# Patient Record
Sex: FEMALE | Race: WHITE | NOT HISPANIC OR LATINO | Employment: UNEMPLOYED | URBAN - METROPOLITAN AREA
[De-identification: names, ages, dates, MRNs, and addresses within clinical notes are randomized per-mention and may not be internally consistent; named-entity substitution may affect disease eponyms.]

---

## 2017-05-01 PROBLEM — Z95.5 S/P CORONARY ARTERY STENT PLACEMENT: Status: ACTIVE | Noted: 2017-05-01

## 2017-05-22 PROBLEM — B00.1 FEVER BLISTER: Status: ACTIVE | Noted: 2017-05-22

## 2018-05-01 PROBLEM — R00.1 BRADYCARDIA: Status: ACTIVE | Noted: 2018-05-01

## 2018-05-01 PROBLEM — Z71.85 VACCINE COUNSELING: Status: ACTIVE | Noted: 2018-05-01

## 2018-05-01 PROBLEM — Z28.21 PNEUMOCOCCAL VACCINE REFUSED: Status: ACTIVE | Noted: 2018-05-01

## 2018-10-05 PROBLEM — Z28.21 INFLUENZA VACCINE REFUSED: Status: ACTIVE | Noted: 2018-10-05

## 2018-10-22 ENCOUNTER — TELEPHONE (OUTPATIENT)
Dept: ADMINISTRATIVE | Facility: HOSPITAL | Age: 53
End: 2018-10-22

## 2018-11-16 ENCOUNTER — LAB VISIT (OUTPATIENT)
Dept: LAB | Facility: HOSPITAL | Age: 53
End: 2018-11-16

## 2018-11-16 DIAGNOSIS — Z12.11 COLON CANCER SCREENING: ICD-10-CM

## 2018-11-16 LAB — HEMOCCULT STL QL IA: NEGATIVE

## 2018-11-16 PROCEDURE — 82274 ASSAY TEST FOR BLOOD FECAL: CPT

## 2018-11-30 ENCOUNTER — TELEPHONE (OUTPATIENT)
Dept: ADMINISTRATIVE | Facility: HOSPITAL | Age: 53
End: 2018-11-30

## 2018-12-28 ENCOUNTER — TELEPHONE (OUTPATIENT)
Dept: ADMINISTRATIVE | Facility: HOSPITAL | Age: 53
End: 2018-12-28

## 2019-06-07 PROBLEM — I10 HYPERTENSION: Status: ACTIVE | Noted: 2019-06-07

## 2019-08-05 PROBLEM — J45.30 MILD PERSISTENT ASTHMA WITHOUT COMPLICATION: Status: ACTIVE | Noted: 2019-08-05

## 2019-08-05 PROBLEM — R53.83 FATIGUE: Status: ACTIVE | Noted: 2019-08-05

## 2019-12-03 ENCOUNTER — TELEPHONE (OUTPATIENT)
Dept: ADMINISTRATIVE | Facility: HOSPITAL | Age: 54
End: 2019-12-03

## 2019-12-03 DIAGNOSIS — Z12.11 SCREENING FOR COLON CANCER: Primary | ICD-10-CM

## 2019-12-17 ENCOUNTER — TELEPHONE (OUTPATIENT)
Dept: ADMINISTRATIVE | Facility: HOSPITAL | Age: 54
End: 2019-12-17

## 2019-12-30 ENCOUNTER — LAB VISIT (OUTPATIENT)
Dept: LAB | Facility: HOSPITAL | Age: 54
End: 2019-12-30
Attending: NURSE PRACTITIONER
Payer: MEDICAID

## 2019-12-30 DIAGNOSIS — Z12.11 SCREENING FOR COLON CANCER: ICD-10-CM

## 2019-12-30 PROCEDURE — 82274 ASSAY TEST FOR BLOOD FECAL: CPT

## 2020-01-07 LAB — HEMOCCULT STL QL IA: NEGATIVE

## 2020-03-30 PROBLEM — D64.9 ANEMIA: Status: ACTIVE | Noted: 2020-03-30

## 2020-03-30 PROBLEM — I25.118 CORONARY ARTERY DISEASE OF NATIVE ARTERY OF NATIVE HEART WITH STABLE ANGINA PECTORIS: Status: ACTIVE | Noted: 2020-03-30

## 2020-08-12 ENCOUNTER — OFFICE VISIT (OUTPATIENT)
Dept: FAMILY MEDICINE | Facility: CLINIC | Age: 55
End: 2020-08-12
Payer: MEDICAID

## 2020-08-12 VITALS
OXYGEN SATURATION: 97 % | HEART RATE: 74 BPM | BODY MASS INDEX: 39.62 KG/M2 | HEIGHT: 63 IN | SYSTOLIC BLOOD PRESSURE: 138 MMHG | DIASTOLIC BLOOD PRESSURE: 80 MMHG | TEMPERATURE: 99 F | WEIGHT: 223.63 LBS

## 2020-08-12 DIAGNOSIS — B00.1 FEVER BLISTER: ICD-10-CM

## 2020-08-12 DIAGNOSIS — J45.20 INTERMITTENT ASTHMA WITHOUT COMPLICATION, UNSPECIFIED ASTHMA SEVERITY: ICD-10-CM

## 2020-08-12 DIAGNOSIS — S93.504S: ICD-10-CM

## 2020-08-12 DIAGNOSIS — M65.311 TRIGGER FINGER OF RIGHT THUMB: Primary | ICD-10-CM

## 2020-08-12 PROCEDURE — G0463 HOSPITAL OUTPT CLINIC VISIT: HCPCS

## 2020-08-12 PROCEDURE — 99203 PR OFFICE/OUTPT VISIT, NEW, LEVL III, 30-44 MIN: ICD-10-PCS | Mod: S$PBB,,, | Performed by: NURSE PRACTITIONER

## 2020-08-12 PROCEDURE — 99205 OFFICE O/P NEW HI 60 MIN: CPT | Performed by: NURSE PRACTITIONER

## 2020-08-12 PROCEDURE — 99203 OFFICE O/P NEW LOW 30 MIN: CPT | Mod: S$PBB,,, | Performed by: NURSE PRACTITIONER

## 2020-08-12 RX ORDER — HYDROCODONE BITARTRATE AND ACETAMINOPHEN 7.5; 325 MG/1; MG/1
1 TABLET ORAL
COMMUNITY
Start: 2020-08-10 | End: 2020-08-17

## 2020-08-12 RX ORDER — VALACYCLOVIR HYDROCHLORIDE 500 MG/1
500 TABLET, FILM COATED ORAL 2 TIMES DAILY
Qty: 14 TABLET | Refills: 1 | Status: SHIPPED | OUTPATIENT
Start: 2020-08-12 | End: 2021-10-04 | Stop reason: SDUPTHER

## 2020-08-12 RX ORDER — IBUPROFEN 600 MG/1
600 TABLET ORAL
COMMUNITY
Start: 2020-08-10 | End: 2020-08-20

## 2020-08-12 RX ORDER — LEVALBUTEROL TARTRATE 45 UG/1
1-2 AEROSOL, METERED ORAL EVERY 4 HOURS PRN
Qty: 1 INHALER | Refills: 5 | Status: SHIPPED | OUTPATIENT
Start: 2020-08-12 | End: 2021-09-14 | Stop reason: SDUPTHER

## 2020-08-12 NOTE — PATIENT INSTRUCTIONS
Toe Sprain  A sprain is a stretching or tearing of the ligaments that hold a joint together. There are no broken bones. Sprains generally take from 3-6 weeks to heal. A toe sprain may be treated by taping the injured toe to the next toe. This is called buddy taping. This protects the injured toe and holds it in position. Mild sprains may not need any additional support. If the toenail has been hurt badly, it may fall off in 1-2 weeks. A new one will usually start to grow back within a month.     Home care  · Keep your leg elevated when sitting or lying down. This is very important during the first 48 hours to reduce swelling. Stay off the injured foot as much as possible until you can walk on it without pain. If needed, you may use crutches during the first week for this purpose. Crutches can be rented at many pharmacies or surgical/orthopedic supply stores.  · You may be given a cast shoe to wear to prevent movement in your toe. If not, you can use a sandal or any shoe that does not put pressure on the injured toe until the swelling and pain go away. If using a sandal, be careful not to hit your foot against anything, since another injury could make the sprain worse.  · Apply an ice pack over the injured area for 15 to 20 minutes every 3 to 6 hours. You should do this for the first 24 to 48 hours. You can make an ice pack by filling a plastic bag that seals at the top with ice cubes and then wrapping it with a thin towel. Continue to use ice packs for relief of pain and swelling as needed. As the ice melts, be careful to avoid getting your wrap, splint, or cast wet. As the ice melts, be careful to avoid getting any wrap, splint, tape, or cast wet. After 48 hours, apply heat from a warm shower or bath for 20 minutes several times daily. Alternating ice and heat may also be helpful.  · If buddy tape was applied and it becomes wet or dirty, change it. You may replace it with paper, plastic or cloth tape. Cloth tape  and paper tapes must be kept dry. Apply gauze or cotton padding between the toes, especially near webbed area. This will help prevent the skin from getting moist and breaking down. Keep the whitney tape in place for at least 4 weeks, or as advised by your healthcare provider.  · You may use over-the-counter pain medicine to control pain, unless another pain medicine was prescribed. If you have chronic liver or kidney disease or ever had a stomach ulcer or GI bleeding, talk with your healthcare provider before using these medicines.  · You may return to sports after healing, when you can run without pain.  Follow-up care  Follow up with your with your healthcare provider as advised. Sometimes fractures dont show up on the first X-ray. Bruises and sprains can sometimes hurt as much as a fracture. These injuries can take time to heal completely. If your symptoms dont improve or they get worse, talk with your healthcare provider. You may need a repeat X-ray. If X-rays were taken, you will be told of any new findings that may affect your care.  When to seek medical advice  Call your healthcare provider right away if any of these occur:  · Redness, warmth, or fluid drainage from your toe  · Pain or swelling increases  · Toes become cold, blue, numb, or tingly  Date Last Reviewed: 11/20/2015  © 9814-7009 [employer]. [address] [place] [address]. All rights reserved. This information is not intended as a substitute for professional medical care. Always follow your healthcare professional's instructions.        Treating Trigger Finger     The tendon sheath is opened to release the tendon. Once the tendon can move freely again, the finger can bend and straighten more normally.     Trigger finger occurs when the tissue inside your finger or thumb becomes inflamed. Mild cases can be treated without surgery. If the problem is severe, surgery may be needed. Your doctor will discuss your options with  you.  Nonsurgical treatment  For mild symptoms, your doctor may have you rest the finger or thumb. You may also be told to take anti-inflammatory medicines. These include ibuprofen or aspirin. You may be given an injection of medicine in the base of the finger or thumb. This typically is a steroid, such as cortisone.  Surgery  If nonsurgical treatments dont ease your symptoms, surgery may be recommended. A tendon is a cordlike fiber that attaches muscle to bone and allows joints to bend. The tendon is surrounded by a protective cover called a sheath. During surgery, the sheath in your finger or thumb is opened to enlarge the space and release the swollen tendon. This allows the finger or thumb to bend and straighten normally. Surgery takes about 20 minutes. It can often be done using a local anesthetic. You may be able to go home the same day. Your hand will be wrapped in a soft bandage. You may need to wear a plaster splint for a short time to keep the finger or thumb still as it heals. The stitches will be removed in about 2 weeks. Your doctor can discuss the risks and benefits of surgery with you.  Date Last Reviewed: 9/21/2015  © 0116-4881 [employer]. [address] [place] [address], [place] [address]. All rights reserved. This information is not intended as a substitute for professional medical care. Always follow your healthcare professional's instructions.

## 2020-08-12 NOTE — PROGRESS NOTES
SUBJECTIVE:      Patient ID: Zoila Moreland is a 54 y.o. female.    Chief Complaint: Toe Pain (2nd  toe on Rt foot. Injured toe 7/3/20 she was moving a table and the leg of the table slid across her toe. She experienced severe bruising and pain but it was healed. On Monday she was walking down the stairs at her Daughter's house and experienced severe pain in the same toe walking down the stairs. Pt was seen at [facility] in [place]. ) and Hand Pain (Trigger finger Rt hand.)    Zoila is here today with c/o toe and hand pain.  She is a new patient to our clinic. She is currently staying in [place] with her daughter and has acute complaints.   Her normal PCP is in [place]. She also needs refills for her Valtrex for cold sores and her rescue inhaler for asthma.    Toe Pain   The incident occurred more than 1 week ago. The incident occurred at home. The injury mechanism was a direct blow. The pain is present in the right toes. The pain is severe. The pain has been constant since onset. Pertinent negatives include no inability to bear weight, loss of motion, loss of sensation, muscle weakness, numbness or tingling. She reports no foreign bodies present. The symptoms are aggravated by movement, palpation and weight bearing. She has tried nothing for the symptoms.   Hand Pain   There was no injury mechanism. The pain is present in the right hand. The quality of the pain is described as aching and shooting. The pain does not radiate. Pertinent negatives include no chest pain, muscle weakness, numbness or tingling.       Past Surgical History:   Procedure Laterality Date    BILATERAL SALPINGOOPHORECTOMY Bilateral 5/18/2007    Torsion of ovaries and tubes    BREAST BIOPSY Right     (-)    CARDIAC CATHETERIZATION      cardiac stents  01/2017    HYSTERECTOMY  1/13/2006    uterus and cervix due to AUB    OOPHORECTOMY       Family History   Problem Relation Age of Onset    Heart disease Mother     Diabetes  Mother     Heart disease Father       Social History     Socioeconomic History    Marital status:      Spouse name: Danny    Number of children: 2    Years of education: 12th    Highest education level: Not on file   Occupational History    Occupation: homemaker   Social Needs    Financial resource strain: Not on file    Food insecurity     Worry: Not on file     Inability: Not on file    Transportation needs     Medical: Not on file     Non-medical: Not on file   Tobacco Use    Smoking status: Never Smoker    Smokeless tobacco: Never Used   Substance and Sexual Activity    Alcohol use: No    Drug use: No    Sexual activity: Yes     Partners: Male     Birth control/protection: Surgical   Lifestyle    Physical activity     Days per week: Not on file     Minutes per session: Not on file    Stress: Not on file   Relationships    Social connections     Talks on phone: Not on file     Gets together: Not on file     Attends Hindu service: Not on file     Active member of club or organization: Not on file     Attends meetings of clubs or organizations: Not on file     Relationship status: Not on file   Other Topics Concern    Are you pregnant or think you may be? No    Breast-feeding No   Social History Narrative    Not on file     Current Outpatient Medications   Medication Sig Dispense Refill    ascorbic acid (VITAMIN C) 1000 MG tablet Take 1,000 mg by mouth once daily.      aspirin 81 MG Chew Take 1 tablet (81 mg total) by mouth once daily. 1 tablet 0    atenoloL (TENORMIN) 50 MG tablet Take 1 tablet (50 mg total) by mouth once daily. 90 tablet 3    atorvastatin (LIPITOR) 80 MG tablet Take 1 tablet (80 mg total) by mouth every evening. 90 tablet 3    calcium-vitamin D (OSCAL) 250 (625)-125 mg-unit per tablet Take 1 tablet by mouth once daily.      ibuprofen (ADVIL,MOTRIN) 600 MG tablet Take 600 mg by mouth.      levalbuterol (XOPENEX HFA) 45 mcg/actuation inhaler Inhale 1-2 puffs  into the lungs every 4 (four) hours as needed for Wheezing. Rescue 1 Inhaler 5    lisinopriL (PRINIVIL,ZESTRIL) 40 MG tablet Take 1 tablet (40 mg total) by mouth every evening. 90 tablet 3    pantoprazole (PROTONIX) 40 MG tablet Take 1 tablet (40 mg total) by mouth once daily. 90 tablet 3    solifenacin (VESICARE) 10 MG tablet Take 1 tablet by mouth once daily 90 tablet 0    valACYclovir (VALTREX) 500 MG tablet Take 1 tablet (500 mg total) by mouth 2 (two) times daily. As needed for flare-up 14 tablet 1    HYDROcodone-acetaminophen (NORCO) 7.5-325 mg per tablet Take 1 tablet by mouth.      LORazepam (ATIVAN) 0.5 MG tablet Take one tablet approximately 30 min prior to MRI. Do not drive when taking lorazepam. (Patient not taking: Reported on 8/12/2020) 1 tablet 0     No current facility-administered medications for this visit.      Review of patient's allergies indicates:  No Known Allergies   Past Medical History:   Diagnosis Date    Allergy     Anemia 3/30/2020    Asthma     Fever blister     Hypertension      Past Surgical History:   Procedure Laterality Date    BILATERAL SALPINGOOPHORECTOMY Bilateral 5/18/2007    Torsion of ovaries and tubes    BREAST BIOPSY Right     (-)    CARDIAC CATHETERIZATION      cardiac stents  01/2017    HYSTERECTOMY  1/13/2006    uterus and cervix due to AUB    OOPHORECTOMY         Review of Systems   Constitutional: Negative for activity change, appetite change, chills, diaphoresis, fatigue, fever and unexpected weight change.   HENT: Negative for congestion, nosebleeds, rhinorrhea, sore throat and voice change.    Eyes: Negative for pain, discharge and visual disturbance.   Respiratory: Negative for apnea, cough, shortness of breath and wheezing.    Cardiovascular: Negative for chest pain and palpitations.   Gastrointestinal: Negative for abdominal pain, constipation, diarrhea, nausea and vomiting.   Endocrine: Negative for polydipsia, polyphagia and polyuria.  "  Genitourinary: Negative for difficulty urinating, dysuria, frequency, menstrual problem, urgency and vaginal discharge.   Musculoskeletal: Negative for arthralgias, gait problem and myalgias.   Skin: Negative for color change, pallor and rash.   Allergic/Immunologic: Negative for immunocompromised state.   Neurological: Negative for dizziness, tingling, syncope, weakness, numbness and headaches.   Hematological: Negative for adenopathy. Does not bruise/bleed easily.   Psychiatric/Behavioral: Negative for confusion, dysphoric mood, self-injury, sleep disturbance and suicidal ideas.      OBJECTIVE:      Vitals:    08/12/20 1454 08/12/20 1539   BP: (!) 170/100 138/80   BP Location: Right arm Left arm   Patient Position: Sitting Sitting   BP Method: Large (Manual) Large (Manual)   Pulse: 74    Temp: 98.8 °F (37.1 °C)    SpO2: 97%    Weight: 101.4 kg (223 lb 9.6 oz)    Height: 5' 3" (1.6 m)      Physical Exam  Vitals signs reviewed.   Constitutional:       General: She is not in acute distress.     Appearance: Normal appearance. She is well-developed. She is obese. She is not ill-appearing, toxic-appearing or diaphoretic.   HENT:      Head: Normocephalic and atraumatic.      Right Ear: External ear normal.      Left Ear: External ear normal.      Nose: Nose normal.   Eyes:      General: Lids are normal. No scleral icterus.        Right eye: No discharge.         Left eye: No discharge.      Conjunctiva/sclera: Conjunctivae normal.   Neck:      Musculoskeletal: Normal range of motion and neck supple.      Thyroid: No thyromegaly.      Vascular: No carotid bruit.   Cardiovascular:      Rate and Rhythm: Normal rate and regular rhythm.      Pulses: Normal pulses.      Heart sounds: Normal heart sounds. No murmur. No friction rub. No gallop.    Pulmonary:      Effort: Pulmonary effort is normal. No respiratory distress.      Breath sounds: Normal breath sounds. No stridor. No wheezing, rhonchi or rales.   Abdominal:      " General: Bowel sounds are normal.      Palpations: Abdomen is soft.      Tenderness: There is no abdominal tenderness.   Musculoskeletal:      Right hand: She exhibits decreased range of motion (thumb) and tenderness. She exhibits normal capillary refill.        Hands:       Right foot: Bony tenderness (2nd toe) and swelling present.   Lymphadenopathy:      Cervical: No cervical adenopathy.   Skin:     General: Skin is warm and dry.      Coloration: Skin is not jaundiced.   Neurological:      General: No focal deficit present.      Mental Status: She is alert and oriented to person, place, and time.   Psychiatric:         Mood and Affect: Mood normal.         Behavior: Behavior normal.         Thought Content: Thought content normal. Thought content does not include suicidal plan.         Judgment: Judgment normal.        Assessment:       1. Trigger finger of right thumb    2. Sprain of toe, second, right, sequela    3. Fever blister    4. Intermittent asthma without complication, unspecified asthma severity        Plan:       Trigger finger of right thumb  -     Ambulatory referral/consult to Orthopedics; Future; Expected date: 08/19/2020    Sprain of toe, second, right, sequela  -     Ambulatory referral/consult to Podiatry; Future; Expected date: 08/19/2020    Fever blister  -     valACYclovir (VALTREX) 500 MG tablet; Take 1 tablet (500 mg total) by mouth 2 (two) times daily. As needed for flare-up  Dispense: 14 tablet; Refill: 1    Intermittent asthma without complication, unspecified asthma severity  -     levalbuterol (XOPENEX HFA) 45 mcg/actuation inhaler; Inhale 1-2 puffs into the lungs every 4 (four) hours as needed for Wheezing. Rescue  Dispense: 1 Inhaler; Refill: 5        Follow up if symptoms worsen or fail to improve.      8/12/2020 HUBERT Marcus, FNP

## 2020-08-17 ENCOUNTER — TELEPHONE (OUTPATIENT)
Dept: FAMILY MEDICINE | Facility: CLINIC | Age: 55
End: 2020-08-17

## 2020-08-17 NOTE — TELEPHONE ENCOUNTER
----- Message from Zuly Hidalgo sent at 8/13/2020  1:31 PM CDT -----  DAMON PARDO [facility], 8/12/20

## 2020-08-18 ENCOUNTER — HOSPITAL ENCOUNTER (OUTPATIENT)
Dept: RADIOLOGY | Facility: CLINIC | Age: 55
Discharge: HOME | End: 2020-08-18
Attending: PODIATRIST
Payer: MEDICAID

## 2020-08-18 ENCOUNTER — OFFICE VISIT (OUTPATIENT)
Dept: PODIATRY | Facility: CLINIC | Age: 55
End: 2020-08-18
Payer: MEDICAID

## 2020-08-18 VITALS
HEIGHT: 63 IN | BODY MASS INDEX: 39.51 KG/M2 | HEART RATE: 80 BPM | RESPIRATION RATE: 16 BRPM | DIASTOLIC BLOOD PRESSURE: 76 MMHG | SYSTOLIC BLOOD PRESSURE: 130 MMHG | WEIGHT: 223 LBS | TEMPERATURE: 98 F

## 2020-08-18 DIAGNOSIS — M77.50 TENDONITIS OF FOOT: ICD-10-CM

## 2020-08-18 DIAGNOSIS — M79.672 BILATERAL FOOT PAIN: ICD-10-CM

## 2020-08-18 DIAGNOSIS — M79.671 BILATERAL FOOT PAIN: ICD-10-CM

## 2020-08-18 DIAGNOSIS — M72.2 PLANTAR FASCIITIS: ICD-10-CM

## 2020-08-18 DIAGNOSIS — S93.504S: ICD-10-CM

## 2020-08-18 DIAGNOSIS — S92.524D: Primary | ICD-10-CM

## 2020-08-18 PROCEDURE — 73630 XR FOOT COMPLETE 3 VIEW BILATERAL: ICD-10-PCS | Mod: 26,50,S$PBB, | Performed by: PODIATRIST

## 2020-08-18 PROCEDURE — 73630 X-RAY EXAM OF FOOT: CPT | Mod: 50,PBBFAC | Performed by: PODIATRIST

## 2020-08-18 PROCEDURE — 99215 OFFICE O/P EST HI 40 MIN: CPT | Mod: 25 | Performed by: PODIATRIST

## 2020-08-18 PROCEDURE — 99203 OFFICE O/P NEW LOW 30 MIN: CPT | Mod: S$PBB,,, | Performed by: PODIATRIST

## 2020-08-18 PROCEDURE — 99203 PR OFFICE/OUTPT VISIT, NEW, LEVL III, 30-44 MIN: ICD-10-PCS | Mod: S$PBB,,, | Performed by: PODIATRIST

## 2020-08-18 NOTE — LETTER
August 19, 2020      Madiha Corral, FNP  [address]  [place] [address]           [facility] Pod[facility]  [address]  [place] [address]  Phone: [phone]  Fax: [phone]          Patient: Zoila Moreland   MR Number: [id number]   YOB: 1965   Date of Visit: 8/18/2020       Dear Madiha Corral:    Thank you for referring Zoila Moreland to me for evaluation. Attached you will find relevant portions of my assessment and plan of care.    If you have questions, please do not hesitate to call me. I look forward to following Zoila Moreland along with you.    Sincerely,    Kwasi Ball, BEREKET    Enclosure  CC:  No Recipients    If you would like to receive this communication electronically, please contact [email or web address] or [phone] to request more information on [employer] access.    For providers and/or their staff who would like to refer a patient to [facility], please contact us through our one-stop-shop provider referral line, [facility], at [phone].    If you feel you have received this communication in error or would no longer like to receive these types of communications, please e-mail [email or web address]

## 2020-08-18 NOTE — PROGRESS NOTES
[address] [facility] [address] [place] [address]  Phone: [phone]   [phone]    Patient's PCP:YESENIA Marcus  Referring Provider: Madiha Corral    Subjective:      Chief Complaint:: Foot Pain (right seconf toe and left plantar surface)    AMEE Moreland is a 54 y.o. female who presents with a complaint of  Second toe right foot sprain lasting for over a month. Onset of the symptoms was moving coffee table and the leg knocked across top of toe resulting in bruising. Last week patient had sharp pain when walking down daughters steps.  Current symptoms include sharp pain swelling,  And blue bruising.  Aggravating factors are walking, flip-flops. Symptoms have remained.Treatment to date have included er visit, x-rays, boot wore for one day Patients rates pain 4/10 on pain scale. Patient also has complain of left foot pain plantar surface with sharp to throbbing pain for several months.     Systemic Doctor: YESENIA Marcus  Date Last Seen: 08/12/2020    Vitals:    08/18/20 1538   BP: 130/76   Pulse: 80   Resp: 16   Temp: 98.4 °F (36.9 °C)     Shoe Size: 6-6.5    Past Surgical History:   Procedure Laterality Date    BILATERAL SALPINGOOPHORECTOMY Bilateral 5/18/2007    Torsion of ovaries and tubes    BREAST BIOPSY Right     (-)    CARDIAC CATHETERIZATION      cardiac stents  01/2017    HYSTERECTOMY  1/13/2006    uterus and cervix due to AUB    OOPHORECTOMY       Past Medical History:   Diagnosis Date    Allergy     Anemia 3/30/2020    Asthma     Fever blister     Hypertension      Family History   Problem Relation Age of Onset    Heart disease Mother     Diabetes Mother     Heart disease Father         Social History:   Marital Status:   Alcohol History:  reports no history of alcohol use.  Tobacco History:  reports that she has never smoked. She has never used smokeless tobacco.  Drug History:  reports no history of drug use.    Review of patient's allergies  indicates:  No Known Allergies    Current Outpatient Medications   Medication Sig Dispense Refill    ascorbic acid (VITAMIN C) 1000 MG tablet Take 1,000 mg by mouth once daily.      aspirin 81 MG Chew Take 1 tablet (81 mg total) by mouth once daily. 1 tablet 0    atenoloL (TENORMIN) 50 MG tablet Take 1 tablet (50 mg total) by mouth once daily. 90 tablet 3    atorvastatin (LIPITOR) 80 MG tablet Take 1 tablet (80 mg total) by mouth every evening. 90 tablet 3    calcium-vitamin D (OSCAL) 250 (625)-125 mg-unit per tablet Take 1 tablet by mouth once daily.      ibuprofen (ADVIL,MOTRIN) 600 MG tablet Take 600 mg by mouth.      levalbuterol (XOPENEX HFA) 45 mcg/actuation inhaler Inhale 1-2 puffs into the lungs every 4 (four) hours as needed for Wheezing. Rescue 1 Inhaler 5    lisinopriL (PRINIVIL,ZESTRIL) 40 MG tablet Take 1 tablet (40 mg total) by mouth every evening. 90 tablet 3    LORazepam (ATIVAN) 0.5 MG tablet Take one tablet approximately 30 min prior to MRI. Do not drive when taking lorazepam. (Patient not taking: Reported on 8/12/2020) 1 tablet 0    pantoprazole (PROTONIX) 40 MG tablet Take 1 tablet (40 mg total) by mouth once daily. 90 tablet 3    solifenacin (VESICARE) 10 MG tablet Take 1 tablet by mouth once daily 90 tablet 0    valACYclovir (VALTREX) 500 MG tablet Take 1 tablet (500 mg total) by mouth 2 (two) times daily. As needed for flare-up 14 tablet 1     No current facility-administered medications for this visit.        Review of Systems      Objective:        Physical Exam:   Foot Exam    General  General Appearance: appears stated age and healthy   Orientation: alert and oriented to person, place, and time   Affect: appropriate   Gait: antalgic       Right Foot/Ankle     Inspection and Palpation  Ecchymosis: none  Tenderness: (Sec toe)  Swelling: (Sec toe)  Arch: normal  Skin Exam: skin intact;     Neurovascular  Dorsalis pedis: 2+  Posterior tibial: 2+  Saphenous nerve sensation:  normal  Tibial nerve sensation: normal  Superficial peroneal nerve sensation: normal  Deep peroneal nerve sensation: normal  Sural nerve sensation: normal    Muscle Strength  Ankle dorsiflexion: 5  Ankle plantar flexion: 5  Ankle inversion: 5  Ankle eversion: 5  Great toe extension: 5  Great toe flexion: 5    Comments  Tenderness with dorsiflexion of the 2nd toe against resistance.  Muscle strength is normal.  Extensor digitorum tendon is well palpable.    Left Foot/Ankle      Inspection and Palpation  Ecchymosis: none  Tenderness: plantar fascia   Swelling: none   Arch: normal  Skin Exam: skin intact;     Neurovascular  Dorsalis pedis: 2+  Posterior tibial: 2+  Saphenous nerve sensation: normal  Tibial nerve sensation: normal  Superficial peroneal nerve sensation: normal  Deep peroneal nerve sensation: normal  Sural nerve sensation: normal    Muscle Strength  Ankle dorsiflexion: 5  Ankle plantar flexion: 5  Ankle inversion: 5  Ankle eversion: 5  Great toe extension: 5  Great toe flexion: 5    Range of Motion    Passive  Ankle dorsiflexion: 5      Tests  Anterior drawer: negative   Talar tilt: negative     Comments  Pain on palpation of the infeior medial heel. No pain present  with side to side compression of the calcaneus. Negative tinnel's sign  at the tarsal tunnel. Negative Powers's nerve pain. Negative Calcaneal nerve pain. No soft tissue masses. Pain absent  with dorsiflexion of the ankle. No edema, erythema, or ecchymosis noted.       Physical Exam   Cardiovascular:   Pulses:       Dorsalis pedis pulses are 2+ on the right side and 2+ on the left side.        Posterior tibial pulses are 2+ on the right side and 2+ on the left side.       Imaging: X-ray 3 views of the left foot were taken AP, lateral, and oblique, weight bearing showing:  No fractures or dislocations.  No bone tumors or soft tissue masses.  Degenerative changes are noted at the 2nd metatarsophalangeal joint.  Plantar and posterior calcaneal  osteophytes are present.      Electronically Signed by: Kwasi Ball DPM    X-ray 3 views of the right foot were taken AP, lateral, and oblique, weight bearing showing:  There is a small nondisplaced chip fracture at the head of the middle phalanx of the 2nd toe.  This appears to be healing.  No other fractures or dislocations noted.  Plantar and posterior calcaneal osteophytes are present.  No bone tumors or soft tissue masses.      Electronically Signed by: Kwasi Ball DPM               Assessment:       1. Closed nondisplaced fracture of middle phalanx of lesser toe of right foot with routine healing    2. Tendonitis of foot - Right Foot    3. Sprain of toe, second, right, sequela    4. Plantar fasciitis - Left Foot    5. Bilateral foot pain      Plan:   Closed nondisplaced fracture of middle phalanx of lesser toe of right foot with routine healing    Tendonitis of foot - Right Foot    Sprain of toe, second, right, sequela  -     Ambulatory referral/consult to Podiatry    Plantar fasciitis - Left Foot    Bilateral foot pain  -     X-Ray Foot Complete Bilateral      Follow up if symptoms worsen or fail to improve.    Procedures - None    Plantar Fasciitis Level I Treatment:   Anti-inflammatory  No bare feet  Over the counter insert  Restrict activity level   Use running shoes at full time  No impact exercise and stretch gastrocnemius muscle      Counseling:     I provided patient education verbally regarding:   Patient diagnosis, treatment options, as well as alternatives, risks, and benefits.     Treatment of tendonitis with rest, ice, oral NSAID, topical antiinflammatory creams, cam walker boot if needed, and MRI if needed.    Discussed different treatment options for heel pain. I gave written and verbal instructions on heel cord stretching and this was demonstrated for the patient. Patient expressed understanding. Discussed wearing appropriate shoe gear and avoiding flats, slippers, sandals, barefoot, and  sockfeet. Recommended arch supports. My recommendation for OTC supports is Spenco polysorb replacement insoles or patient may elect more aggressive treatment with prescription arch supports. We also discussed cortisone injections and NSAID therapy.     I discussed with the pt. the type of fracture and the treatment and time required for healing of the the specific type fracture.      This note was created using Dragon voice recognition software that occasionally misinterpreted phrases or words.

## 2020-08-18 NOTE — PATIENT INSTRUCTIONS
Plantar Fasciitis  Plantar fasciitis is a painful swelling of the plantar fascia. The plantar fascia is a thick, fibrous layer of tissue. It covers the bones on the bottom of your foot. And it supports the foot bones in an arched position.  This can happen gradually or suddenly. It usually affects one foot at a time. Heel pain can be sharp, like a knife sticking into the bottom of your foot. You may feel pain after exercising, long-distance jogging, stair climbing, long periods of standing, or after standing up.  Risk factors include: non-active lifestyle, arthritis, diabetes, obesity or recent weight gain, flat foot, high arch. Wearing high heels, loose shoes, or shoes with poor arch support for long periods of time adds to the risk. This problem is commonly found in runners and dancers. It also found in people who stand on hard surfaces for long periods of time.  Foot pain from this condition is usually worse in the morning. But it improves with walking. By the end of the day there may be a dull aching. Treatment requires short-term rest and controlling swelling. It may take up to 9 months before all symptoms go away. Rarely, a steroid injection into the foot, or surgery, may be needed.  Home care  · If you are overweight, lose weight to help healing.  · Choose supportive shoes with good arch support and shock absorbency. Replace athletic shoes when they become worn out. Dont walk or run barefoot.  · Premade or custom-fitted shoe inserts may be helpful. Inserts made of silicone seem to be the most effective. Custom-made inserts can be provided by a podiatrist or foot specialist, physical therapist, or orthopedist.  · Premade or custom-made night splints keep the heel stretched out while you sleep. They may prevent morning pain.  · Avoid activities that stress the feet: jogging, prolonged standing or walking, contact sports, etc.  · First thing in the morning and before sports, stretch the bottom of your feet.  Gently flex your ankle so the toes move toward your knee.  · Icing may help control heel pain. Apply an ice pack to the heel for 10-20 minutes as a preventive. Or ice your heel after a severe flare-up of symptoms. You may repeat this every 1-2 hours as needed.  · You may use over-the-counter pain medicine to control pain, unless another medicine was prescribed. Anti-inflammatory pain medicines, such as ibuprofen or naproxen, may work better than acetaminophen. If you have chronic liver or kidney disease or ever had a stomach ulcer or GI bleeding, talk with your healthcare provider before using these medicines.  Follow-up care  Follow up with your healthcare provider, physical therapist, or podiatrist or foot specialist as advised.  Call for an appointment if pain worsens or there is no relief after a few weeks of home treatment. Shoe inserts, a night splint, or a special boot may be required.  If X-rays were taken, you will be told of any new findings that may affect your care.  When to seek medical advice  Call your healthcare provider right away if any of these occur:  · Foot swelling  · Redness with increasing pain  Date Last Reviewed: 11/21/2015  © 3660-2407 [employer]. [address] [place] [address]. All rights reserved. This information is not intended as a substitute for professional medical care. Always follow your healthcare professional's instructions.          Tendonitis  A tendon is the thick fibrous cord that joins muscle to bone and allows joints to move. When a tendon becomes inflamed, it is called tendonitis. This can occur from overuse, injury, or infection. This usually involves the shoulders, forearm, wrist, hands and foot. Symptoms include pain, swelling and tenderness to the touch. Moving the joint increases the pain.  It takes 4 to 6 weeks for tendonitis to heal. It is treated by preventing motion of the tendon with a splint or brace and the use of anti-inflammatory  medicine.  Home care  · Some people find relief with ice packs. These can be crushed or cubed ice in a plastic bag or a bag of frozen vegetables wrapped in a thin towel. Other people get better relief with heat. This can include a hot shower, hot bath, or a moist towel warmed in a microwave. Try each and use the method that feels best, for 15 to 20 minutes several times a day.  · Rest the inflamed joint and protect it from movement.  · You may use over-the-counter ibuprofen or naproxen to treat pain and inflammation, unless another medicine was prescribed. If you can't take these medicines, acetaminophen may help with the pain, but does not treat inflammation. If you have chronic liver or kidney disease or ever had a stomach ulcer or gastrointestinal bleeding, talk with your doctor before using these medicines.  · As your symptoms improve, begin gradual motion at the involved joint.  Follow-up care  Follow up with your healthcare provider if you are not improving after 5 days of treatment.  When to seek medical advice  Call your healthcare provider right away if any of these occur:  · Redness over the painful area  · Increasing pain or swelling at the joint  · Fever (1 degree above your normal temperature) lasting 24 to 48 hours Or, whatever your healthcare provider told you to report based on your condition  Date Last Reviewed: 11/21/2015  © 1920-9505 [employer]. [address] [place] [address], [place] [address]. All rights reserved. This information is not intended as a substitute for professional medical care. Always follow your healthcare professional's instructions.

## 2020-08-19 ENCOUNTER — TELEPHONE (OUTPATIENT)
Dept: ORTHOPEDICS | Facility: CLINIC | Age: 55
End: 2020-08-19

## 2020-08-19 ENCOUNTER — OFFICE VISIT (OUTPATIENT)
Dept: ORTHOPEDICS | Facility: CLINIC | Age: 55
End: 2020-08-19
Payer: MEDICAID

## 2020-08-19 VITALS
HEART RATE: 55 BPM | DIASTOLIC BLOOD PRESSURE: 86 MMHG | SYSTOLIC BLOOD PRESSURE: 138 MMHG | WEIGHT: 225 LBS | BODY MASS INDEX: 39.87 KG/M2 | HEIGHT: 63 IN

## 2020-08-19 DIAGNOSIS — M79.644 THUMB PAIN, RIGHT: ICD-10-CM

## 2020-08-19 DIAGNOSIS — M65.311 TRIGGER FINGER OF RIGHT THUMB: Primary | ICD-10-CM

## 2020-08-19 PROCEDURE — 99213 PR OFFICE/OUTPT VISIT, EST, LEVL III, 20-29 MIN: ICD-10-PCS | Mod: S$GLB,,, | Performed by: ORTHOPAEDIC SURGERY

## 2020-08-19 PROCEDURE — 99213 OFFICE O/P EST LOW 20 MIN: CPT | Mod: S$GLB,,, | Performed by: ORTHOPAEDIC SURGERY

## 2020-08-19 RX ORDER — MUPIROCIN 20 MG/G
OINTMENT TOPICAL
Status: CANCELLED | OUTPATIENT
Start: 2020-08-19

## 2020-08-19 NOTE — PROGRESS NOTES
[facility]    Subjective:     Chief Complaint:   Chief Complaint   Patient presents with    Right Hand - Pain     Right hand thumb pain x a while. States that her thumb locks up on her and hard to bend the thumb. Did have a TFR on that finger in the past       Past Medical History:   Diagnosis Date    Allergy     Anemia 3/30/2020    Asthma     Fever blister     Hypertension        Past Surgical History:   Procedure Laterality Date    BILATERAL SALPINGOOPHORECTOMY Bilateral 5/18/2007    Torsion of ovaries and tubes    BREAST BIOPSY Right     (-)    CARDIAC CATHETERIZATION      cardiac stents  01/2017    HYSTERECTOMY  1/13/2006    uterus and cervix due to AUB    OOPHORECTOMY         Current Outpatient Medications   Medication Sig    ascorbic acid (VITAMIN C) 1000 MG tablet Take 1,000 mg by mouth once daily.    atenoloL (TENORMIN) 50 MG tablet Take 1 tablet (50 mg total) by mouth once daily.    atorvastatin (LIPITOR) 80 MG tablet Take 1 tablet (80 mg total) by mouth every evening.    calcium-vitamin D (OSCAL) 250 (625)-125 mg-unit per tablet Take 1 tablet by mouth once daily.    ibuprofen (ADVIL,MOTRIN) 600 MG tablet Take 600 mg by mouth.    levalbuterol (XOPENEX HFA) 45 mcg/actuation inhaler Inhale 1-2 puffs into the lungs every 4 (four) hours as needed for Wheezing. Rescue    lisinopriL (PRINIVIL,ZESTRIL) 40 MG tablet Take 1 tablet (40 mg total) by mouth every evening.    pantoprazole (PROTONIX) 40 MG tablet Take 1 tablet (40 mg total) by mouth once daily.    solifenacin (VESICARE) 10 MG tablet Take 1 tablet by mouth once daily    valACYclovir (VALTREX) 500 MG tablet Take 1 tablet (500 mg total) by mouth 2 (two) times daily. As needed for flare-up    aspirin 81 MG Chew Take 1 tablet (81 mg total) by mouth once daily.    LORazepam (ATIVAN) 0.5 MG tablet Take one tablet approximately 30 min prior to MRI. Do not drive when taking lorazepam. (Patient not taking: Reported on 8/12/2020)      No current facility-administered medications for this visit.        Review of patient's allergies indicates:  No Known Allergies    Family History   Problem Relation Age of Onset    Heart disease Mother     Diabetes Mother     Heart disease Father        Social History     Socioeconomic History    Marital status:      Spouse name: Danny    Number of children: 2    Years of education: 12th    Highest education level: Not on file   Occupational History    Occupation: homemaker   Social Needs    Financial resource strain: Not on file    Food insecurity     Worry: Not on file     Inability: Not on file    Transportation needs     Medical: Not on file     Non-medical: Not on file   Tobacco Use    Smoking status: Never Smoker    Smokeless tobacco: Never Used   Substance and Sexual Activity    Alcohol use: No    Drug use: No    Sexual activity: Yes     Partners: Male     Birth control/protection: Surgical   Lifestyle    Physical activity     Days per week: Not on file     Minutes per session: Not on file    Stress: Not on file   Relationships    Social connections     Talks on phone: Not on file     Gets together: Not on file     Attends Hinduism service: Not on file     Active member of club or organization: Not on file     Attends meetings of clubs or organizations: Not on file     Relationship status: Not on file   Other Topics Concern    Are you pregnant or think you may be? No    Breast-feeding No   Social History Narrative    Not on file       History of present illness:  Right thumb pain for of several months.  Sounds like a trigger she describes it.  No prior treatment.  Looks like she did have a trigger finger release left thumb in the past which worked fine      Review of Systems:    Constitution: Negative for chills, fever, and sweats.  Negative for unexplained weight loss.    HENT:  Negative for headaches and blurry vision.    Cardiovascular:Negative for chest pain or irregular  heart beat. Negative for hypertension.    Respiratory:  Negative for cough and shortness of breath.    Gastrointestinal: Negative for abdominal pain, heartburn, melena, nausea, and vomitting.    Genitourinary:  Negative bladder incontinence and dysuria.    Musculoskeletal:  See HPI for details.     Neurological: Negative for numbness.    Psychiatric/Behavioral: Negative for depression.  The patient is not nervous/anxious.      Endocrine: Negative for polyuria    Hematologic/Lymphatic: Negative for bleeding problem.  Does not bruise/bleed easily.    Skin: Negative for poor would healing and rash    Objective:      Physical Examination:    Vital Signs:    Vitals:    08/19/20 0917   BP: 138/86   Pulse: (!) 55       Body mass index is 39.86 kg/m².    This a well-developed, well nourished patient in no acute distress.  They are alert and oriented and cooperative to examination.        Physical exam shows tenderness in the right thumb at the flexor tendon.  Seems to trigger.  Opposite hand does not  Pertinent New Results:    XRAY Report / Interpretation:   AP lateral oblique x-rays of the right fingers shows very minimal degenerative changes at the base of the thumb.  Otherwise unremarkable.      Assessment/Plan:      Impression is trigger finger right thumb.  Quite symptomatic. On the left hand she has tried conservative measures and it did not work. She wants to proceed directly to surgery on the right hand particularly since it is pretty severe and has been there for months. We did history and physical consent for trigger finger release right thumb.      This note was created using Dragon voice recognition software that occasionally misinterpreted phrases or words.

## 2020-08-19 NOTE — LETTER
August 19, 2020      Madiha Corral, FNP  [address] [facility] [address]           [facility]  [address]  [facility] [address]  Phone: [phone]  Fax: [phone]          Patient: Zoila Moreland   MR Number: [id number]   YOB: 1965   Date of Visit: 8/19/2020       Dear Madiha Corral:    Thank you for referring Zoila Moreland to me for evaluation. Attached you will find relevant portions of my assessment and plan of care.    If you have questions, please do not hesitate to call me. I look forward to following Zoila Moreland along with you.    Sincerely,    Sam Bangura Jr., MD    Enclosure  CC:  No Recipients    If you would like to receive this communication electronically, please contact [email or web address] or [phone] to request more information on [employer] Link access.    For providers and/or their staff who would like to refer a patient to [facility], please contact us through our one-stop-shop provider referral line, [facility], at [phone].    If you feel you have received this communication in error or would no longer like to receive these types of communications, please e-mail [email or web address]

## 2020-08-20 ENCOUNTER — TELEPHONE (OUTPATIENT)
Dept: ORTHOPEDICS | Facility: CLINIC | Age: 55
End: 2020-08-20

## 2020-08-20 NOTE — TELEPHONE ENCOUNTER
----- Message from Ara Alexander sent at 8/20/2020 11:57 AM CDT -----  Contact: Pt  Pt stated she would like a call back regarding when she should d/c blood thinning mediation before her upcoming Sx.     Pt call back # [phone]

## 2020-08-21 NOTE — TELEPHONE ENCOUNTER
Spoke with pt, answered all her questions and informed her to stop blood thinners 5 days before surgery

## 2020-08-28 ENCOUNTER — HOSPITAL ENCOUNTER (OUTPATIENT)
Dept: PREADMISSION TESTING | Facility: HOSPITAL | Age: 55
Discharge: HOME | End: 2020-08-28
Attending: ORTHOPAEDIC SURGERY
Payer: MEDICAID

## 2020-08-28 VITALS — WEIGHT: 225 LBS | BODY MASS INDEX: 39.87 KG/M2 | HEIGHT: 63 IN

## 2020-08-28 DIAGNOSIS — Z01.818 PREOP EXAMINATION: Primary | ICD-10-CM

## 2020-08-28 DIAGNOSIS — M65.311 TRIGGER FINGER OF RIGHT THUMB: ICD-10-CM

## 2020-08-28 DIAGNOSIS — Z01.818 PRE-OP EXAM: ICD-10-CM

## 2020-08-28 DIAGNOSIS — Z03.818 ENCOUNTER FOR OBSERVATION FOR SUSPECTED EXPOSURE TO OTHER BIOLOGICAL AGENTS RULED OUT: ICD-10-CM

## 2020-08-28 LAB
ALBUMIN SERPL BCP-MCNC: 4 G/DL (ref 3.5–5.2)
ALP SERPL-CCNC: 101 U/L (ref 55–135)
ALT SERPL W/O P-5'-P-CCNC: 26 U/L (ref 10–44)
ANION GAP SERPL CALC-SCNC: 10 MMOL/L (ref 8–16)
AST SERPL-CCNC: 16 U/L (ref 10–40)
BASOPHILS # BLD AUTO: 0.02 K/UL (ref 0–0.2)
BASOPHILS NFR BLD: 0.3 % (ref 0–1.9)
BILIRUB SERPL-MCNC: 0.4 MG/DL (ref 0.1–1)
BUN SERPL-MCNC: 17 MG/DL (ref 6–20)
CALCIUM SERPL-MCNC: 9 MG/DL (ref 8.7–10.5)
CHLORIDE SERPL-SCNC: 107 MMOL/L (ref 95–110)
CO2 SERPL-SCNC: 26 MMOL/L (ref 23–29)
CREAT SERPL-MCNC: 0.8 MG/DL (ref 0.5–1.4)
DIFFERENTIAL METHOD: ABNORMAL
EOSINOPHIL # BLD AUTO: 0.1 K/UL (ref 0–0.5)
EOSINOPHIL NFR BLD: 2 % (ref 0–8)
ERYTHROCYTE [DISTWIDTH] IN BLOOD BY AUTOMATED COUNT: 17.2 % (ref 11.5–14.5)
EST. GFR  (AFRICAN AMERICAN): >60 ML/MIN/1.73 M^2
EST. GFR  (NON AFRICAN AMERICAN): >60 ML/MIN/1.73 M^2
GLUCOSE SERPL-MCNC: 92 MG/DL (ref 70–110)
HCT VFR BLD AUTO: 39.7 % (ref 37–48.5)
HGB BLD-MCNC: 11.6 G/DL (ref 12–16)
IMM GRANULOCYTES # BLD AUTO: 0.02 K/UL (ref 0–0.04)
IMM GRANULOCYTES NFR BLD AUTO: 0.3 % (ref 0–0.5)
LYMPHOCYTES # BLD AUTO: 2 K/UL (ref 1–4.8)
LYMPHOCYTES NFR BLD: 28.8 % (ref 18–48)
MCH RBC QN AUTO: 20.1 PG (ref 27–31)
MCHC RBC AUTO-ENTMCNC: 29.2 G/DL (ref 32–36)
MCV RBC AUTO: 69 FL (ref 82–98)
MONOCYTES # BLD AUTO: 0.4 K/UL (ref 0.3–1)
MONOCYTES NFR BLD: 5.9 % (ref 4–15)
NEUTROPHILS # BLD AUTO: 4.4 K/UL (ref 1.8–7.7)
NEUTROPHILS NFR BLD: 62.7 % (ref 38–73)
NRBC BLD-RTO: 0 /100 WBC
PLATELET # BLD AUTO: 302 K/UL (ref 150–350)
PMV BLD AUTO: 10.5 FL (ref 9.2–12.9)
POTASSIUM SERPL-SCNC: 4.3 MMOL/L (ref 3.5–5.1)
PROT SERPL-MCNC: 7 G/DL (ref 6–8.4)
RBC # BLD AUTO: 5.77 M/UL (ref 4–5.4)
SODIUM SERPL-SCNC: 143 MMOL/L (ref 136–145)
WBC # BLD AUTO: 6.99 K/UL (ref 3.9–12.7)

## 2020-08-28 PROCEDURE — 36415 COLL VENOUS BLD VENIPUNCTURE: CPT

## 2020-08-28 PROCEDURE — 99900104 DSU ONLY-NO CHARGE-EA ADD'L HR (STAT)

## 2020-08-28 PROCEDURE — 93005 ELECTROCARDIOGRAM TRACING: CPT

## 2020-08-28 PROCEDURE — 85025 COMPLETE CBC W/AUTO DIFF WBC: CPT

## 2020-08-28 PROCEDURE — 80053 COMPREHEN METABOLIC PANEL: CPT

## 2020-08-28 PROCEDURE — 99900103 DSU ONLY-NO CHARGE-INITIAL HR (STAT)

## 2020-08-28 RX ORDER — IBUPROFEN 200 MG
200 TABLET ORAL EVERY 6 HOURS PRN
COMMUNITY
End: 2022-12-13

## 2020-08-28 NOTE — DISCHARGE INSTRUCTIONS
To confirm, Your doctor has instructed you that surgery is scheduled for: 9/8/2020    Please report to [facility] the morning of surgery. You must check-in and receive a wristband before going to your procedure.    Pre-Op will call the afternoon prior to surgery between 1:00 and 6:00 PM with the final arrival time.  Phone number: [phone]    PLEASE NOTE:  The surgery schedule has many variables which may affect the time of your surgery case.  Family members should be available if your surgery time changes.  Plan to be here the day of your procedure between 4-6 hours.    MEDICATIONS:  TAKE ONLY THESE MEDICATIONS WITH A SMALL SIP OF WATER THE MORNING OF YOUR PROCEDURE:  INHALER    STAY ON ALL NIGHT MEDICATIONS      DO NOT TAKE THESE MEDICATIONS 5-7 DAYS PRIOR to your procedure or per your surgeon's request:    ALEVE, ADVIL, IBUPROFEN, FISH OIL VITAMIN E, HERBALS  (May take Tylenol)    ASPIRIN PER DR. TAN OR CARDIOLOGIST  ONLY if you are prescribed any types of blood thinners such as:  Aspirin, Coumadin, Plavix, Pradaxa, Xarelto, Aggrenox, Effient, Eliquis, Savasya, Brilinta, or any other, ask your surgeon whether you should stop taking them and how long before surgery you should stop.  You may also need to verify with the prescribing physician if it is ok to stop your medication.      INSTRUCTIONS IMPORTANT!!  · Do not eat or drink anything between midnight and the time of your procedure- this includes gum, mints, and candy.  · Do not smoke or drink alcoholic beverages 24 hours prior to your procedure.  · Shower the night before AND the morning of your procedure with a Chlorhexidine wash such as Hibiclens or Dial antibacterial soap from the neck down.  Do not get it on your face or in your eyes.  You may use your own shampoo and face wash. This helps your skin to be as bacteria free as possible.    · If you wear contact lenses, dentures, hearing aids or glasses, bring a  container to put them in during surgery and give to a family member for safe keeping.  Please leave all jewelry, piercing's and valuables at home.   · DO NOT remove hair from the surgery site.  Do not shave the incision site unless you are given specific instructions to do so.    · ONLY if you have been diagnosed with sleep apnea please bring your C-PAP machine.  · ONLY if you wear home oxygen please bring your portable oxygen tank the day of your procedure.  · ONLY if you have a history of OPEN HEART SURGERY you will need a clearance from your Cardiologist per Anesthesia.      · ONLY for patients requiring bowel prep, written instructions will be given by your doctor's office.  · ONLY if you have a neuro stimulator, please bring the controller with you the morning of surgery  · ONLY if a type and screen test is needed before surgery, please return: n/a  · If your doctor has scheduled you for an overnight stay, bring a small overnight bag with any personal items you need.  · Make arrangements in advance for transportation home by a responsible adult.  It is not safe to drive a vehicle during the 24 hours after anesthesia.     · ONLY ONE VISITOR PER PATIENT IS ALLOWED TO COME IN THE HOSPITAL THE DAY OF PROCEDURE.   · Visiting hours are 8:00AM-6:00PM. For the safety of all patients, visitors under the age of 12 are not allowed above the first floor of the hospital.    · All [facility] facilities and properties are tobacco free.  Smoking is NOT allowed.       If you have any questions about these instructions, call Pre-Op Admit  Nursing at [phone] or the Pre-Op Day Surgery Unit at [phone].

## 2020-09-05 ENCOUNTER — LAB VISIT (OUTPATIENT)
Dept: PRIMARY CARE CLINIC | Facility: CLINIC | Age: 55
End: 2020-09-05
Payer: MEDICAID

## 2020-09-05 DIAGNOSIS — Z03.818 ENCOUNTER FOR OBSERVATION FOR SUSPECTED EXPOSURE TO OTHER BIOLOGICAL AGENTS RULED OUT: ICD-10-CM

## 2020-09-05 PROCEDURE — U0003 INFECTIOUS AGENT DETECTION BY NUCLEIC ACID (DNA OR RNA); SEVERE ACUTE RESPIRATORY SYNDROME CORONAVIRUS 2 (SARS-COV-2) (CORONAVIRUS DISEASE [COVID-19]), AMPLIFIED PROBE TECHNIQUE, MAKING USE OF HIGH THROUGHPUT TECHNOLOGIES AS DESCRIBED BY CMS-2020-01-R: HCPCS

## 2020-09-06 ENCOUNTER — ANESTHESIA EVENT (OUTPATIENT)
Dept: SURGERY | Facility: HOSPITAL | Age: 55
End: 2020-09-06
Payer: MEDICAID

## 2020-09-06 LAB — SARS-COV-2 RNA RESP QL NAA+PROBE: NOT DETECTED

## 2020-09-08 ENCOUNTER — HOSPITAL ENCOUNTER (OUTPATIENT)
Facility: HOSPITAL | Age: 55
Discharge: HOME | End: 2020-09-08
Attending: ORTHOPAEDIC SURGERY | Admitting: ORTHOPAEDIC SURGERY
Payer: MEDICAID

## 2020-09-08 ENCOUNTER — ANESTHESIA (OUTPATIENT)
Dept: SURGERY | Facility: HOSPITAL | Age: 55
End: 2020-09-08
Payer: MEDICAID

## 2020-09-08 VITALS
RESPIRATION RATE: 16 BRPM | OXYGEN SATURATION: 97 % | WEIGHT: 225 LBS | HEIGHT: 63 IN | TEMPERATURE: 98 F | HEART RATE: 64 BPM | BODY MASS INDEX: 39.87 KG/M2 | DIASTOLIC BLOOD PRESSURE: 56 MMHG | SYSTOLIC BLOOD PRESSURE: 136 MMHG

## 2020-09-08 DIAGNOSIS — M65.311 TRIGGER FINGER OF RIGHT THUMB: ICD-10-CM

## 2020-09-08 PROCEDURE — D9220A PRA ANESTHESIA: ICD-10-PCS | Mod: CRNA,,, | Performed by: NURSE ANESTHETIST, CERTIFIED REGISTERED

## 2020-09-08 PROCEDURE — D9220A PRA ANESTHESIA: Mod: ANES,,, | Performed by: ANESTHESIOLOGY

## 2020-09-08 PROCEDURE — D9220A PRA ANESTHESIA: Mod: CRNA,,, | Performed by: NURSE ANESTHETIST, CERTIFIED REGISTERED

## 2020-09-08 PROCEDURE — 25000003 PHARM REV CODE 250: Performed by: ANESTHESIOLOGY

## 2020-09-08 PROCEDURE — 71000033 HC RECOVERY, INTIAL HOUR: Performed by: ORTHOPAEDIC SURGERY

## 2020-09-08 PROCEDURE — 01610 ANES ALL PX NRV MUSC SHO&AX: CPT | Performed by: ORTHOPAEDIC SURGERY

## 2020-09-08 PROCEDURE — 71000039 HC RECOVERY, EACH ADD'L HOUR: Performed by: ORTHOPAEDIC SURGERY

## 2020-09-08 PROCEDURE — 36000707: Performed by: ORTHOPAEDIC SURGERY

## 2020-09-08 PROCEDURE — 99900104 DSU ONLY-NO CHARGE-EA ADD'L HR (STAT): Performed by: ORTHOPAEDIC SURGERY

## 2020-09-08 PROCEDURE — 37000008 HC ANESTHESIA 1ST 15 MINUTES: Performed by: ORTHOPAEDIC SURGERY

## 2020-09-08 PROCEDURE — 25000003 PHARM REV CODE 250: Performed by: NURSE ANESTHETIST, CERTIFIED REGISTERED

## 2020-09-08 PROCEDURE — 63600175 PHARM REV CODE 636 W HCPCS: Performed by: ORTHOPAEDIC SURGERY

## 2020-09-08 PROCEDURE — 63600175 PHARM REV CODE 636 W HCPCS: Performed by: ANESTHESIOLOGY

## 2020-09-08 PROCEDURE — 99900103 DSU ONLY-NO CHARGE-INITIAL HR (STAT): Performed by: ORTHOPAEDIC SURGERY

## 2020-09-08 PROCEDURE — 37000009 HC ANESTHESIA EA ADD 15 MINS: Performed by: ORTHOPAEDIC SURGERY

## 2020-09-08 PROCEDURE — D9220A PRA ANESTHESIA: ICD-10-PCS | Mod: ANES,,, | Performed by: ANESTHESIOLOGY

## 2020-09-08 PROCEDURE — 71000015 HC POSTOP RECOV 1ST HR: Performed by: ORTHOPAEDIC SURGERY

## 2020-09-08 PROCEDURE — 36000706: Performed by: ORTHOPAEDIC SURGERY

## 2020-09-08 PROCEDURE — 25000003 PHARM REV CODE 250: Performed by: ORTHOPAEDIC SURGERY

## 2020-09-08 PROCEDURE — 01810 ANES PX NRV MUSC F/ARM WRST: CPT | Performed by: ORTHOPAEDIC SURGERY

## 2020-09-08 PROCEDURE — 63600175 PHARM REV CODE 636 W HCPCS: Performed by: NURSE ANESTHETIST, CERTIFIED REGISTERED

## 2020-09-08 RX ORDER — KETOROLAC TROMETHAMINE 30 MG/ML
INJECTION, SOLUTION INTRAMUSCULAR; INTRAVENOUS
Status: DISCONTINUED | OUTPATIENT
Start: 2020-09-08 | End: 2020-09-08

## 2020-09-08 RX ORDER — ONDANSETRON HYDROCHLORIDE 2 MG/ML
INJECTION, SOLUTION INTRAMUSCULAR; INTRAVENOUS
Status: DISCONTINUED | OUTPATIENT
Start: 2020-09-08 | End: 2020-09-08

## 2020-09-08 RX ORDER — FENTANYL CITRATE 50 UG/ML
INJECTION, SOLUTION INTRAMUSCULAR; INTRAVENOUS
Status: DISCONTINUED | OUTPATIENT
Start: 2020-09-08 | End: 2020-09-08

## 2020-09-08 RX ORDER — MIDAZOLAM HYDROCHLORIDE 1 MG/ML
INJECTION INTRAMUSCULAR; INTRAVENOUS
Status: DISCONTINUED | OUTPATIENT
Start: 2020-09-08 | End: 2020-09-08

## 2020-09-08 RX ORDER — HYDROCODONE BITARTRATE AND ACETAMINOPHEN 5; 325 MG/1; MG/1
1 TABLET ORAL EVERY 4 HOURS PRN
Qty: 12 TABLET | Refills: 0 | Status: SHIPPED | OUTPATIENT
Start: 2020-09-08 | End: 2020-09-21

## 2020-09-08 RX ORDER — SODIUM CHLORIDE 0.9 % (FLUSH) 0.9 %
10 SYRINGE (ML) INJECTION
Status: DISCONTINUED | OUTPATIENT
Start: 2020-09-08 | End: 2020-09-08 | Stop reason: HOSPADM

## 2020-09-08 RX ORDER — LIDOCAINE HYDROCHLORIDE 10 MG/ML
INJECTION, SOLUTION EPIDURAL; INFILTRATION; INTRACAUDAL; PERINEURAL
Status: DISCONTINUED | OUTPATIENT
Start: 2020-09-08 | End: 2020-09-08 | Stop reason: HOSPADM

## 2020-09-08 RX ORDER — METOCLOPRAMIDE HYDROCHLORIDE 5 MG/ML
10 INJECTION INTRAMUSCULAR; INTRAVENOUS EVERY 10 MIN PRN
Status: DISCONTINUED | OUTPATIENT
Start: 2020-09-08 | End: 2020-09-08 | Stop reason: HOSPADM

## 2020-09-08 RX ORDER — LIDOCAINE HCL/PF 100 MG/5ML
SYRINGE (ML) INTRAVENOUS
Status: DISCONTINUED | OUTPATIENT
Start: 2020-09-08 | End: 2020-09-08

## 2020-09-08 RX ORDER — ONDANSETRON 2 MG/ML
4 INJECTION INTRAMUSCULAR; INTRAVENOUS EVERY 12 HOURS PRN
Status: CANCELLED | OUTPATIENT
Start: 2020-09-08

## 2020-09-08 RX ORDER — DEXAMETHASONE SODIUM PHOSPHATE 4 MG/ML
INJECTION, SOLUTION INTRA-ARTICULAR; INTRALESIONAL; INTRAMUSCULAR; INTRAVENOUS; SOFT TISSUE
Status: DISCONTINUED | OUTPATIENT
Start: 2020-09-08 | End: 2020-09-08

## 2020-09-08 RX ORDER — METOCLOPRAMIDE HYDROCHLORIDE 5 MG/ML
5 INJECTION INTRAMUSCULAR; INTRAVENOUS EVERY 6 HOURS PRN
Status: CANCELLED | OUTPATIENT
Start: 2020-09-08

## 2020-09-08 RX ORDER — MUPIROCIN 20 MG/G
OINTMENT TOPICAL 2 TIMES DAILY
Status: CANCELLED | OUTPATIENT
Start: 2020-09-08 | End: 2020-09-13

## 2020-09-08 RX ORDER — MUPIROCIN 20 MG/G
OINTMENT TOPICAL
Status: DISCONTINUED | OUTPATIENT
Start: 2020-09-08 | End: 2020-09-08 | Stop reason: HOSPADM

## 2020-09-08 RX ORDER — OXYCODONE HYDROCHLORIDE 10 MG/1
10 TABLET ORAL EVERY 4 HOURS PRN
Status: CANCELLED | OUTPATIENT
Start: 2020-09-08

## 2020-09-08 RX ORDER — HYDROCODONE BITARTRATE AND ACETAMINOPHEN 5; 325 MG/1; MG/1
1 TABLET ORAL EVERY 4 HOURS PRN
Status: CANCELLED | OUTPATIENT
Start: 2020-09-08

## 2020-09-08 RX ORDER — LIDOCAINE HYDROCHLORIDE 10 MG/ML
1 INJECTION, SOLUTION EPIDURAL; INFILTRATION; INTRACAUDAL; PERINEURAL ONCE
Status: COMPLETED | OUTPATIENT
Start: 2020-09-08 | End: 2020-09-08

## 2020-09-08 RX ORDER — SODIUM CHLORIDE, SODIUM LACTATE, POTASSIUM CHLORIDE, CALCIUM CHLORIDE 600; 310; 30; 20 MG/100ML; MG/100ML; MG/100ML; MG/100ML
INJECTION, SOLUTION INTRAVENOUS CONTINUOUS
Status: CANCELLED | OUTPATIENT
Start: 2020-09-08

## 2020-09-08 RX ORDER — CEFAZOLIN SODIUM 2 G/50ML
2 SOLUTION INTRAVENOUS
Status: COMPLETED | OUTPATIENT
Start: 2020-09-08 | End: 2020-09-08

## 2020-09-08 RX ORDER — FENTANYL CITRATE 50 UG/ML
25 INJECTION, SOLUTION INTRAMUSCULAR; INTRAVENOUS EVERY 5 MIN PRN
Status: COMPLETED | OUTPATIENT
Start: 2020-09-08 | End: 2020-09-08

## 2020-09-08 RX ORDER — PROPOFOL 10 MG/ML
VIAL (ML) INTRAVENOUS
Status: DISCONTINUED | OUTPATIENT
Start: 2020-09-08 | End: 2020-09-08

## 2020-09-08 RX ORDER — OXYCODONE HYDROCHLORIDE 5 MG/1
5 TABLET ORAL
Status: DISCONTINUED | OUTPATIENT
Start: 2020-09-08 | End: 2020-09-08 | Stop reason: HOSPADM

## 2020-09-08 RX ADMIN — FENTANYL CITRATE 25 MCG: 50 INJECTION INTRAMUSCULAR; INTRAVENOUS at 09:09

## 2020-09-08 RX ADMIN — MIDAZOLAM HYDROCHLORIDE 2 MG: 1 INJECTION, SOLUTION INTRAMUSCULAR; INTRAVENOUS at 08:09

## 2020-09-08 RX ADMIN — KETOROLAC TROMETHAMINE 30 MG: 30 INJECTION, SOLUTION INTRAMUSCULAR; INTRAVENOUS at 08:09

## 2020-09-08 RX ADMIN — CEFAZOLIN SODIUM 2 G: 2 SOLUTION INTRAVENOUS at 08:09

## 2020-09-08 RX ADMIN — LIDOCAINE HYDROCHLORIDE 10 MG: 10 INJECTION, SOLUTION EPIDURAL; INFILTRATION; INTRACAUDAL; PERINEURAL at 07:09

## 2020-09-08 RX ADMIN — GLYCOPYRROLATE 0.2 MG: 0.2 INJECTION, SOLUTION INTRAMUSCULAR; INTRAVITREAL at 08:09

## 2020-09-08 RX ADMIN — MUPIROCIN: 20 OINTMENT TOPICAL at 07:09

## 2020-09-08 RX ADMIN — FENTANYL CITRATE 50 MCG: 50 INJECTION, SOLUTION INTRAMUSCULAR; INTRAVENOUS at 08:09

## 2020-09-08 RX ADMIN — LIDOCAINE HYDROCHLORIDE 100 MG: 20 INJECTION, SOLUTION INTRAVENOUS at 08:09

## 2020-09-08 RX ADMIN — ONDANSETRON 4 MG: 2 INJECTION, SOLUTION INTRAMUSCULAR; INTRAVENOUS at 08:09

## 2020-09-08 RX ADMIN — SODIUM CHLORIDE, SODIUM GLUCONATE, SODIUM ACETATE, POTASSIUM CHLORIDE, MAGNESIUM CHLORIDE, SODIUM PHOSPHATE, DIBASIC, AND POTASSIUM PHOSPHATE: .53; .5; .37; .037; .03; .012; .00082 INJECTION, SOLUTION INTRAVENOUS at 07:09

## 2020-09-08 RX ADMIN — DEXAMETHASONE SODIUM PHOSPHATE 4 MG: 4 INJECTION, SOLUTION INTRA-ARTICULAR; INTRALESIONAL; INTRAMUSCULAR; INTRAVENOUS; SOFT TISSUE at 08:09

## 2020-09-08 RX ADMIN — PROPOFOL 200 MG: 10 INJECTION, EMULSION INTRAVENOUS at 08:09

## 2020-09-08 RX ADMIN — OXYCODONE HYDROCHLORIDE 5 MG: 5 TABLET ORAL at 09:09

## 2020-09-08 NOTE — TRANSFER OF CARE
"Anesthesia Transfer of Care Note    Patient: Zoila Moreland    Procedure(s) Performed: Procedure(s) (LRB):  RELEASE, TRIGGER FINGER R-thumb (Right)    Patient location: PACU    Anesthesia Type: general    Transport from OR: Transported from OR on 2-3 L/min O2 by NC with adequate spontaneous ventilation    Post pain: adequate analgesia    Post assessment: no apparent anesthetic complications and tolerated procedure well    Post vital signs: stable    Level of consciousness: sedated    Nausea/Vomiting: no nausea/vomiting    Complications: none    Transfer of care protocol was followed      Last vitals:   Visit Vitals  BP (!) 197/89   Pulse 65   Temp 36.3 °C (97.4 °F) (Skin)   Resp 16   Ht 5' 3" (1.6 m)   Wt 102.1 kg (225 lb)   LMP  (LMP Unknown)   SpO2 100%   Breastfeeding No   BMI 39.86 kg/m²     "

## 2020-09-08 NOTE — PLAN OF CARE
Released from Pacu when criteria met pain controlled skin w+d No nausea No emesis dsg dry intact aaox4 encouraged deep breaths Pt has all belongings  With  states pain tolerable at at 3 /10 dr Larry at bedside released

## 2020-09-08 NOTE — PLAN OF CARE
Discharge instructions given to the pt and her   Pt denies increased pain  Level is 4/10    All instructions were given and they were able to teach back information    Ice is applied to the affected right arm,hand area    Pt is tolerating po fluids and denies nausea

## 2020-09-08 NOTE — DISCHARGE INSTRUCTIONS
General Information:    1.  Do not drink alcoholic beverages including beer for 24 hours or as long as you are on pain medication..  2.  Do not drive a motor vehicle, operate machinery or power tools, or signs legal papers for 24 hours or as long as you are on pain medication.   3.  You may experience light-headedness, dizziness, and sleepiness following surgery. Please do not stay alone. A responsible adult should be with you for this 24 hour period.  4.  Go home and rest.    5. Progress slowly to a normal diet unless instructed.  Otherwise, begin with liquids such as soft drinks, then soup and crackers working up to solid foods. Drink plenty of nonalcoholic fluids.  6.  Certain anesthetics and pain medications produce nausea and vomiting in certain       individuals. If nausea becomes a problem at home, call you doctor.    7. A nurse will be calling you sometime after surgery. Do not be alarmed. This is our way of finding out how you are doing.    8. Several times every hour while you are awake, take 2-3 deep breaths and cough. If you had stomach surgery hold a pillow or rolled towel firmly against your stomach before you cough. This will help with any pain the cough might cause.  9. Several times every hour while you are awake, pump and flex your feet 5-6 times and do foot circles. This will help prevent blood clots.    10.Call your doctor for severe pain, bleeding, fever, or signs or symptoms of infection (pain, swelling, redness, foul odor, drainage).    Discharge Instructions: After Your Surgery/Procedure  Youve just had surgery. During surgery you were given medicine called anesthesia to keep you relaxed and free of pain. After surgery you may have some pain or nausea. This is common. Here are some tips for feeling better and getting well after surgery.     Stay on schedule with your medication.   Going home  Your doctor or nurse will show you how to take care of yourself when you go home. He or she will  "also answer your questions. Have an adult family member or friend drive you home.      For your safety we recommend these precaution for the first 24 hours after your procedure:  · Do not drive or use heavy equipment.  · Do not make important decisions or sign legal papers.  · Do not drink alcohol.  · Have someone stay with you, if needed. He or she can watch for problems and help keep you safe.  · Your concentration, balance, coordination, and judgement may be impaired for many hours after anesthesia.  Use caution when ambulating or standing up.     · You may feel weak and "washed out" after anesthesia and surgery.      Subtle residual effects of general anesthesia or sedation with regional / local anesthesia can last more than 24 hours.  Rest for the remainder of the day or longer if your Doctor/Surgeon has advised you to do so.  Although you may feel normal within the first 24 hours, your reflexes and mental ability may be impaired without you realizing it.  You may feel dizzy, lightheaded or sleepy for 24 hours or longer.      Be sure to go to all follow-up visits with your doctor. And rest after your surgery for as long as your doctor tells you to.  Coping with pain  If you have pain after surgery, pain medicine will help you feel better. Take it as told, before pain becomes severe. Also, ask your doctor or pharmacist about other ways to control pain. This might be with heat, ice, or relaxation. And follow any other instructions your surgeon or nurse gives you.  Tips for taking pain medicine  To get the best relief possible, remember these points:  · Pain medicines can upset your stomach. Taking them with a little food may help.  · Most pain relievers taken by mouth need at least 20 to 30 minutes to start to work.  · Taking medicine on a schedule can help you remember to take it. Try to time your medicine so that you can take it before starting an activity. This might be before you get dressed, go for a walk, " or sit down for dinner.  · Constipation is a common side effect of pain medicines. Call your doctor before taking any medicines such as laxatives or stool softeners to help ease constipation. Also ask if you should skip any foods. Drinking lots of fluids and eating foods such as fruits and vegetables that are high in fiber can also help. Remember, do not take laxatives unless your surgeon has prescribed them.  · Drinking alcohol and taking pain medicine can cause dizziness and slow your breathing. It can even be deadly. Do not drink alcohol while taking pain medicine.  · Pain medicine can make you react more slowly to things. Do not drive or run machinery while taking pain medicine.  Your health care provider may tell you to take acetaminophen to help ease your pain. Ask him or her how much you are supposed to take each day. Acetaminophen or other pain relievers may interact with your prescription medicines or other over-the-counter (OTC) drugs. Some prescription medicines have acetaminophen and other ingredients. Using both prescription and OTC acetaminophen for pain can cause you to overdose. Read the labels on your OTC medicines with care. This will help you to clearly know the list of ingredients, how much to take, and any warnings. It may also help you not take too much acetaminophen. If you have questions or do not understand the information, ask your pharmacist or health care provider to explain it to you before you take the OTC medicine.  Managing nausea  Some people have an upset stomach after surgery. This is often because of anesthesia, pain, or pain medicine, or the stress of surgery. These tips will help you handle nausea and eat healthy foods as you get better. If you were on a special food plan before surgery, ask your doctor if you should follow it while you get better. These tips may help:  · Do not push yourself to eat. Your body will tell you when to eat and how much.  · Start off with clear  liquids and soup. They are easier to digest.  · Next try semi-solid foods, such as mashed potatoes, applesauce, and gelatin, as you feel ready.  · Slowly move to solid foods. Dont eat fatty, rich, or spicy foods at first.  · Do not force yourself to have 3 large meals a day. Instead eat smaller amounts more often.  · Take pain medicines with a small amount of solid food, such as crackers or toast, to avoid nausea.     Call your surgeon if  · You still have pain an hour after taking medicine. The medicine may not be strong enough.  · You feel too sleepy, dizzy, or groggy. The medicine may be too strong.  · You have side effects like nausea, vomiting, or skin changes, such as rash, itching, or hives.       If you have obstructive sleep apnea  You were given anesthesia medicine during surgery to keep you comfortable and free of pain. After surgery, you may have more apnea spells because of this medicine and other medicines you were given. The spells may last longer than usual.   At home:  · Keep using the continuous positive airway pressure (CPAP) device when you sleep. Unless your health care provider tells you not to, use it when you sleep, day or night. CPAP is a common device used to treat obstructive sleep apnea.  · Talk with your provider before taking any pain medicine, muscle relaxants, or sedatives. Your provider will tell you about the possible dangers of taking these medicines.  © 6563-5427 [employer]. [address] [place] [address]. All rights reserved. This information is not intended as a substitute for professional medical care. Always follow your healthcare professional's instructions.      Post op instructions for prevention of DVT  What is deep vein thrombosis?  Deep vein thrombosis (DVT) is the medical term for blood clots in the deep veins of the leg.  These blood clots can be dangerous.  A DVT can block a blood vessel and keep blood from getting where it needs to go.   Another problem is that the clot can travel to other parts of the body such as the lungs.  A clot that travels to the lungs is called a pulmonary embolus (PE) and can cause serious problems with breathing which can lead to death.  Am I at risk for DVT/PE?  If you are not very active, you are at risk of DVT.  Anyone confined to bed, sitting for long periods of time, recovering from surgery, etc. increases the risk of DVT.  Other risk factors are cancer diagnosis, certain medications, estrogen replacement in any form,older age, obesity, pregnancy, smoking, history of clotting disorders, and dehydration.  How will I know if I have a DVT?   Swelling in the lower leg   Pain   Warmth, redness, hardness or bulging of the vein  If you have any of these symptoms, call your doctors office right away.  Some people will not have any symptoms until the clot moves to the lungs.  What are the symptoms of a PE?   Panting, shortness of breath, or trouble breathing   Sharp, knife-like chest pain when you breathe   Coughing or coughing up blood   Rapid heartbeat  If you have any of these symptoms or get worse quickly, call 911 for emergency treatment.  How can I prevent a DVT?   Avoid long periods of inactivity and dont cross your legs--get up and walk around every hour or so.   Stay active--walking after surgery is highly encouraged.  This means you should get out of the house and walk in the neighborhood.  Going up and down stairs will not impair healing (unless advised against such activity by your doctor).     Drink plenty of noncaffeinated, nonalcoholic fluids each day to prevent dehydration.   Wear special support stockings, if they have been advised by your doctor.   If you travel, stop at least once an hour and walk around.   Avoid smoking (assistance with stopping is available through your healthcare provider)  Always notify your doctor if you are not able to follow the post operative instructions that are  given to you at the time of discharge.  It may be necessary to prescribe one of the medications available to prevent DVT.Using Opioids for Pain Management     Your doctor has given instructions for you to take an opioid.  This is a drug for bad pain.  It helps control pain without causing bleeding and kidney problems.  Common opioid names are morphine, hydromorphone, oxycodone, and methadone. These drugs are called narcotics.    There are several safety concerns you need to know.     · It is against the law to give or sell this drug to another person.  You must keep this medicine safely locked.    · You may have side effects from taking this medication.  These include nausea, itching, sweating, sleepiness, a change in your ability to breathe, and depression.  · Do not take alcohol or sleeping pills opioids.    · Long-term opoid use may no longer giver you relief from pain.  It can cause you stomach pain, mental anxiety, and headaches.  Long-term opoid use can potentially lead to unlawful street drug abuse and reduce your ability to stay employed.    · Your body may become opioid tolerant if you need to take more to get relief.    · You must stop taking opioids if you begin having more pain as a result of the medicine.    · Opioid withdrawal occurs when you have to stop taking the drug.  It can cause you to have nausea, vomiting, diarrhea, stomach pain, anxiety, and dilated pupils in your eyes. This condition means you are opioid dependent.    · Addiction is a drug induced brain disease. It means there are changes in how your brain is working.  Children, teens, and young adults under 25 years old are more likely to get addicted to opioids.      · Addiction can happen with repeated opioid use.  It does not happen with short-term use of two weeks or less.       For more information, please speak with your doctor or pharmacist.      We hope your stay was comfortable as you heal now, mend and rest.    For we have enjoyed  taking care of you by giving your our best.    And as you get better, by regaining your health and strength;   We count it as a privilege to have served you and hope your time at [facility] was well spent.      Thank  You!!!

## 2020-09-08 NOTE — ANESTHESIA PROCEDURE NOTES
Intubation  Performed by: Brian House CRNA  Authorized by: Danny Larry MD     Intubation:     Attempted By:  CRNA    Difficult Airway Encountered?: No      Complications:  None    Airway Device:  Oral endotracheal tube    Airway Device Size:  3.0

## 2020-09-08 NOTE — PLAN OF CARE
Pt discharged and all of her belongings were returned to  The pt and she said that all is accounted for, her  at her side  sling right arm on and instructed on with the  and the pt

## 2020-09-08 NOTE — BRIEF OP NOTE
[facility]  Brief Operative Note     SUMMARY     Surgery Date: 9/8/2020     Surgeon(s) and Role:     * Sam Bangura Jr., MD - Primary    Assisting Surgeon: None    Pre-op Diagnosis:  Trigger finger of right thumb [M65.311]    Post-op Diagnosis:  Post-Op Diagnosis Codes:     * Trigger finger of right thumb [M65.311]    Procedure(s) (LRB):  RELEASE, TRIGGER FINGER R-thumb (Right)    Anesthesia: Choice    Description of the findings of the procedure:t f r  Estimated Blood Loss: * No values recorded between 9/8/2020  8:20 AM and 9/8/2020  8:30 AM *         Specimens:   Specimen (12h ago, onward)    None          Discharge Note    SUMMARY     Admit Date: 9/8/2020    Discharge Date and Time:  09/08/2020 8:30 AM    Hospital Course (synopsis of major diagnoses, care, treatment, and services provided during the course of the hospital stay): Uneventful Outpatient Surgery     Final Diagnosis: Post-Op Diagnosis Codes:     * Trigger finger of right thumb [M65.311]    Disposition: Home or Self Care    Follow Up/Patient Instructions:     Medications:  Reconciled Home Medications:   Current Discharge Medication List      START taking these medications    Details   HYDROcodone-acetaminophen (NORCO) 5-325 mg per tablet Take 1 tablet by mouth every 4 (four) hours as needed for Pain.  Qty: 12 tablet, Refills: 0    Comments: Quantity prescribed more than 7 day supply? No         CONTINUE these medications which have NOT CHANGED    Details   ascorbic acid (VITAMIN C) 1000 MG tablet Take 1,000 mg by mouth once daily.      atenoloL (TENORMIN) 50 MG tablet Take 1 tablet (50 mg total) by mouth once daily.  Qty: 90 tablet, Refills: 3    Associated Diagnoses: Essential hypertension      atorvastatin (LIPITOR) 80 MG tablet Take 1 tablet (80 mg total) by mouth every evening.  Qty: 90 tablet, Refills: 3    Associated Diagnoses: Mixed hyperlipidemia; Coronary artery disease of native artery of native heart with stable angina  pectoris      calcium-vitamin D (OSCAL) 250 (625)-125 mg-unit per tablet Take 1 tablet by mouth once daily.      levalbuterol (XOPENEX HFA) 45 mcg/actuation inhaler Inhale 1-2 puffs into the lungs every 4 (four) hours as needed for Wheezing. Rescue  Qty: 1 Inhaler, Refills: 5    Associated Diagnoses: Intermittent asthma without complication, unspecified asthma severity      lisinopriL (PRINIVIL,ZESTRIL) 40 MG tablet Take 1 tablet (40 mg total) by mouth every evening.  Qty: 90 tablet, Refills: 3    Associated Diagnoses: Essential hypertension      LORazepam (ATIVAN) 0.5 MG tablet Take one tablet approximately 30 min prior to MRI. Do not drive when taking lorazepam.  Qty: 1 tablet, Refills: 0    Associated Diagnoses: Claustrophobia      pantoprazole (PROTONIX) 40 MG tablet Take 1 tablet (40 mg total) by mouth once daily.  Qty: 90 tablet, Refills: 3    Associated Diagnoses: Gastroesophageal reflux disease, esophagitis presence not specified      solifenacin (VESICARE) 10 MG tablet Take 1 tablet by mouth once daily  Qty: 90 tablet, Refills: 0    Associated Diagnoses: Urge incontinence of urine      valACYclovir (VALTREX) 500 MG tablet Take 1 tablet (500 mg total) by mouth 2 (two) times daily. As needed for flare-up  Qty: 14 tablet, Refills: 1    Associated Diagnoses: Fever blister      aspirin 81 MG Chew Take 1 tablet (81 mg total) by mouth once daily.  Qty: 1 tablet, Refills: 0      ibuprofen (ADVIL,MOTRIN) 200 MG tablet Take 200 mg by mouth every 6 (six) hours as needed for Pain.           Discharge Procedure Orders   SLING ORTHOPEDIC LARGE FOR HOME USE     Diet general     Ice to affected area     No driving, operating heavy equipment or signing legal documents while taking pain medication.     Call MD for:  temperature >100.4     Call MD for:  persistent nausea and vomiting     Call MD for:  severe uncontrolled pain     Call MD for:  difficulty breathing, headache or visual disturbances     Call MD for:  redness,  tenderness, or signs of infection (pain, swelling, redness, odor or green/yellow discharge around incision site)     Call MD for:  hives     Call MD for:  persistent dizziness or light-headedness     Call MD for:  extreme fatigue     Leave dressing on - Keep it clean, dry, and intact until clinic visit     Change dressing (specify)   Order Comments: Change bandage next week     Follow-up Information     Sam Bangura Jr, MD In 2 weeks.    Specialties: Orthopedic Surgery, Surgery  Contact information:  [facility] [address] [id number] [phone]

## 2020-09-08 NOTE — OP NOTE
[facility]  Surgery Department  Operative Note    SUMMARY     Date of Procedure: 9/8/2020     Procedure: Procedure(s) (LRB):  RELEASE, TRIGGER FINGER R-thumb (Right)     Surgeon(s) and Role:     * Sam Bangura Jr., MD - Primary    Assisting Surgeon:venkata maravilla    Pre-Operative Diagnosis: Trigger finger of right thumb [M65.311]    Post-Operative Diagnosis: Post-Op Diagnosis Codes:      Trigger finger of right thumb [M65.311]    Anesthesia: Choice    Technical Procedures Used:t f r    Description of the Findings of the Procedure.... patient taken to operating room placed under anesthesia.  Right hand prepped draped satisfactory manner examined Esmarch elevated tourniquet to 250 then made a skin incision at the flexion crease base of the thumb volar went through skin subcu found the flexor tendon did not dissect out the neurovascular bundles but dissected the tendon free.  Identified the proximal in the A1 pulley in section A1 pulley under direct vision worked from proximal to distal that A1 pulley was significantly thickened fibrotic.  Then irrigated tendon had good excursion and closed the skin with nylon and applied a bulky dressing.    Significant Surgical Tasks Conducted by the Assistant(s), if Applicable:   Exposure of the flexor tendon pulling the hand in the appropriate position    Complications: none     Estimated Blood Loss (EBL):0  No values recorded between 9/8/2020  8:20 AM and 9/8/2020  8:31 AM             Specimens:   Specimen (12h ago, onward)    None                  Condition:  good    Disposition: PACU - hemodynamically stable.    Attestation: I was present and scrubbed for the entire procedure.

## 2020-09-08 NOTE — ANESTHESIA PREPROCEDURE EVALUATION
09/08/2020  Zoila Moreland is a 54 y.o., female.    Anesthesia Evaluation    I have reviewed the Patient Summary Reports.    I have reviewed the Nursing Notes. I have reviewed the NPO Status.   I have reviewed the Medications.     Review of Systems  Anesthesia Hx:  Denies Family Hx of Anesthesia complications.   Denies Personal Hx of Anesthesia complications.   Cardiovascular:   Hypertension CAD asymptomatic CABG/stent   Denies Angina. ECG has been reviewed.    Pulmonary:   Asthma mild Shortness of breath        Physical Exam  General:  Morbid Obesity    Airway/Jaw/Neck:  Airway Findings: Mouth Opening: Normal Tongue: Normal  General Airway Assessment: Adult  Mallampati: III  Improves to II with phonation.  TM Distance: Normal, at least 6 cm  Jaw/Neck Findings:  Neck ROM: Normal ROM  Neck Findings:  Girth Increased     Eyes/Ears/Nose:  EYES/EARS/NOSE FINDINGS: Normal   Dental:  DENTAL FINDINGS: Normal   Chest/Lungs:  Chest/Lungs Findings: Normal Respiratory Rate     Heart/Vascular:  Heart Findings: Rate: Normal  Rhythm: Regular Rhythm        Mental Status:  Mental Status Findings:  Cooperative, Alert and Oriented         Anesthesia Plan  Type of Anesthesia, risks & benefits discussed:  Anesthesia Type:  general  Patient's Preference:   Intra-op Monitoring Plan: standard ASA monitors  Intra-op Monitoring Plan Comments:   Post Op Pain Control Plan: multimodal analgesia  Post Op Pain Control Plan Comments:   Induction:   IV  Beta Blocker:  Patient is on a Beta-Blocker and has received one dose within the past 24 hours (No further documentation required).       Informed Consent: Patient understands risks and agrees with Anesthesia plan.  Questions answered. Anesthesia consent signed with patient.  ASA Score: 3     Day of Surgery Review of History & Physical:    H&P update referred to the surgeon.          Ready For Surgery From Anesthesia Perspective.

## 2020-09-08 NOTE — PLAN OF CARE
Pt personal mask pink in color placed in her belonging bag/and her clothes were placed in her bag/pt placed her phone in the belonging bag and her  agrees to keep his wife's belongings.  Pt will need her glasses to  Go to recovery because she cannot see clear without her glasses.  Glasses were labeled and taken to PACU

## 2020-09-09 NOTE — ANESTHESIA POSTPROCEDURE EVALUATION
Anesthesia Post Evaluation    Patient: Zoila Moreland    Procedure(s) Performed: Procedure(s) (LRB):  RELEASE, TRIGGER FINGER R-thumb (Right)    Final Anesthesia Type: general    Patient location during evaluation: PACU  Patient participation: Yes- Able to Participate  Level of consciousness: awake and alert  Post-procedure vital signs: reviewed and stable  Pain management: adequate  Airway patency: patent    PONV status at discharge: No PONV  Anesthetic complications: no      Cardiovascular status: blood pressure returned to baseline  Respiratory status: unassisted  Hydration status: euvolemic  Follow-up not needed.          Vitals Value Taken Time   /56 09/08/20 1000   Temp 36.6 °C (97.9 °F) 09/08/20 1000   Pulse 64 09/08/20 1000   Resp 16 09/08/20 1000   SpO2 97 % 09/08/20 1000         Event Time   Out of Recovery 09:50:00         Pain/Oscar Score: Pain Rating Prior to Med Admin: 6 (9/8/2020  9:45 AM)  Pain Rating Post Med Admin: 3 (9/8/2020  9:45 AM)  Oscar Score: 10 (9/8/2020 10:00 AM)

## 2020-09-21 ENCOUNTER — TELEPHONE (OUTPATIENT)
Dept: ORTHOPEDICS | Facility: CLINIC | Age: 55
End: 2020-09-21

## 2020-09-21 ENCOUNTER — OFFICE VISIT (OUTPATIENT)
Dept: ORTHOPEDICS | Facility: CLINIC | Age: 55
End: 2020-09-21
Payer: MEDICAID

## 2020-09-21 VITALS
SYSTOLIC BLOOD PRESSURE: 131 MMHG | BODY MASS INDEX: 39.87 KG/M2 | WEIGHT: 225 LBS | DIASTOLIC BLOOD PRESSURE: 75 MMHG | HEART RATE: 55 BPM | HEIGHT: 63 IN

## 2020-09-21 DIAGNOSIS — Z98.890 STATUS POST TRIGGER FINGER RELEASE: Primary | ICD-10-CM

## 2020-09-21 PROCEDURE — 99024 POSTOP FOLLOW-UP VISIT: CPT | Mod: S$GLB,,, | Performed by: ORTHOPAEDIC SURGERY

## 2020-09-21 PROCEDURE — 99024 PR POST-OP FOLLOW-UP VISIT: ICD-10-PCS | Mod: S$GLB,,, | Performed by: ORTHOPAEDIC SURGERY

## 2020-09-21 NOTE — PROGRESS NOTES
[facility] ORTH[facility] POST-OP NOTE    Subjective:           Chief Complaint:   Chief Complaint   Patient presents with    Right Hand - Post-op Evaluation     Right hand Thumb TFR 9.8.2020. Suture removal. States that she is still a little tender. Other than that she is good.        Past Medical History:   Diagnosis Date    Allergy     Anemia 3/30/2020    Asthma     Fever blister     Hypertension        Past Surgical History:   Procedure Laterality Date    BILATERAL SALPINGOOPHORECTOMY Bilateral 5/18/2007    Torsion of ovaries and tubes    BREAST BIOPSY Right     (-)    CARDIAC CATHETERIZATION      cardiac stents  01/2017    1 stent    HYSTERECTOMY  1/13/2006    uterus and cervix due to AUB    OOPHORECTOMY      TRIGGER FINGER RELEASE Right 9/8/2020    Procedure: RELEASE, TRIGGER FINGER R-thumb;  Surgeon: Sam Bangura Jr., MD;  [facility]: [facility];  Service: Orthopedics;  Laterality: Right;       Current Outpatient Medications   Medication Sig    ascorbic acid (VITAMIN C) 1000 MG tablet Take 1,000 mg by mouth once daily.    atenoloL (TENORMIN) 50 MG tablet Take 1 tablet (50 mg total) by mouth once daily. (Patient taking differently: Take 50 mg by mouth every evening. )    atorvastatin (LIPITOR) 80 MG tablet Take 1 tablet (80 mg total) by mouth every evening.    calcium-vitamin D (OSCAL) 250 (625)-125 mg-unit per tablet Take 1 tablet by mouth once daily.    ibuprofen (ADVIL,MOTRIN) 200 MG tablet Take 200 mg by mouth every 6 (six) hours as needed for Pain.    levalbuterol (XOPENEX HFA) 45 mcg/actuation inhaler Inhale 1-2 puffs into the lungs every 4 (four) hours as needed for Wheezing. Rescue    lisinopriL (PRINIVIL,ZESTRIL) 40 MG tablet Take 1 tablet (40 mg total) by mouth every evening.    LORazepam (ATIVAN) 0.5 MG tablet Take one tablet approximately 30 min prior to MRI. Do not drive when taking lorazepam.    pantoprazole (PROTONIX) 40 MG tablet Take 1 tablet (40 mg total) by mouth once daily.  (Patient taking differently: Take 40 mg by mouth every evening. )    solifenacin (VESICARE) 10 MG tablet Take 1 tablet by mouth once daily (Patient taking differently: every evening. )    valACYclovir (VALTREX) 500 MG tablet Take 1 tablet (500 mg total) by mouth 2 (two) times daily. As needed for flare-up    aspirin 81 MG Chew Take 1 tablet (81 mg total) by mouth once daily.     No current facility-administered medications for this visit.        Review of patient's allergies indicates:  No Known Allergies    Family History   Problem Relation Age of Onset    Heart disease Mother     Diabetes Mother     Heart disease Father        Social History     Socioeconomic History    Marital status:      Spouse name: Danny    Number of children: 2    Years of education: 12th    Highest education level: Not on file   Occupational History    Occupation: homemaker   Social Needs    Financial resource strain: Not on file    Food insecurity     Worry: Not on file     Inability: Not on file    Transportation needs     Medical: Not on file     Non-medical: Not on file   Tobacco Use    Smoking status: Never Smoker    Smokeless tobacco: Never Used   Substance and Sexual Activity    Alcohol use: No    Drug use: No    Sexual activity: Yes     Partners: Male     Birth control/protection: Surgical   Lifestyle    Physical activity     Days per week: Not on file     Minutes per session: Not on file    Stress: Not on file   Relationships    Social connections     Talks on phone: Not on file     Gets together: Not on file     Attends Gnosticism service: Not on file     Active member of club or organization: Not on file     Attends meetings of clubs or organizations: Not on file     Relationship status: Not on file   Other Topics Concern    Are you pregnant or think you may be? No    Breast-feeding No   Social History Narrative    Not on file       History of present illness so she is 2 weeks follow-up of right  trigger thumb release.  She is doing well overall.      Review of Systems:    Musculoskeletal:  See HPI      Objective:        Physical Examination:    Vital Signs:    Vitals:    09/21/20 0841   BP: 131/75   Pulse: (!) 55       Body mass index is 39.86 kg/m².    This a well-developed, well nourished patient in no acute distress.  They are alert and oriented and cooperative to examination.        So physical exam right thumb looks good is not triggering wound looks good at the base of the thumb sutures are out.  Minimally tender as expected.  Pertinent New Results:    XRAY Report / Interpretation:       Assessment/Plan:      So of I will keep it covered work not an issue back in 4 weeks for final checkup.  Should do well    This note was created using Dragon voice recognition software that occasionally misinterpreted phrases or words.

## 2020-10-19 ENCOUNTER — OFFICE VISIT (OUTPATIENT)
Dept: ORTHOPEDICS | Facility: CLINIC | Age: 55
End: 2020-10-19
Payer: MEDICAID

## 2020-10-19 VITALS
OXYGEN SATURATION: 98 % | SYSTOLIC BLOOD PRESSURE: 110 MMHG | WEIGHT: 225 LBS | BODY MASS INDEX: 39.87 KG/M2 | HEIGHT: 63 IN | DIASTOLIC BLOOD PRESSURE: 78 MMHG | HEART RATE: 63 BPM

## 2020-10-19 DIAGNOSIS — Z98.890 STATUS POST TRIGGER FINGER RELEASE: Primary | ICD-10-CM

## 2020-10-19 PROCEDURE — 99024 POSTOP FOLLOW-UP VISIT: CPT | Mod: S$GLB,,, | Performed by: PHYSICIAN ASSISTANT

## 2020-10-19 PROCEDURE — 99024 PR POST-OP FOLLOW-UP VISIT: ICD-10-PCS | Mod: S$GLB,,, | Performed by: PHYSICIAN ASSISTANT

## 2020-10-19 NOTE — PROGRESS NOTES
[facility]  [address]  [place] [address]  Phone: [phone]   [phone]    Patient's PCP:YESENIA Marcus  Referring Provider: No ref. provider found    POST-OP Note:    Subjective:        Chief Complaint:   Chief Complaint   Patient presents with    Right Hand - Post-op Evaluation     Post op right thumb trigger finger release on 9/8/2020. Doing well, only having some stiffness/soreness.        Past Medical History:   Diagnosis Date    Allergy     Anemia 3/30/2020    Asthma     Fever blister     Hypertension        Past Surgical History:   Procedure Laterality Date    BILATERAL SALPINGOOPHORECTOMY Bilateral 5/18/2007    Torsion of ovaries and tubes    BREAST BIOPSY Right     (-)    CARDIAC CATHETERIZATION      cardiac stents  01/2017    1 stent    HYSTERECTOMY  1/13/2006    uterus and cervix due to AUB    OOPHORECTOMY      TRIGGER FINGER RELEASE Right 9/8/2020    Procedure: RELEASE, TRIGGER FINGER R-thumb;  Surgeon: Sam Bangura Jr., MD;  Location: [facility];  Service: Orthopedics;  Laterality: Right;       Current Outpatient Medications   Medication Sig    ascorbic acid (VITAMIN C) 1000 MG tablet Take 1,000 mg by mouth once daily.    atenoloL (TENORMIN) 50 MG tablet Take 1 tablet (50 mg total) by mouth once daily. (Patient taking differently: Take 50 mg by mouth every evening. )    atorvastatin (LIPITOR) 80 MG tablet Take 1 tablet (80 mg total) by mouth every evening.    calcium-vitamin D (OSCAL) 250 (625)-125 mg-unit per tablet Take 1 tablet by mouth once daily.    ibuprofen (ADVIL,MOTRIN) 200 MG tablet Take 200 mg by mouth every 6 (six) hours as needed for Pain.    levalbuterol (XOPENEX HFA) 45 mcg/actuation inhaler Inhale 1-2 puffs into the lungs every 4 (four) hours as needed for Wheezing. Rescue    lisinopriL (PRINIVIL,ZESTRIL) 40 MG tablet Take 1 tablet (40 mg total) by mouth every evening.    LORazepam (ATIVAN) 0.5 MG tablet Take one tablet  approximately 30 min prior to MRI. Do not drive when taking lorazepam.    pantoprazole (PROTONIX) 40 MG tablet Take 1 tablet (40 mg total) by mouth once daily. (Patient taking differently: Take 40 mg by mouth every evening. )    solifenacin (VESICARE) 10 MG tablet Take 1 tablet by mouth once daily    valACYclovir (VALTREX) 500 MG tablet Take 1 tablet (500 mg total) by mouth 2 (two) times daily. As needed for flare-up    aspirin 81 MG Chew Take 1 tablet (81 mg total) by mouth once daily.     No current facility-administered medications for this visit.        Review of patient's allergies indicates:  No Known Allergies    Family History   Problem Relation Age of Onset    Heart disease Mother     Diabetes Mother     Heart disease Father        Social History     Socioeconomic History    Marital status:      Spouse name: Danny    Number of children: 2    Years of education: 12th    Highest education level: Not on file   Occupational History    Occupation: homemaker   Social Needs    Financial resource strain: Not on file    Food insecurity     Worry: Not on file     Inability: Not on file    Transportation needs     Medical: Not on file     Non-medical: Not on file   Tobacco Use    Smoking status: Never Smoker    Smokeless tobacco: Never Used   Substance and Sexual Activity    Alcohol use: No    Drug use: No    Sexual activity: Yes     Partners: Male     Birth control/protection: Surgical   Lifestyle    Physical activity     Days per week: Not on file     Minutes per session: Not on file    Stress: Not on file   Relationships    Social connections     Talks on phone: Not on file     Gets together: Not on file     Attends Hoahaoism service: Not on file     Active member of club or organization: Not on file     Attends meetings of clubs or organizations: Not on file     Relationship status: Not on file   Other Topics Concern    Are you pregnant or think you may be? No    Breast-feeding No    Social History Narrative    Not on file       History of present illness:  5 weeks status post right thumb trigger finger release.      Review of Systems:    Musculoskeletal:  See HPI       Objective:        Physical Examination:    Vital Signs:   Vitals:    10/19/20 0956   BP: 110/78   Pulse: 63       Body mass index is 39.86 kg/m².    This a well-developed, well nourished patient in no acute distress.  They are alert and oriented and cooperative to examination.        Examination of the right thumb, well-healed surgical incision.  Mild tenderness over the distal metacarpal.  No evidence of wound failure dehiscence or infection.  Good range of motion.    Pertinent New Results:     XRAY Report / Interpretation:        Assessment:       1. Status post trigger finger release      Plan:     Status post trigger finger release        Follow up if symptoms worsen or fail to improve.    Stable status post trigger finger release of the right thumb.  May resume normal use of the right hand.  Mild pain and discomfort she is having I think will resolve with more aggressive use of the right hand.  Patient is right-handed.  Will see her back pgt    [unfilled]  CLINTON Cruz PA-C    This note was created using [employer] voice recognition software that occasionally misinterprets words or phrases.

## 2021-02-11 ENCOUNTER — TELEPHONE (OUTPATIENT)
Dept: FAMILY MEDICINE | Facility: CLINIC | Age: 56
End: 2021-02-11

## 2021-02-12 ENCOUNTER — HOSPITAL ENCOUNTER (EMERGENCY)
Facility: HOSPITAL | Age: 56
Discharge: HOME | End: 2021-02-12
Attending: EMERGENCY MEDICINE
Payer: MEDICAID

## 2021-02-12 ENCOUNTER — PATIENT MESSAGE (OUTPATIENT)
Dept: ADMINISTRATIVE | Facility: OTHER | Age: 56
End: 2021-02-12

## 2021-02-12 VITALS
BODY MASS INDEX: 40.48 KG/M2 | HEIGHT: 62 IN | HEART RATE: 62 BPM | SYSTOLIC BLOOD PRESSURE: 160 MMHG | OXYGEN SATURATION: 98 % | DIASTOLIC BLOOD PRESSURE: 79 MMHG | WEIGHT: 220 LBS | RESPIRATION RATE: 18 BRPM | TEMPERATURE: 99 F

## 2021-02-12 DIAGNOSIS — U07.1 COVID-19: Primary | ICD-10-CM

## 2021-02-12 DIAGNOSIS — R05.9 COUGH: ICD-10-CM

## 2021-02-12 LAB — SARS-COV-2 RDRP RESP QL NAA+PROBE: POSITIVE

## 2021-02-12 PROCEDURE — U0002 COVID-19 LAB TEST NON-CDC: HCPCS

## 2021-02-12 PROCEDURE — 99284 EMERGENCY DEPT VISIT MOD MDM: CPT | Mod: 25

## 2021-02-12 RX ORDER — FLUTICASONE PROPIONATE 50 MCG
1 SPRAY, SUSPENSION (ML) NASAL 2 TIMES DAILY PRN
Qty: 15 G | Refills: 0 | Status: SHIPPED | OUTPATIENT
Start: 2021-02-12 | End: 2021-09-14

## 2021-02-13 ENCOUNTER — TELEPHONE (OUTPATIENT)
Dept: ADMINISTRATIVE | Facility: CLINIC | Age: 56
End: 2021-02-13

## 2021-02-13 ENCOUNTER — PATIENT MESSAGE (OUTPATIENT)
Dept: ADMINISTRATIVE | Facility: OTHER | Age: 56
End: 2021-02-13

## 2021-02-13 ENCOUNTER — NURSE TRIAGE (OUTPATIENT)
Dept: ADMINISTRATIVE | Facility: CLINIC | Age: 56
End: 2021-02-13

## 2021-02-14 ENCOUNTER — PATIENT MESSAGE (OUTPATIENT)
Dept: ADMINISTRATIVE | Facility: OTHER | Age: 56
End: 2021-02-14

## 2021-02-15 ENCOUNTER — PATIENT MESSAGE (OUTPATIENT)
Dept: ADMINISTRATIVE | Facility: OTHER | Age: 56
End: 2021-02-15

## 2021-02-15 ENCOUNTER — INFUSION (OUTPATIENT)
Dept: INFECTIOUS DISEASES | Facility: HOSPITAL | Age: 56
End: 2021-02-15
Attending: NURSE PRACTITIONER
Payer: MEDICAID

## 2021-02-15 VITALS
OXYGEN SATURATION: 97 % | TEMPERATURE: 98 F | HEART RATE: 53 BPM | SYSTOLIC BLOOD PRESSURE: 151 MMHG | HEIGHT: 62 IN | BODY MASS INDEX: 40.48 KG/M2 | WEIGHT: 220 LBS | DIASTOLIC BLOOD PRESSURE: 71 MMHG | RESPIRATION RATE: 18 BRPM

## 2021-02-15 DIAGNOSIS — U07.1 COVID-19: ICD-10-CM

## 2021-02-15 PROCEDURE — 25000003 PHARM REV CODE 250: Performed by: NURSE PRACTITIONER

## 2021-02-15 PROCEDURE — M0243 CASIRIVI AND IMDEVI INFUSION: HCPCS | Performed by: NURSE PRACTITIONER

## 2021-02-15 PROCEDURE — 63600175 PHARM REV CODE 636 W HCPCS: Performed by: NURSE PRACTITIONER

## 2021-02-15 RX ORDER — DIPHENHYDRAMINE HYDROCHLORIDE 50 MG/ML
25 INJECTION INTRAMUSCULAR; INTRAVENOUS ONCE AS NEEDED
Status: DISCONTINUED | OUTPATIENT
Start: 2021-02-15 | End: 2021-10-22 | Stop reason: ALTCHOICE

## 2021-02-15 RX ORDER — ACETAMINOPHEN 325 MG/1
650 TABLET ORAL ONCE AS NEEDED
Status: DISCONTINUED | OUTPATIENT
Start: 2021-02-15 | End: 2021-10-22 | Stop reason: ALTCHOICE

## 2021-02-15 RX ORDER — ONDANSETRON 4 MG/1
4 TABLET, ORALLY DISINTEGRATING ORAL ONCE AS NEEDED
Status: DISCONTINUED | OUTPATIENT
Start: 2021-02-15 | End: 2021-10-22 | Stop reason: ALTCHOICE

## 2021-02-15 RX ORDER — ALBUTEROL SULFATE 90 UG/1
2 AEROSOL, METERED RESPIRATORY (INHALATION)
Status: DISCONTINUED | OUTPATIENT
Start: 2021-02-15 | End: 2021-10-22 | Stop reason: ALTCHOICE

## 2021-02-15 RX ORDER — EPINEPHRINE 0.1 MG/ML
0.3 INJECTION INTRAVENOUS
Status: DISCONTINUED | OUTPATIENT
Start: 2021-02-15 | End: 2021-10-22 | Stop reason: ALTCHOICE

## 2021-02-15 RX ORDER — SODIUM CHLORIDE 0.9 % (FLUSH) 0.9 %
10 SYRINGE (ML) INJECTION
Status: DISCONTINUED | OUTPATIENT
Start: 2021-02-15 | End: 2021-10-22 | Stop reason: ALTCHOICE

## 2021-02-15 RX ADMIN — CASIRIVIMAB: 1332 INJECTION, SOLUTION, CONCENTRATE INTRAVENOUS at 09:02

## 2021-02-15 RX ADMIN — SODIUM CHLORIDE: 0.9 INJECTION, SOLUTION INTRAVENOUS at 09:02

## 2021-02-16 ENCOUNTER — PATIENT MESSAGE (OUTPATIENT)
Dept: ADMINISTRATIVE | Facility: OTHER | Age: 56
End: 2021-02-16

## 2021-02-17 ENCOUNTER — PATIENT MESSAGE (OUTPATIENT)
Dept: ADMINISTRATIVE | Facility: OTHER | Age: 56
End: 2021-02-17

## 2021-02-18 ENCOUNTER — PATIENT MESSAGE (OUTPATIENT)
Dept: ADMINISTRATIVE | Facility: OTHER | Age: 56
End: 2021-02-18

## 2021-02-19 ENCOUNTER — PATIENT MESSAGE (OUTPATIENT)
Dept: ADMINISTRATIVE | Facility: OTHER | Age: 56
End: 2021-02-19

## 2021-02-20 ENCOUNTER — PATIENT MESSAGE (OUTPATIENT)
Dept: ADMINISTRATIVE | Facility: OTHER | Age: 56
End: 2021-02-20

## 2021-02-21 ENCOUNTER — PATIENT MESSAGE (OUTPATIENT)
Dept: ADMINISTRATIVE | Facility: OTHER | Age: 56
End: 2021-02-21

## 2021-02-22 ENCOUNTER — NURSE TRIAGE (OUTPATIENT)
Dept: ADMINISTRATIVE | Facility: CLINIC | Age: 56
End: 2021-02-22

## 2021-02-22 ENCOUNTER — PATIENT MESSAGE (OUTPATIENT)
Dept: ADMINISTRATIVE | Facility: OTHER | Age: 56
End: 2021-02-22

## 2021-02-23 ENCOUNTER — PATIENT MESSAGE (OUTPATIENT)
Dept: ADMINISTRATIVE | Facility: OTHER | Age: 56
End: 2021-02-23

## 2021-02-23 ENCOUNTER — NURSE TRIAGE (OUTPATIENT)
Dept: ADMINISTRATIVE | Facility: CLINIC | Age: 56
End: 2021-02-23

## 2021-02-23 ENCOUNTER — PATIENT MESSAGE (OUTPATIENT)
Dept: ADMINISTRATIVE | Facility: CLINIC | Age: 56
End: 2021-02-23

## 2021-02-24 ENCOUNTER — PATIENT MESSAGE (OUTPATIENT)
Dept: ADMINISTRATIVE | Facility: OTHER | Age: 56
End: 2021-02-24

## 2021-02-25 ENCOUNTER — PATIENT MESSAGE (OUTPATIENT)
Dept: ADMINISTRATIVE | Facility: OTHER | Age: 56
End: 2021-02-25

## 2021-02-26 ENCOUNTER — PATIENT MESSAGE (OUTPATIENT)
Dept: ADMINISTRATIVE | Facility: OTHER | Age: 56
End: 2021-02-26

## 2021-03-11 ENCOUNTER — PATIENT OUTREACH (OUTPATIENT)
Dept: INFECTIOUS DISEASES | Facility: HOSPITAL | Age: 56
End: 2021-03-11

## 2021-04-21 PROBLEM — M79.605 PAIN IN BOTH LOWER EXTREMITIES: Status: ACTIVE | Noted: 2021-04-21

## 2021-04-21 PROBLEM — M79.604 PAIN IN BOTH LOWER EXTREMITIES: Status: ACTIVE | Noted: 2021-04-21

## 2021-04-21 PROBLEM — Z86.39 H/O HYPERGLYCEMIA: Status: ACTIVE | Noted: 2021-04-21

## 2021-05-04 ENCOUNTER — PATIENT MESSAGE (OUTPATIENT)
Dept: RESEARCH | Facility: HOSPITAL | Age: 56
End: 2021-05-04

## 2021-09-14 ENCOUNTER — OFFICE VISIT (OUTPATIENT)
Dept: FAMILY MEDICINE | Facility: CLINIC | Age: 56
End: 2021-09-14
Payer: MEDICAID

## 2021-09-14 ENCOUNTER — LAB VISIT (OUTPATIENT)
Dept: LAB | Facility: HOSPITAL | Age: 56
End: 2021-09-14
Attending: NURSE PRACTITIONER
Payer: MEDICAID

## 2021-09-14 VITALS
TEMPERATURE: 99 F | OXYGEN SATURATION: 98 % | HEART RATE: 76 BPM | DIASTOLIC BLOOD PRESSURE: 82 MMHG | BODY MASS INDEX: 40.12 KG/M2 | SYSTOLIC BLOOD PRESSURE: 136 MMHG | HEIGHT: 62 IN | WEIGHT: 218 LBS

## 2021-09-14 DIAGNOSIS — E66.01 CLASS 2 SEVERE OBESITY DUE TO EXCESS CALORIES WITH SERIOUS COMORBIDITY AND BODY MASS INDEX (BMI) OF 39.0 TO 39.9 IN ADULT: ICD-10-CM

## 2021-09-14 DIAGNOSIS — R11.0 NAUSEA: ICD-10-CM

## 2021-09-14 DIAGNOSIS — N39.41 URGE INCONTINENCE OF URINE: ICD-10-CM

## 2021-09-14 DIAGNOSIS — R10.9 RIGHT FLANK PAIN: ICD-10-CM

## 2021-09-14 DIAGNOSIS — R10.31 ABDOMINAL PAIN, RLQ: Primary | ICD-10-CM

## 2021-09-14 DIAGNOSIS — K21.9 GASTROESOPHAGEAL REFLUX DISEASE WITHOUT ESOPHAGITIS: ICD-10-CM

## 2021-09-14 DIAGNOSIS — R10.31 RIGHT LOWER QUADRANT PAIN: ICD-10-CM

## 2021-09-14 DIAGNOSIS — J45.20 INTERMITTENT ASTHMA WITHOUT COMPLICATION, UNSPECIFIED ASTHMA SEVERITY: ICD-10-CM

## 2021-09-14 DIAGNOSIS — R10.31 ABDOMINAL PAIN, RLQ: ICD-10-CM

## 2021-09-14 LAB
ALBUMIN SERPL BCP-MCNC: 4.1 G/DL (ref 3.5–5.2)
ALP SERPL-CCNC: 90 U/L (ref 55–135)
ALT SERPL W/O P-5'-P-CCNC: 26 U/L (ref 10–44)
ANION GAP SERPL CALC-SCNC: 10 MMOL/L (ref 8–16)
AST SERPL-CCNC: 16 U/L (ref 10–40)
BACTERIA #/AREA URNS HPF: NEGATIVE /HPF
BASOPHILS # BLD AUTO: 0.01 K/UL (ref 0–0.2)
BASOPHILS NFR BLD: 0.1 % (ref 0–1.9)
BILIRUB SERPL-MCNC: 0.7 MG/DL (ref 0.1–1)
BILIRUB UR QL STRIP: NEGATIVE
BUN SERPL-MCNC: 12 MG/DL (ref 6–20)
CALCIUM SERPL-MCNC: 8.8 MG/DL (ref 8.7–10.5)
CHLORIDE SERPL-SCNC: 104 MMOL/L (ref 95–110)
CLARITY UR: CLEAR
CO2 SERPL-SCNC: 27 MMOL/L (ref 23–29)
COLOR UR: YELLOW
CREAT SERPL-MCNC: 0.7 MG/DL (ref 0.5–1.4)
DIFFERENTIAL METHOD: ABNORMAL
EOSINOPHIL # BLD AUTO: 0.1 K/UL (ref 0–0.5)
EOSINOPHIL NFR BLD: 1.3 % (ref 0–8)
ERYTHROCYTE [DISTWIDTH] IN BLOOD BY AUTOMATED COUNT: 17 % (ref 11.5–14.5)
EST. GFR  (AFRICAN AMERICAN): >60 ML/MIN/1.73 M^2
EST. GFR  (NON AFRICAN AMERICAN): >60 ML/MIN/1.73 M^2
GLUCOSE SERPL-MCNC: 102 MG/DL (ref 70–110)
GLUCOSE UR QL STRIP: NEGATIVE
HCT VFR BLD AUTO: 41.4 % (ref 37–48.5)
HGB BLD-MCNC: 12.2 G/DL (ref 12–16)
HGB UR QL STRIP: NEGATIVE
HYALINE CASTS #/AREA URNS LPF: 4 /LPF
IMM GRANULOCYTES # BLD AUTO: 0.02 K/UL (ref 0–0.04)
IMM GRANULOCYTES NFR BLD AUTO: 0.3 % (ref 0–0.5)
KETONES UR QL STRIP: NEGATIVE
LEUKOCYTE ESTERASE UR QL STRIP: ABNORMAL
LYMPHOCYTES # BLD AUTO: 1.3 K/UL (ref 1–4.8)
LYMPHOCYTES NFR BLD: 17.1 % (ref 18–48)
MCH RBC QN AUTO: 19.8 PG (ref 27–31)
MCHC RBC AUTO-ENTMCNC: 29.5 G/DL (ref 32–36)
MCV RBC AUTO: 67 FL (ref 82–98)
MICROSCOPIC COMMENT: ABNORMAL
MONOCYTES # BLD AUTO: 0.4 K/UL (ref 0.3–1)
MONOCYTES NFR BLD: 4.6 % (ref 4–15)
NEUTROPHILS # BLD AUTO: 6 K/UL (ref 1.8–7.7)
NEUTROPHILS NFR BLD: 76.6 % (ref 38–73)
NITRITE UR QL STRIP: NEGATIVE
NRBC BLD-RTO: 0 /100 WBC
PH UR STRIP: 7 [PH] (ref 5–8)
PLATELET # BLD AUTO: 305 K/UL (ref 150–450)
PMV BLD AUTO: 10.5 FL (ref 9.2–12.9)
POTASSIUM SERPL-SCNC: 4.6 MMOL/L (ref 3.5–5.1)
PROT SERPL-MCNC: 7.1 G/DL (ref 6–8.4)
PROT UR QL STRIP: ABNORMAL
RBC # BLD AUTO: 6.16 M/UL (ref 4–5.4)
RBC #/AREA URNS HPF: 5 /HPF (ref 0–4)
SODIUM SERPL-SCNC: 141 MMOL/L (ref 136–145)
SP GR UR STRIP: 1.02 (ref 1–1.03)
SQUAMOUS #/AREA URNS HPF: 4 /HPF
URN SPEC COLLECT METH UR: ABNORMAL
UROBILINOGEN UR STRIP-ACNC: NEGATIVE EU/DL
WBC # BLD AUTO: 7.82 K/UL (ref 3.9–12.7)
WBC #/AREA URNS HPF: 3 /HPF (ref 0–5)

## 2021-09-14 PROCEDURE — 81001 URINALYSIS AUTO W/SCOPE: CPT | Performed by: NURSE PRACTITIONER

## 2021-09-14 PROCEDURE — 85025 COMPLETE CBC W/AUTO DIFF WBC: CPT | Performed by: NURSE PRACTITIONER

## 2021-09-14 PROCEDURE — 99214 OFFICE O/P EST MOD 30 MIN: CPT | Mod: S$PBB,,, | Performed by: NURSE PRACTITIONER

## 2021-09-14 PROCEDURE — 80053 COMPREHEN METABOLIC PANEL: CPT | Performed by: NURSE PRACTITIONER

## 2021-09-14 PROCEDURE — 36415 COLL VENOUS BLD VENIPUNCTURE: CPT | Performed by: NURSE PRACTITIONER

## 2021-09-14 PROCEDURE — 99214 PR OFFICE/OUTPT VISIT, EST, LEVL IV, 30-39 MIN: ICD-10-PCS | Mod: S$PBB,,, | Performed by: NURSE PRACTITIONER

## 2021-09-14 PROCEDURE — 99215 OFFICE O/P EST HI 40 MIN: CPT | Performed by: NURSE PRACTITIONER

## 2021-09-14 RX ORDER — LEVALBUTEROL TARTRATE 45 UG/1
1-2 AEROSOL, METERED ORAL EVERY 4 HOURS PRN
Qty: 1 INHALER | Refills: 5 | Status: SHIPPED | OUTPATIENT
Start: 2021-09-14 | End: 2022-01-06

## 2021-09-14 RX ORDER — PANTOPRAZOLE SODIUM 40 MG/1
40 TABLET, DELAYED RELEASE ORAL DAILY
Qty: 90 TABLET | Refills: 1 | Status: SHIPPED | OUTPATIENT
Start: 2021-09-14 | End: 2022-04-29

## 2021-09-14 RX ORDER — SOLIFENACIN SUCCINATE 10 MG/1
10 TABLET, FILM COATED ORAL DAILY
Qty: 90 TABLET | Refills: 1 | Status: SHIPPED | OUTPATIENT
Start: 2021-09-14 | End: 2022-03-14

## 2021-09-24 ENCOUNTER — HOSPITAL ENCOUNTER (OUTPATIENT)
Dept: RADIOLOGY | Facility: HOSPITAL | Age: 56
Discharge: HOME | End: 2021-09-24
Attending: NURSE PRACTITIONER
Payer: MEDICAID

## 2021-09-24 DIAGNOSIS — R10.31 RIGHT LOWER QUADRANT PAIN: ICD-10-CM

## 2021-09-24 PROCEDURE — 25500020 PHARM REV CODE 255: Mod: PO | Performed by: NURSE PRACTITIONER

## 2021-09-24 PROCEDURE — 74177 CT ABD & PELVIS W/CONTRAST: CPT | Mod: TC,PO

## 2021-09-24 RX ADMIN — IOHEXOL 100 ML: 350 INJECTION, SOLUTION INTRAVENOUS at 11:09

## 2021-10-04 DIAGNOSIS — B00.1 FEVER BLISTER: ICD-10-CM

## 2021-10-06 RX ORDER — VALACYCLOVIR HYDROCHLORIDE 500 MG/1
500 TABLET, FILM COATED ORAL 2 TIMES DAILY
Qty: 14 TABLET | Refills: 1 | Status: SHIPPED | OUTPATIENT
Start: 2021-10-06 | End: 2022-05-02

## 2021-10-22 ENCOUNTER — OFFICE VISIT (OUTPATIENT)
Dept: FAMILY MEDICINE | Facility: CLINIC | Age: 56
End: 2021-10-22
Payer: MEDICAID

## 2021-10-22 VITALS
DIASTOLIC BLOOD PRESSURE: 89 MMHG | SYSTOLIC BLOOD PRESSURE: 178 MMHG | WEIGHT: 220 LBS | HEART RATE: 62 BPM | TEMPERATURE: 98 F | HEIGHT: 62 IN | RESPIRATION RATE: 18 BRPM | BODY MASS INDEX: 40.48 KG/M2

## 2021-10-22 DIAGNOSIS — D56.3 BETA THALASSEMIA MINOR: ICD-10-CM

## 2021-10-22 DIAGNOSIS — J00 NASOPHARYNGITIS ACUTE: ICD-10-CM

## 2021-10-22 DIAGNOSIS — E66.01 MORBID OBESITY WITH BMI OF 40.0-44.9, ADULT: ICD-10-CM

## 2021-10-22 DIAGNOSIS — J45.21 MILD INTERMITTENT ASTHMA WITH ACUTE EXACERBATION: Primary | Chronic | ICD-10-CM

## 2021-10-22 DIAGNOSIS — R71.8 MICROCYTIC RED BLOOD CELLS: ICD-10-CM

## 2021-10-22 PROBLEM — J45.20 MILD INTERMITTENT ASTHMA WITHOUT COMPLICATION: Status: ACTIVE | Noted: 2021-10-22

## 2021-10-22 PROCEDURE — 99215 OFFICE O/P EST HI 40 MIN: CPT | Performed by: INTERNAL MEDICINE

## 2021-10-22 PROCEDURE — 99213 OFFICE O/P EST LOW 20 MIN: CPT | Mod: S$PBB,,, | Performed by: INTERNAL MEDICINE

## 2021-10-22 PROCEDURE — 99213 PR OFFICE/OUTPT VISIT, EST, LEVL III, 20-29 MIN: ICD-10-PCS | Mod: S$PBB,,, | Performed by: INTERNAL MEDICINE

## 2021-10-22 RX ORDER — AZITHROMYCIN 250 MG/1
TABLET, FILM COATED ORAL
Qty: 6 TABLET | Refills: 0 | Status: SHIPPED | OUTPATIENT
Start: 2021-10-22 | End: 2021-10-27

## 2021-10-22 RX ORDER — METHYLPREDNISOLONE 4 MG/1
TABLET ORAL
Qty: 21 EACH | Refills: 0 | Status: SHIPPED | OUTPATIENT
Start: 2021-10-22 | End: 2021-11-12

## 2022-01-06 ENCOUNTER — TELEPHONE (OUTPATIENT)
Dept: FAMILY MEDICINE | Facility: CLINIC | Age: 57
End: 2022-01-06
Payer: MEDICAID

## 2022-01-06 DIAGNOSIS — J45.21 MILD INTERMITTENT ASTHMA WITH ACUTE EXACERBATION: Primary | ICD-10-CM

## 2022-01-06 RX ORDER — ALBUTEROL SULFATE 90 UG/1
2 AEROSOL, METERED RESPIRATORY (INHALATION) EVERY 6 HOURS PRN
Qty: 18 G | Refills: 2 | Status: SHIPPED | OUTPATIENT
Start: 2022-01-06 | End: 2022-07-05 | Stop reason: SDUPTHER

## 2022-01-06 NOTE — TELEPHONE ENCOUNTER
----- Message from Valentine Sauceda MA sent at 1/6/2022 12:02 PM CST -----  Prior auth not covered

## 2022-01-06 NOTE — TELEPHONE ENCOUNTER
levalbuterol tartrate is not covered by insurance. Alternates are:    Albuterol sulfate  Ventolin

## 2022-03-29 ENCOUNTER — TELEPHONE (OUTPATIENT)
Dept: FAMILY MEDICINE | Facility: CLINIC | Age: 57
End: 2022-03-29
Payer: MEDICAID

## 2022-03-29 DIAGNOSIS — N39.41 URGE INCONTINENCE OF URINE: ICD-10-CM

## 2022-03-29 RX ORDER — SOLIFENACIN SUCCINATE 10 MG/1
10 TABLET, FILM COATED ORAL DAILY
Qty: 90 TABLET | Refills: 0 | Status: SHIPPED | OUTPATIENT
Start: 2022-03-29 | End: 2022-07-05 | Stop reason: SDUPTHER

## 2022-03-29 NOTE — TELEPHONE ENCOUNTER
Spoke to patient and she wants to know if you are going to refill this medication until her appointment in May.

## 2022-03-29 NOTE — TELEPHONE ENCOUNTER
----- Message from Jeanie Quiroga sent at 3/29/2022  9:31 AM CDT -----  Patient made an appointment for MayHolli's soonest appointment, but needs her bladder medication called in before that time.

## 2022-04-06 ENCOUNTER — OFFICE VISIT (OUTPATIENT)
Dept: FAMILY MEDICINE | Facility: CLINIC | Age: 57
End: 2022-04-06
Payer: MEDICAID

## 2022-04-06 VITALS
TEMPERATURE: 98 F | WEIGHT: 221 LBS | HEIGHT: 62 IN | BODY MASS INDEX: 40.67 KG/M2 | SYSTOLIC BLOOD PRESSURE: 136 MMHG | DIASTOLIC BLOOD PRESSURE: 82 MMHG | HEART RATE: 68 BPM | OXYGEN SATURATION: 98 %

## 2022-04-06 DIAGNOSIS — J34.89 SINUS PAIN: ICD-10-CM

## 2022-04-06 DIAGNOSIS — E66.01 CLASS 3 SEVERE OBESITY DUE TO EXCESS CALORIES WITH SERIOUS COMORBIDITY AND BODY MASS INDEX (BMI) OF 40.0 TO 44.9 IN ADULT: ICD-10-CM

## 2022-04-06 DIAGNOSIS — R05.9 COUGH: ICD-10-CM

## 2022-04-06 DIAGNOSIS — J01.00 ACUTE MAXILLARY SINUSITIS, RECURRENCE NOT SPECIFIED: ICD-10-CM

## 2022-04-06 DIAGNOSIS — J02.9 SORE THROAT: Primary | ICD-10-CM

## 2022-04-06 LAB
CTP QC/QA: YES
CTP QC/QA: YES
MOLECULAR STREP A: NEGATIVE
POC MOLECULAR INFLUENZA A AGN: NEGATIVE
POC MOLECULAR INFLUENZA B AGN: NEGATIVE

## 2022-04-06 PROCEDURE — 3079F PR MOST RECENT DIASTOLIC BLOOD PRESSURE 80-89 MM HG: ICD-10-PCS | Mod: ,,, | Performed by: NURSE PRACTITIONER

## 2022-04-06 PROCEDURE — 3008F PR BODY MASS INDEX (BMI) DOCUMENTED: ICD-10-PCS | Mod: ,,, | Performed by: NURSE PRACTITIONER

## 2022-04-06 PROCEDURE — 99214 PR OFFICE/OUTPT VISIT, EST, LEVL IV, 30-39 MIN: ICD-10-PCS | Mod: S$PBB,,, | Performed by: NURSE PRACTITIONER

## 2022-04-06 PROCEDURE — 99214 OFFICE O/P EST MOD 30 MIN: CPT | Mod: S$PBB,,, | Performed by: NURSE PRACTITIONER

## 2022-04-06 PROCEDURE — 87502 INFLUENZA DNA AMP PROBE: CPT | Mod: PBBFAC | Performed by: NURSE PRACTITIONER

## 2022-04-06 PROCEDURE — 1160F PR REVIEW ALL MEDS BY PRESCRIBER/CLIN PHARMACIST DOCUMENTED: ICD-10-PCS | Mod: ,,, | Performed by: NURSE PRACTITIONER

## 2022-04-06 PROCEDURE — 3079F DIAST BP 80-89 MM HG: CPT | Mod: ,,, | Performed by: NURSE PRACTITIONER

## 2022-04-06 PROCEDURE — 3075F PR MOST RECENT SYSTOLIC BLOOD PRESS GE 130-139MM HG: ICD-10-PCS | Mod: ,,, | Performed by: NURSE PRACTITIONER

## 2022-04-06 PROCEDURE — 99215 OFFICE O/P EST HI 40 MIN: CPT | Performed by: NURSE PRACTITIONER

## 2022-04-06 PROCEDURE — 1160F RVW MEDS BY RX/DR IN RCRD: CPT | Mod: ,,, | Performed by: NURSE PRACTITIONER

## 2022-04-06 PROCEDURE — 4010F ACE/ARB THERAPY RXD/TAKEN: CPT | Mod: ,,, | Performed by: NURSE PRACTITIONER

## 2022-04-06 PROCEDURE — 3008F BODY MASS INDEX DOCD: CPT | Mod: ,,, | Performed by: NURSE PRACTITIONER

## 2022-04-06 PROCEDURE — 3075F SYST BP GE 130 - 139MM HG: CPT | Mod: ,,, | Performed by: NURSE PRACTITIONER

## 2022-04-06 PROCEDURE — 4010F PR ACE/ARB THEARPY RXD/TAKEN: ICD-10-PCS | Mod: ,,, | Performed by: NURSE PRACTITIONER

## 2022-04-06 PROCEDURE — 87651 STREP A DNA AMP PROBE: CPT | Mod: PBBFAC | Performed by: NURSE PRACTITIONER

## 2022-04-06 RX ORDER — FLUTICASONE PROPIONATE 50 MCG
1 SPRAY, SUSPENSION (ML) NASAL 2 TIMES DAILY
Qty: 16 G | Refills: 1 | Status: SHIPPED | OUTPATIENT
Start: 2022-04-06 | End: 2022-07-05

## 2022-04-06 RX ORDER — AMOXICILLIN AND CLAVULANATE POTASSIUM 875; 125 MG/1; MG/1
1 TABLET, FILM COATED ORAL 2 TIMES DAILY
Qty: 20 TABLET | Refills: 0 | Status: SHIPPED | OUTPATIENT
Start: 2022-04-06 | End: 2022-07-05

## 2022-04-06 RX ORDER — PROMETHAZINE HYDROCHLORIDE AND DEXTROMETHORPHAN HYDROBROMIDE 6.25; 15 MG/5ML; MG/5ML
5 SYRUP ORAL EVERY 6 HOURS PRN
Qty: 118 ML | Refills: 0 | Status: SHIPPED | OUTPATIENT
Start: 2022-04-06 | End: 2022-04-11

## 2022-04-06 NOTE — PROGRESS NOTES
Subjective:       Patient ID: Zoila Moreland is a 56 y.o. female.    Chief Complaint: Sore Throat and Cough    Sore Throat   This is a new problem. The current episode started more than 1 month ago. The problem has been waxing and waning. There has been no fever. The pain is mild. Associated symptoms include congestion, coughing, headaches and a plugged ear sensation. Pertinent negatives include no abdominal pain, diarrhea, drooling, ear discharge, ear pain, hoarse voice, neck pain, shortness of breath, stridor, swollen glands, trouble swallowing or vomiting. She has had no exposure to strep or mono. She has tried cool liquids and acetaminophen for the symptoms. The treatment provided mild relief.   Cough  This is a new problem. The current episode started in the past 7 days. The problem has been waxing and waning. The problem occurs constantly. The cough is productive of sputum. Associated symptoms include ear congestion, headaches, nasal congestion, postnasal drip, rhinorrhea and a sore throat. Pertinent negatives include no chest pain, chills, ear pain, fever, rash, shortness of breath, sweats, weight loss or wheezing. The symptoms are aggravated by lying down. She has tried rest (mucinex dm) for the symptoms. The treatment provided mild relief. There is no history of asthma or bronchiectasis.   Sinus Problem  This is a new problem. The current episode started 1 to 4 weeks ago. The problem has been gradually worsening since onset. There has been no fever. The pain is moderate. Associated symptoms include congestion, coughing, headaches, sinus pressure, sneezing and a sore throat. Pertinent negatives include no chills, diaphoresis, ear pain, hoarse voice, neck pain, shortness of breath or swollen glands. Past treatments include lying down and sitting up (mucinex dm). The treatment provided moderate relief.     Review of Systems   Constitutional: Positive for activity change. Negative for appetite change,  chills, diaphoresis, fever and weight loss.        Obesity   HENT: Positive for congestion, postnasal drip, rhinorrhea, sinus pressure, sinus pain, sneezing and sore throat. Negative for drooling, ear discharge, ear pain, hoarse voice, trouble swallowing and voice change.    Eyes: Negative for photophobia, pain, discharge and visual disturbance.   Respiratory: Positive for cough. Negative for chest tightness, shortness of breath, wheezing and stridor.    Cardiovascular: Negative for chest pain and palpitations.   Gastrointestinal: Negative for abdominal pain, diarrhea, nausea and vomiting.   Endocrine: Negative for cold intolerance and heat intolerance.   Genitourinary: Negative for difficulty urinating and dysuria.   Musculoskeletal: Negative for arthralgias, gait problem and neck pain.   Skin: Negative for rash.   Allergic/Immunologic: Negative for immunocompromised state.   Neurological: Positive for headaches. Negative for speech difficulty.   Psychiatric/Behavioral: Negative for confusion, self-injury and suicidal ideas.       Past Medical History:   Diagnosis Date    Allergy     Anemia 3/30/2020    Asthma     Fever blister     Hypertension       Past Surgical History:   Procedure Laterality Date    BILATERAL SALPINGOOPHORECTOMY Bilateral 5/18/2007    Torsion of ovaries and tubes    BREAST BIOPSY Right     (-)    CARDIAC CATHETERIZATION      cardiac stents  01/2017    1 stent    HYSTERECTOMY  1/13/2006    uterus and cervix due to AUB    OOPHORECTOMY      TRIGGER FINGER RELEASE Right 9/8/2020    Procedure: RELEASE, TRIGGER FINGER R-thumb;  Surgeon: Sam Bangura Jr., MD;  Location: [facility];  Service: Orthopedics;  Laterality: Right;       Family History   Problem Relation Age of Onset    Heart disease Mother     Diabetes Mother     Heart disease Father        Social History     Socioeconomic History    Marital status:      Spouse name: Danny    Number of children: 2    Years of  education: 12th   Occupational History    Occupation: homemaker   Tobacco Use    Smoking status: Never Smoker    Smokeless tobacco: Never Used   Substance and Sexual Activity    Alcohol use: No    Drug use: No    Sexual activity: Yes     Partners: Male     Birth control/protection: Surgical   Other Topics Concern    Are you pregnant or think you may be? No    Breast-feeding No       Current Outpatient Medications   Medication Sig Dispense Refill    albuterol (VENTOLIN HFA) 90 mcg/actuation inhaler Inhale 2 puffs into the lungs every 6 (six) hours as needed for Wheezing. Rescue 18 g 2    ascorbic acid, vitamin C, (VITAMIN C) 1000 MG tablet Take 1,000 mg by mouth once daily.      aspirin 81 MG Chew Take 1 tablet (81 mg total) by mouth once daily. 1 tablet 0    atenoloL (TENORMIN) 50 MG tablet Take 1 tablet by mouth once daily 90 tablet 0    atorvastatin (LIPITOR) 80 MG tablet Take 1 tablet by mouth in the evening 90 tablet 0    calcium-vitamin D (OSCAL) 250 (625)-125 mg-unit per tablet Take 1 tablet by mouth once daily.      ibuprofen (ADVIL,MOTRIN) 200 MG tablet Take 200 mg by mouth every 6 (six) hours as needed for Pain.      lisinopriL (PRINIVIL,ZESTRIL) 40 MG tablet TAKE 1 TABLET BY MOUTH ONCE DAILY IN THE EVENING 90 tablet 0    pantoprazole (PROTONIX) 40 MG tablet Take 1 tablet (40 mg total) by mouth once daily. 90 tablet 1    pulse oximeter (PULSE OXIMETER) device Use twice daily at 8 AM and 3 PM and [facility] the [facility]. 1 each 0    solifenacin (VESICARE) 10 MG tablet Take 1 tablet (10 mg total) by mouth once daily. 90 tablet 0    valACYclovir (VALTREX) 500 MG tablet Take 1 tablet (500 mg total) by mouth 2 (two) times daily. As needed for flare-up 14 tablet 1    amoxicillin-clavulanate 875-125mg (AUGMENTIN) 875-125 mg per tablet Take 1 tablet by mouth 2 (two) times daily. 20 tablet 0    fluticasone propionate (FLONASE) 50 mcg/actuation nasal spray 1 spray (50 mcg total)  "by Each Nostril route 2 (two) times daily. 16 g 1    promethazine-dextromethorphan (PROMETHAZINE-DM) 6.25-15 mg/5 mL Syrp Take 5 mLs by mouth every 6 (six) hours as needed (cough). 118 mL 0     No current facility-administered medications for this visit.       Review of patient's allergies indicates:  No Known Allergies  Objective:      Blood pressure 136/82, pulse 68, temperature 98.1 °F (36.7 °C), height 5' 2" (1.575 m), weight 100.2 kg (221 lb), SpO2 98 %. Body mass index is 40.42 kg/m².   Physical Exam  Vitals and nursing note reviewed.   Constitutional:       General: She is not in acute distress.     Appearance: Normal appearance. She is well-developed.   HENT:      Head: Normocephalic and atraumatic.      Right Ear: External ear normal. A middle ear effusion is present. Tympanic membrane is bulging.      Left Ear: External ear normal. A middle ear effusion is present. Tympanic membrane is not retracted.      Nose: Mucosal edema and rhinorrhea present. Rhinorrhea is purulent.      Right Sinus: Maxillary sinus tenderness and frontal sinus tenderness present.      Left Sinus: Maxillary sinus tenderness and frontal sinus tenderness present.      Mouth/Throat:      Mouth: Mucous membranes are moist.      Pharynx: Uvula midline. Oropharyngeal exudate and posterior oropharyngeal erythema present.   Eyes:      General: Lids are normal. Lids are everted, no foreign bodies appreciated.      Conjunctiva/sclera: Conjunctivae normal.      Pupils: Pupils are equal, round, and reactive to light.      Right eye: Pupil is round and reactive.      Left eye: Pupil is round and reactive.   Neck:      Trachea: Trachea normal.   Cardiovascular:      Rate and Rhythm: Normal rate and regular rhythm.      Pulses: Normal pulses.      Heart sounds: Normal heart sounds, S1 normal and S2 normal.   Pulmonary:      Effort: Pulmonary effort is normal.      Breath sounds: Normal breath sounds. No stridor, decreased air movement or " transmitted upper airway sounds. No decreased breath sounds or wheezing.   Abdominal:      General: Abdomen is flat. Bowel sounds are normal.      Palpations: Abdomen is soft. Abdomen is not rigid.      Tenderness: There is no guarding.   Musculoskeletal:         General: Normal range of motion.      Cervical back: Normal range of motion and neck supple. No muscular tenderness.   Lymphadenopathy:      Cervical: Cervical adenopathy present.      Right cervical: Superficial cervical adenopathy present.      Left cervical: Superficial cervical adenopathy present.   Skin:     General: Skin is warm and dry.      Capillary Refill: Capillary refill takes less than 2 seconds.   Neurological:      General: No focal deficit present.      Mental Status: She is alert and oriented to person, place, and time.   Psychiatric:         Mood and Affect: Mood normal.         Behavior: Behavior normal. Behavior is cooperative.         Thought Content: Thought content normal.         Judgment: Judgment normal.             Assessment:       1. Sore throat    2. Acute maxillary sinusitis, recurrence not specified    3. Cough    4. Sinus pain    5. Class 3 severe obesity due to excess calories with serious comorbidity and body mass index (BMI) of 40.0 to 44.9 in adult        Plan:       Zoila was seen today for sore throat and cough.    Diagnoses and all orders for this visit:    Sore throat  -     POCT Strep A, Molecular (Negative)  -     POCT Influenza A/B Molecular (Negative)    Acute maxillary sinusitis, recurrence not specified  -     amoxicillin-clavulanate 875-125mg (AUGMENTIN) 875-125 mg per tablet; Take 1 tablet by mouth 2 (two) times daily.  -     fluticasone propionate (FLONASE) 50 mcg/actuation nasal spray; 1 spray (50 mcg total) by Each Nostril route 2 (two) times daily.    Cough  -     promethazine-dextromethorphan (PROMETHAZINE-DM) 6.25-15 mg/5 mL Syrp; Take 5 mLs by mouth every 6 (six) hours as needed (cough).    Sinus  pain  Tylenol and ibuprofen alternating.    Class 3 severe obesity due to excess calories with serious comorbidity and body mass index (BMI) of 40.0 to 44.9 in adult  The patient is asked to make an attempt to improve diet and exercise patterns to aid in medical management of this problem.

## 2022-07-05 ENCOUNTER — OFFICE VISIT (OUTPATIENT)
Dept: FAMILY MEDICINE | Facility: CLINIC | Age: 57
End: 2022-07-05
Payer: MEDICAID

## 2022-07-05 VITALS
HEART RATE: 80 BPM | DIASTOLIC BLOOD PRESSURE: 76 MMHG | SYSTOLIC BLOOD PRESSURE: 128 MMHG | WEIGHT: 224 LBS | RESPIRATION RATE: 14 BRPM | TEMPERATURE: 99 F | BODY MASS INDEX: 41.22 KG/M2 | HEIGHT: 62 IN

## 2022-07-05 DIAGNOSIS — I25.118 CORONARY ARTERY DISEASE OF NATIVE ARTERY OF NATIVE HEART WITH STABLE ANGINA PECTORIS: ICD-10-CM

## 2022-07-05 DIAGNOSIS — E78.2 MIXED HYPERLIPIDEMIA: ICD-10-CM

## 2022-07-05 DIAGNOSIS — I10 ESSENTIAL HYPERTENSION: Primary | ICD-10-CM

## 2022-07-05 DIAGNOSIS — N39.41 URGE INCONTINENCE OF URINE: ICD-10-CM

## 2022-07-05 DIAGNOSIS — B00.1 FEVER BLISTER: ICD-10-CM

## 2022-07-05 DIAGNOSIS — J45.21 MILD INTERMITTENT ASTHMA WITH ACUTE EXACERBATION: ICD-10-CM

## 2022-07-05 DIAGNOSIS — K21.9 GASTROESOPHAGEAL REFLUX DISEASE WITHOUT ESOPHAGITIS: ICD-10-CM

## 2022-07-05 DIAGNOSIS — E66.01 CLASS 3 SEVERE OBESITY DUE TO EXCESS CALORIES WITH SERIOUS COMORBIDITY AND BODY MASS INDEX (BMI) OF 40.0 TO 44.9 IN ADULT: ICD-10-CM

## 2022-07-05 PROCEDURE — 3074F PR MOST RECENT SYSTOLIC BLOOD PRESSURE < 130 MM HG: ICD-10-PCS | Mod: CPTII,,, | Performed by: NURSE PRACTITIONER

## 2022-07-05 PROCEDURE — 4010F PR ACE/ARB THEARPY RXD/TAKEN: ICD-10-PCS | Mod: CPTII,,, | Performed by: NURSE PRACTITIONER

## 2022-07-05 PROCEDURE — 99214 PR OFFICE/OUTPT VISIT, EST, LEVL IV, 30-39 MIN: ICD-10-PCS | Mod: S$PBB,,, | Performed by: NURSE PRACTITIONER

## 2022-07-05 PROCEDURE — 3074F SYST BP LT 130 MM HG: CPT | Mod: CPTII,,, | Performed by: NURSE PRACTITIONER

## 2022-07-05 PROCEDURE — 1159F PR MEDICATION LIST DOCUMENTED IN MEDICAL RECORD: ICD-10-PCS | Mod: CPTII,,, | Performed by: NURSE PRACTITIONER

## 2022-07-05 PROCEDURE — 3078F DIAST BP <80 MM HG: CPT | Mod: CPTII,,, | Performed by: NURSE PRACTITIONER

## 2022-07-05 PROCEDURE — 3078F PR MOST RECENT DIASTOLIC BLOOD PRESSURE < 80 MM HG: ICD-10-PCS | Mod: CPTII,,, | Performed by: NURSE PRACTITIONER

## 2022-07-05 PROCEDURE — 3008F PR BODY MASS INDEX (BMI) DOCUMENTED: ICD-10-PCS | Mod: CPTII,,, | Performed by: NURSE PRACTITIONER

## 2022-07-05 PROCEDURE — 1160F PR REVIEW ALL MEDS BY PRESCRIBER/CLIN PHARMACIST DOCUMENTED: ICD-10-PCS | Mod: CPTII,,, | Performed by: NURSE PRACTITIONER

## 2022-07-05 PROCEDURE — 3008F BODY MASS INDEX DOCD: CPT | Mod: CPTII,,, | Performed by: NURSE PRACTITIONER

## 2022-07-05 PROCEDURE — 1159F MED LIST DOCD IN RCRD: CPT | Mod: CPTII,,, | Performed by: NURSE PRACTITIONER

## 2022-07-05 PROCEDURE — 99214 OFFICE O/P EST MOD 30 MIN: CPT | Performed by: NURSE PRACTITIONER

## 2022-07-05 PROCEDURE — 4010F ACE/ARB THERAPY RXD/TAKEN: CPT | Mod: CPTII,,, | Performed by: NURSE PRACTITIONER

## 2022-07-05 PROCEDURE — 1160F RVW MEDS BY RX/DR IN RCRD: CPT | Mod: CPTII,,, | Performed by: NURSE PRACTITIONER

## 2022-07-05 PROCEDURE — 99214 OFFICE O/P EST MOD 30 MIN: CPT | Mod: S$PBB,,, | Performed by: NURSE PRACTITIONER

## 2022-07-05 RX ORDER — ATENOLOL 50 MG/1
50 TABLET ORAL DAILY
Qty: 90 TABLET | Refills: 1 | Status: SHIPPED | OUTPATIENT
Start: 2022-07-05 | End: 2022-12-13

## 2022-07-05 RX ORDER — VALACYCLOVIR HYDROCHLORIDE 500 MG/1
TABLET, FILM COATED ORAL
Qty: 14 TABLET | Refills: 0 | Status: CANCELLED | OUTPATIENT
Start: 2022-07-05

## 2022-07-05 RX ORDER — VALACYCLOVIR HYDROCHLORIDE 1 G/1
TABLET, FILM COATED ORAL
Qty: 30 TABLET | Refills: 5 | Status: SHIPPED | OUTPATIENT
Start: 2022-07-05 | End: 2023-07-31 | Stop reason: SDUPTHER

## 2022-07-05 RX ORDER — LISINOPRIL 40 MG/1
40 TABLET ORAL NIGHTLY
Qty: 90 TABLET | Refills: 1 | Status: SHIPPED | OUTPATIENT
Start: 2022-07-05 | End: 2023-01-10 | Stop reason: SDUPTHER

## 2022-07-05 RX ORDER — PANTOPRAZOLE SODIUM 40 MG/1
40 TABLET, DELAYED RELEASE ORAL DAILY
Qty: 90 TABLET | Refills: 1 | Status: SHIPPED | OUTPATIENT
Start: 2022-07-05 | End: 2023-01-10 | Stop reason: SDUPTHER

## 2022-07-05 RX ORDER — ALBUTEROL SULFATE 90 UG/1
2 AEROSOL, METERED RESPIRATORY (INHALATION) EVERY 6 HOURS PRN
Qty: 18 G | Refills: 2 | Status: SHIPPED | OUTPATIENT
Start: 2022-07-05 | End: 2023-07-31 | Stop reason: SDUPTHER

## 2022-07-05 RX ORDER — ATORVASTATIN CALCIUM 80 MG/1
80 TABLET, FILM COATED ORAL NIGHTLY
Qty: 90 TABLET | Refills: 1 | Status: SHIPPED | OUTPATIENT
Start: 2022-07-05 | End: 2023-01-10 | Stop reason: SDUPTHER

## 2022-07-05 RX ORDER — SOLIFENACIN SUCCINATE 10 MG/1
10 TABLET, FILM COATED ORAL DAILY
Qty: 90 TABLET | Refills: 0 | Status: SHIPPED | OUTPATIENT
Start: 2022-07-05 | End: 2022-10-24

## 2022-07-05 NOTE — PROGRESS NOTES
Subjective:       Patient ID: Zoila Moreland is a 56 y.o. female.    Chief Complaint: Medication Refill    Patient presents today for follow-up and obtain refills for chronic conditions.  Patient has hypertension, hyperlipidemia, GERD, asthma, urge incontinence, and fever blisters.  Patient is requesting refills on all of her current medications and tolerating them well.  Patient also has recurrent left-sided sciatica as well as right shoulder pain that is chronic.  Patient feels that she does not need physical therapy nor an orthopedic referral at this time.  Patient is taking naproxen for this which is moderately helpful.  Patient is obese with a BMI of 40.97    Hypertension  This is a new problem. The current episode started 1 to 4 weeks ago. The problem has been waxing and waning since onset. The problem is controlled. Pertinent negatives include no anxiety, blurred vision, chest pain, headaches, malaise/fatigue, neck pain, orthopnea, palpitations, peripheral edema, PND, shortness of breath or sweats. There are no associated agents to hypertension. Risk factors for coronary artery disease include obesity, stress and dyslipidemia. Past treatments include ACE inhibitors and beta blockers. The current treatment provides significant improvement. Compliance problems include exercise and diet.  There is no history of angina or kidney disease. There is no history of chronic renal disease.   Hyperlipidemia  This is a chronic problem. The current episode started more than 1 month ago. The problem is uncontrolled. Recent lipid tests were reviewed and are high. Exacerbating diseases include obesity. She has no history of chronic renal disease. Pertinent negatives include no chest pain, focal weakness, leg pain or shortness of breath. Current antihyperlipidemic treatment includes statins. The current treatment provides significant improvement of lipids. Compliance problems include adherence to exercise and adherence  to diet.  Risk factors for coronary artery disease include dyslipidemia, hypertension, obesity, a sedentary lifestyle and stress.   Gastroesophageal Reflux  She complains of coughing and heartburn. She reports no abdominal pain, no chest pain, no nausea, no sore throat or no wheezing. This is a chronic problem. The current episode started more than 1 month ago. The problem occurs occasionally. The problem has been waxing and waning. The heartburn duration is less than a minute. The heartburn is located in the substernum. The heartburn is of moderate intensity. The heartburn does not wake her from sleep. The heartburn limits her activity. The heartburn doesn't change with position. The symptoms are aggravated by certain foods and exertion. Pertinent negatives include no melena or muscle weakness. Risk factors include obesity. She has tried a PPI for the symptoms. The treatment provided significant relief.   Asthma  She complains of chest tightness and cough. There is no shortness of breath or wheezing. This is a new problem. The current episode started 1 to 4 weeks ago. Asthma causes daytime symptoms weekly. Asthma causes nighttime symptoms weekly. The problem has been waxing and waning. The cough is non-productive. Associated symptoms include ear congestion and heartburn. Pertinent negatives include no appetite change, chest pain, ear pain, fever, headaches, malaise/fatigue, nasal congestion, PND, rhinorrhea, sore throat, sweats or trouble swallowing. Her symptoms are aggravated by pollen, URI and strenuous activity. Her symptoms are alleviated by beta-agonist and steroid inhaler. She reports moderate improvement on treatment. Her symptoms are not alleviated by rest. Her past medical history is significant for asthma. There is no history of bronchitis.     Review of Systems   Constitutional: Negative for activity change, appetite change, fever, malaise/fatigue and unexpected weight change.        Obesity   HENT:  Negative for congestion, ear discharge, ear pain, hearing loss, rhinorrhea, sore throat, trouble swallowing and voice change.    Eyes: Negative for blurred vision, photophobia, pain, discharge and visual disturbance.   Respiratory: Positive for cough. Negative for chest tightness, shortness of breath and wheezing.         Asthma   Cardiovascular: Negative for chest pain, palpitations, orthopnea and PND.        Hypertension, hyperlipidemia   Gastrointestinal: Positive for heartburn. Negative for abdominal pain, blood in stool, constipation, diarrhea, melena, nausea and vomiting.        Gerd   Endocrine: Negative for cold intolerance, heat intolerance, polydipsia and polyuria.   Genitourinary: Positive for urgency. Negative for difficulty urinating, dysuria, hematuria and menstrual problem.   Musculoskeletal: Positive for arthralgias. Negative for gait problem, joint swelling, muscle weakness and neck pain.   Skin: Negative for color change and rash.   Allergic/Immunologic: Negative for immunocompromised state.   Neurological: Negative for focal weakness, speech difficulty, weakness and headaches.   Psychiatric/Behavioral: Negative for confusion, dysphoric mood, self-injury and suicidal ideas.       Past Medical History:   Diagnosis Date    Allergy     Anemia 3/30/2020    Asthma     Fever blister     Hypertension       Past Surgical History:   Procedure Laterality Date    BILATERAL SALPINGOOPHORECTOMY Bilateral 5/18/2007    Torsion of ovaries and tubes    BREAST BIOPSY Right     (-)    CARDIAC CATHETERIZATION      cardiac stents  01/2017    1 stent    HYSTERECTOMY  1/13/2006    uterus and cervix due to AUB    OOPHORECTOMY      TRIGGER FINGER RELEASE Right 9/8/2020    Procedure: RELEASE, TRIGGER FINGER R-thumb;  Surgeon: Sam Bangura Jr., MD;  [facility]: [facility];  Service: Orthopedics;  Laterality: Right;       Family History   Problem Relation Age of Onset    Heart disease Mother     Diabetes Mother      Heart disease Father        Social History     Socioeconomic History    Marital status:      Spouse name: Danny    Number of children: 2    Years of education: 12th   Occupational History    Occupation: homemaker   Tobacco Use    Smoking status: Never Smoker    Smokeless tobacco: Never Used   Substance and Sexual Activity    Alcohol use: No    Drug use: No    Sexual activity: Yes     Partners: Male     Birth control/protection: Surgical   Other Topics Concern    Are you pregnant or think you may be? No    Breast-feeding No       Current Outpatient Medications   Medication Sig Dispense Refill    ascorbic acid, vitamin C, (VITAMIN C) 1000 MG tablet Take 1,000 mg by mouth once daily.      aspirin 81 MG Chew Take 1 tablet (81 mg total) by mouth once daily. 1 tablet 0    calcium-vitamin D (OSCAL) 250 (625)-125 mg-unit per tablet Take 1 tablet by mouth once daily.      ibuprofen (ADVIL,MOTRIN) 200 MG tablet Take 200 mg by mouth every 6 (six) hours as needed for Pain.      albuterol (VENTOLIN HFA) 90 mcg/actuation inhaler Inhale 2 puffs into the lungs every 6 (six) hours as needed for Wheezing. Rescue 18 g 2    atenoloL (TENORMIN) 50 MG tablet Take 1 tablet (50 mg total) by mouth once daily. 90 tablet 1    atorvastatin (LIPITOR) 80 MG tablet Take 1 tablet (80 mg total) by mouth every evening. 90 tablet 1    lisinopriL (PRINIVIL,ZESTRIL) 40 MG tablet Take 1 tablet (40 mg total) by mouth every evening. 90 tablet 1    pantoprazole (PROTONIX) 40 MG tablet Take 1 tablet (40 mg total) by mouth once daily. 90 tablet 1    solifenacin (VESICARE) 10 MG tablet Take 1 tablet (10 mg total) by mouth once daily. 90 tablet 0    valACYclovir (VALTREX) 1000 MG tablet 2 tablets PO at onset of fever blister. Repeat 12 hours later x1 dose. Repeat for repeat occurrence. 30 tablet 5     No current facility-administered medications for this visit.       Review of patient's allergies indicates:  No Known  "Allergies  Objective:      Blood pressure 128/76, pulse 80, temperature 98.8 °F (37.1 °C), resp. rate 14, height 5' 2" (1.575 m), weight 101.6 kg (224 lb). Body mass index is 40.97 kg/m².   Physical Exam  Vitals and nursing note reviewed.   Constitutional:       General: She is not in acute distress.     Appearance: Normal appearance. She is well-developed. She is obese. She is not ill-appearing.   HENT:      Head: Normocephalic and atraumatic.      Right Ear: Tympanic membrane normal.      Left Ear: Tympanic membrane normal.      Nose: Nose normal.      Mouth/Throat:      Mouth: Mucous membranes are moist.   Eyes:      General: Lids are normal. Lids are everted, no foreign bodies appreciated.      Conjunctiva/sclera: Conjunctivae normal.      Pupils: Pupils are equal, round, and reactive to light.      Right eye: Pupil is round and reactive.      Left eye: Pupil is round and reactive.   Neck:      Trachea: Trachea normal.   Cardiovascular:      Rate and Rhythm: Normal rate and regular rhythm.      Pulses: Normal pulses.      Heart sounds: Normal heart sounds, S1 normal and S2 normal.   Pulmonary:      Effort: Pulmonary effort is normal.      Breath sounds: Normal breath sounds.   Abdominal:      General: Abdomen is flat. Bowel sounds are normal.      Palpations: Abdomen is soft. Abdomen is not rigid.      Tenderness: There is no guarding.   Musculoskeletal:      Right shoulder: Tenderness present. Decreased range of motion.      Cervical back: Normal range of motion and neck supple. No muscular tenderness.        Legs:       Comments: Sciatic pain   Lymphadenopathy:      Cervical: No cervical adenopathy.   Skin:     General: Skin is warm and dry.      Capillary Refill: Capillary refill takes less than 2 seconds.   Neurological:      General: No focal deficit present.      Mental Status: She is alert and oriented to person, place, and time.   Psychiatric:         Mood and Affect: Mood normal.         Behavior: " Behavior normal. Behavior is cooperative.         Thought Content: Thought content normal.         Judgment: Judgment normal.             Assessment:       1. Essential hypertension    2. Mixed hyperlipidemia    3. Coronary artery disease of native artery of native heart with stable angina pectoris    4. Urge incontinence of urine    5. Gastroesophageal reflux disease without esophagitis Active   6. Fever blister    7. Mild intermittent asthma with acute exacerbation    8. Class 3 severe obesity due to excess calories with serious comorbidity and body mass index (BMI) of 40.0 to 44.9 in adult        Plan:       Zoila was seen today for medication refill.    Diagnoses and all orders for this visit:    Essential hypertension  -     atenoloL (TENORMIN) 50 MG tablet; Take 1 tablet (50 mg total) by mouth once daily.  -     lisinopriL (PRINIVIL,ZESTRIL) 40 MG tablet; Take 1 tablet (40 mg total) by mouth every evening.  -     CBC Auto Differential; Future  -     Comprehensive Metabolic Panel; Future  -     Urinalysis; Future  -     TSH; Future  -     CBC Auto Differential  -     Comprehensive Metabolic Panel  -     Urinalysis  -     TSH    Lifestyle changes: Reduce the amount of salt in your diet; Lose weight; Avoid drinking too much alcohol; Exercise at least 30 minutes per day most days of the week.  Continue current medications and home BP monitoring.     Mixed hyperlipidemia  -     atorvastatin (LIPITOR) 80 MG tablet; Take 1 tablet (80 mg total) by mouth every evening.  -     Lipid Panel; Future  -     Lipid Panel    Limit red meat, butter, fried foods, cheese, and other foods that have a lot of saturated fat. Consume more: lean meats, fish, fruits, vegetables, whole grains, beans, lentils, and nuts.  Weight loss, and 30-45 min of cardiovascular exercise daily.     Coronary artery disease of native artery of native heart with stable angina pectoris  -     atorvastatin (LIPITOR) 80 MG tablet; Take 1 tablet (80 mg  total) by mouth every evening.    Urge incontinence of urine  -     solifenacin (VESICARE) 10 MG tablet; Take 1 tablet (10 mg total) by mouth once daily.    Gastroesophageal reflux disease without esophagitis  -     pantoprazole (PROTONIX) 40 MG tablet; Take 1 tablet (40 mg total) by mouth once daily.    Fever blister  -     valACYclovir (VALTREX) 1000 MG tablet; 2 tablets PO at onset of fever blister. Repeat 12 hours later x1 dose. Repeat for repeat occurrence.    Mild intermittent asthma with acute exacerbation  -     albuterol (VENTOLIN HFA) 90 mcg/actuation inhaler; Inhale 2 puffs into the lungs every 6 (six) hours as needed for Wheezing. Rescue    Class 3 severe obesity due to excess calories with serious comorbidity and body mass index (BMI) of 40.0 to 44.9 in adult  The patient is asked to make an attempt to improve diet and exercise patterns to aid in medical management of this problem.      Follow up in 6 months and sooner if needed for chronic conditions management with labs to be completed within 2 weeks. Will notify of labs when available.

## 2022-12-13 PROBLEM — Z91.89 MULTIPLE RISK FACTORS FOR CORONARY ARTERY DISEASE: Status: ACTIVE | Noted: 2022-12-13

## 2022-12-13 PROBLEM — E66.813 CLASS 3 SEVERE OBESITY DUE TO EXCESS CALORIES WITHOUT SERIOUS COMORBIDITY WITH BODY MASS INDEX (BMI) OF 40.0 TO 44.9 IN ADULT: Status: ACTIVE | Noted: 2022-12-13

## 2022-12-13 PROBLEM — Z92.89 HISTORY OF ECHOCARDIOGRAM: Status: ACTIVE | Noted: 2022-12-13

## 2022-12-13 PROBLEM — E66.01 CLASS 3 SEVERE OBESITY DUE TO EXCESS CALORIES WITHOUT SERIOUS COMORBIDITY WITH BODY MASS INDEX (BMI) OF 40.0 TO 44.9 IN ADULT: Status: ACTIVE | Noted: 2022-12-13

## 2022-12-13 PROBLEM — J45.909 ASTHMA IN ADULT WITHOUT COMPLICATION: Status: ACTIVE | Noted: 2022-12-13

## 2022-12-13 PROBLEM — E66.01 MORBID OBESITY WITH BMI OF 40.0-44.9, ADULT: Status: RESOLVED | Noted: 2021-10-22 | Resolved: 2022-12-13

## 2023-01-04 ENCOUNTER — TELEPHONE (OUTPATIENT)
Dept: FAMILY MEDICINE | Facility: CLINIC | Age: 58
End: 2023-01-04

## 2023-01-04 DIAGNOSIS — N39.41 URGE INCONTINENCE OF URINE: ICD-10-CM

## 2023-01-04 DIAGNOSIS — I10 ESSENTIAL HYPERTENSION: ICD-10-CM

## 2023-01-04 DIAGNOSIS — E78.2 MIXED HYPERLIPIDEMIA: Primary | ICD-10-CM

## 2023-01-10 ENCOUNTER — OFFICE VISIT (OUTPATIENT)
Dept: FAMILY MEDICINE | Facility: CLINIC | Age: 58
End: 2023-01-10
Payer: MEDICAID

## 2023-01-10 VITALS
WEIGHT: 225 LBS | OXYGEN SATURATION: 99 % | TEMPERATURE: 99 F | HEART RATE: 63 BPM | BODY MASS INDEX: 41.41 KG/M2 | DIASTOLIC BLOOD PRESSURE: 82 MMHG | SYSTOLIC BLOOD PRESSURE: 132 MMHG | HEIGHT: 62 IN

## 2023-01-10 DIAGNOSIS — R09.82 POST-NASAL DRAINAGE: ICD-10-CM

## 2023-01-10 DIAGNOSIS — J45.21 MILD INTERMITTENT ASTHMA WITH ACUTE EXACERBATION: ICD-10-CM

## 2023-01-10 DIAGNOSIS — I10 ESSENTIAL HYPERTENSION: Primary | ICD-10-CM

## 2023-01-10 DIAGNOSIS — I25.118 CORONARY ARTERY DISEASE OF NATIVE ARTERY OF NATIVE HEART WITH STABLE ANGINA PECTORIS: ICD-10-CM

## 2023-01-10 DIAGNOSIS — E66.01 CLASS 3 SEVERE OBESITY DUE TO EXCESS CALORIES WITH SERIOUS COMORBIDITY AND BODY MASS INDEX (BMI) OF 40.0 TO 44.9 IN ADULT: ICD-10-CM

## 2023-01-10 DIAGNOSIS — N39.41 URGE INCONTINENCE OF URINE: ICD-10-CM

## 2023-01-10 DIAGNOSIS — K21.9 GASTROESOPHAGEAL REFLUX DISEASE WITHOUT ESOPHAGITIS: ICD-10-CM

## 2023-01-10 DIAGNOSIS — E78.2 MIXED HYPERLIPIDEMIA: ICD-10-CM

## 2023-01-10 PROCEDURE — 4010F ACE/ARB THERAPY RXD/TAKEN: CPT | Mod: CPTII,,, | Performed by: NURSE PRACTITIONER

## 2023-01-10 PROCEDURE — 3079F PR MOST RECENT DIASTOLIC BLOOD PRESSURE 80-89 MM HG: ICD-10-PCS | Mod: CPTII,,, | Performed by: NURSE PRACTITIONER

## 2023-01-10 PROCEDURE — 99215 OFFICE O/P EST HI 40 MIN: CPT | Performed by: NURSE PRACTITIONER

## 2023-01-10 PROCEDURE — 1159F MED LIST DOCD IN RCRD: CPT | Mod: CPTII,,, | Performed by: NURSE PRACTITIONER

## 2023-01-10 PROCEDURE — 1159F PR MEDICATION LIST DOCUMENTED IN MEDICAL RECORD: ICD-10-PCS | Mod: CPTII,,, | Performed by: NURSE PRACTITIONER

## 2023-01-10 PROCEDURE — 3079F DIAST BP 80-89 MM HG: CPT | Mod: CPTII,,, | Performed by: NURSE PRACTITIONER

## 2023-01-10 PROCEDURE — 99214 PR OFFICE/OUTPT VISIT, EST, LEVL IV, 30-39 MIN: ICD-10-PCS | Mod: S$PBB,,, | Performed by: NURSE PRACTITIONER

## 2023-01-10 PROCEDURE — 99214 OFFICE O/P EST MOD 30 MIN: CPT | Mod: S$PBB,,, | Performed by: NURSE PRACTITIONER

## 2023-01-10 PROCEDURE — 4010F PR ACE/ARB THEARPY RXD/TAKEN: ICD-10-PCS | Mod: CPTII,,, | Performed by: NURSE PRACTITIONER

## 2023-01-10 PROCEDURE — 3008F PR BODY MASS INDEX (BMI) DOCUMENTED: ICD-10-PCS | Mod: CPTII,,, | Performed by: NURSE PRACTITIONER

## 2023-01-10 PROCEDURE — 3008F BODY MASS INDEX DOCD: CPT | Mod: CPTII,,, | Performed by: NURSE PRACTITIONER

## 2023-01-10 PROCEDURE — 1160F RVW MEDS BY RX/DR IN RCRD: CPT | Mod: CPTII,,, | Performed by: NURSE PRACTITIONER

## 2023-01-10 PROCEDURE — 3075F PR MOST RECENT SYSTOLIC BLOOD PRESS GE 130-139MM HG: ICD-10-PCS | Mod: CPTII,,, | Performed by: NURSE PRACTITIONER

## 2023-01-10 PROCEDURE — 1160F PR REVIEW ALL MEDS BY PRESCRIBER/CLIN PHARMACIST DOCUMENTED: ICD-10-PCS | Mod: CPTII,,, | Performed by: NURSE PRACTITIONER

## 2023-01-10 PROCEDURE — 3075F SYST BP GE 130 - 139MM HG: CPT | Mod: CPTII,,, | Performed by: NURSE PRACTITIONER

## 2023-01-10 RX ORDER — FLUTICASONE PROPIONATE 50 MCG
1 SPRAY, SUSPENSION (ML) NASAL 2 TIMES DAILY
Qty: 16 G | Refills: 1 | Status: SHIPPED | OUTPATIENT
Start: 2023-01-10 | End: 2024-01-31 | Stop reason: SDUPTHER

## 2023-01-10 RX ORDER — ATORVASTATIN CALCIUM 80 MG/1
80 TABLET, FILM COATED ORAL NIGHTLY
Qty: 90 TABLET | Refills: 1 | Status: SHIPPED | OUTPATIENT
Start: 2023-01-10 | End: 2023-06-26

## 2023-01-10 RX ORDER — SOLIFENACIN SUCCINATE 10 MG/1
10 TABLET, FILM COATED ORAL DAILY
Qty: 90 TABLET | Refills: 1 | Status: SHIPPED | OUTPATIENT
Start: 2023-01-10 | End: 2023-07-31 | Stop reason: SDUPTHER

## 2023-01-10 RX ORDER — LISINOPRIL 40 MG/1
40 TABLET ORAL NIGHTLY
Qty: 90 TABLET | Refills: 1 | Status: SHIPPED | OUTPATIENT
Start: 2023-01-10 | End: 2023-06-26

## 2023-01-10 RX ORDER — PANTOPRAZOLE SODIUM 40 MG/1
40 TABLET, DELAYED RELEASE ORAL DAILY
Qty: 90 TABLET | Refills: 1 | Status: SHIPPED | OUTPATIENT
Start: 2023-01-10 | End: 2023-06-26

## 2023-01-10 NOTE — PROGRESS NOTES
Subjective:       Patient ID: Zoila Moreland is a 57 y.o. female.    Chief Complaint: Hypertension, Hyperlipidemia, and overactive bladder    Patient presents today for follow-up and obtain refills for chronic conditions.  Patient has hypertension, hyperlipidemia, GERD, asthma, urge incontinence, and fever blisters.  Patient is requesting refills on all of her current medications and tolerating them well.  She has ear fullness and post nasal drainage that she would like evaluated today.   Patient is obese with a BMI of 41.15    Hypertension  This is a chronic problem. The current episode started more than 1 year ago. The problem has been waxing and waning since onset. The problem is controlled. Pertinent negatives include no anxiety, blurred vision, chest pain, headaches, malaise/fatigue, neck pain, orthopnea, palpitations, peripheral edema, PND, shortness of breath or sweats. There are no associated agents to hypertension. Risk factors for coronary artery disease include obesity, stress and dyslipidemia. Past treatments include ACE inhibitors and beta blockers. The current treatment provides significant improvement. Compliance problems include exercise and diet.  There is no history of angina or kidney disease. There is no history of chronic renal disease.   Hyperlipidemia  This is a chronic problem. The current episode started more than 1 month ago. The problem is uncontrolled. Recent lipid tests were reviewed and are variable. Exacerbating diseases include obesity. She has no history of chronic renal disease. Pertinent negatives include no chest pain, focal weakness, leg pain or shortness of breath. Current antihyperlipidemic treatment includes statins. The current treatment provides significant improvement of lipids. Compliance problems include adherence to exercise and adherence to diet.  Risk factors for coronary artery disease include dyslipidemia, hypertension, obesity, a sedentary lifestyle and  stress.   Gastroesophageal Reflux  She complains of heartburn. She reports no abdominal pain, no chest pain, no coughing, no nausea, no sore throat or no wheezing. This is a chronic problem. The current episode started more than 1 month ago. The problem occurs occasionally. The problem has been waxing and waning. The heartburn duration is less than a minute. The heartburn is located in the substernum. The heartburn is of moderate intensity. The heartburn does not wake her from sleep. The heartburn limits her activity. The heartburn doesn't change with position. The symptoms are aggravated by certain foods and exertion. Pertinent negatives include no melena or muscle weakness. Risk factors include obesity. She has tried a PPI for the symptoms. The treatment provided significant relief.   Asthma  She complains of chest tightness. There is no cough, shortness of breath or wheezing. This is a new problem. The current episode started 1 to 4 weeks ago. Asthma causes daytime symptoms weekly. Asthma causes nighttime symptoms weekly. The problem has been waxing and waning. The cough is non-productive. Associated symptoms include ear congestion and heartburn. Pertinent negatives include no appetite change, chest pain, ear pain, fever, headaches, malaise/fatigue, nasal congestion, PND, rhinorrhea, sore throat, sweats or trouble swallowing. Her symptoms are aggravated by pollen, URI and strenuous activity. Her symptoms are alleviated by beta-agonist and steroid inhaler. She reports moderate improvement on treatment. Her symptoms are not alleviated by rest. Her past medical history is significant for asthma. There is no history of bronchitis.   Review of Systems   Constitutional:  Negative for activity change, appetite change, fever, malaise/fatigue and unexpected weight change.        Obesity   HENT:  Negative for congestion, ear discharge, ear pain, hearing loss, rhinorrhea, sore throat, trouble swallowing and voice change.     Eyes:  Negative for blurred vision, photophobia, pain, discharge and visual disturbance.   Respiratory:  Negative for cough, chest tightness, shortness of breath and wheezing.         Asthma   Cardiovascular:  Negative for chest pain, palpitations, orthopnea and PND.        Hypertension, hyperlipidemia   Gastrointestinal:  Positive for heartburn. Negative for abdominal pain, blood in stool, constipation, diarrhea, melena, nausea and vomiting.        Gerd   Endocrine: Negative for cold intolerance, heat intolerance, polydipsia and polyuria.   Genitourinary:  Positive for urgency. Negative for difficulty urinating, dysuria, hematuria and menstrual problem.   Musculoskeletal:  Positive for arthralgias. Negative for gait problem, joint swelling, muscle weakness and neck pain.   Skin:  Negative for color change and rash.   Allergic/Immunologic: Negative for immunocompromised state.   Neurological:  Negative for focal weakness, speech difficulty, weakness and headaches.   Psychiatric/Behavioral:  Negative for confusion, dysphoric mood, self-injury and suicidal ideas.      Past Medical History:   Diagnosis Date    Allergy     Anemia 3/30/2020    Asthma     Fever blister     Hypertension       Past Surgical History:   Procedure Laterality Date    BILATERAL SALPINGOOPHORECTOMY Bilateral 5/18/2007    Torsion of ovaries and tubes    BREAST BIOPSY Right     (-)    CARDIAC CATHETERIZATION      cardiac stents  01/2017    1 stent    HYSTERECTOMY  1/13/2006    uterus and cervix due to AUB    OOPHORECTOMY      TRIGGER FINGER RELEASE Right 9/8/2020    Procedure: RELEASE, TRIGGER FINGER R-thumb;  Surgeon: Sam Bangura Jr., MD;  Location: [facility];  Service: Orthopedics;  Laterality: Right;       Family History   Problem Relation Age of Onset    Heart disease Mother     Diabetes Mother     Heart disease Father        Social History     Socioeconomic History    Marital status:      Spouse name: Danny    Number of children: 2     Years of education: 12th   Occupational History    Occupation: homemaker   Tobacco Use    Smoking status: Never    Smokeless tobacco: Never   Substance and Sexual Activity    Alcohol use: No    Drug use: No    Sexual activity: Yes     Partners: Male     Birth control/protection: Surgical   Other Topics Concern    Are you pregnant or think you may be? No    Breast-feeding No       Current Outpatient Medications   Medication Sig Dispense Refill    albuterol (VENTOLIN HFA) 90 mcg/actuation inhaler Inhale 2 puffs into the lungs every 6 (six) hours as needed for Wheezing. Rescue 18 g 2    ascorbic acid, vitamin C, (VITAMIN C) 1000 MG tablet Take 1,000 mg by mouth once daily.      aspirin 81 MG Chew Take 1 tablet (81 mg total) by mouth once daily. 1 tablet 0    calcium-vitamin D (OSCAL) 250 (625)-125 mg-unit per tablet Take 1 tablet by mouth once daily.      carvediloL (COREG) 25 MG tablet Take 1 tablet (25 mg total) by mouth 2 (two) times daily with meals. 180 tablet 3    spironolactone (ALDACTONE) 25 MG tablet Take 1 tablet (25 mg total) by mouth once daily. 90 tablet 3    valACYclovir (VALTREX) 1000 MG tablet 2 tablets PO at onset of fever blister. Repeat 12 hours later x1 dose. Repeat for repeat occurrence. 30 tablet 5    atorvastatin (LIPITOR) 80 MG tablet Take 1 tablet (80 mg total) by mouth every evening. 90 tablet 1    fluticasone propionate (FLONASE) 50 mcg/actuation nasal spray 1 spray (50 mcg total) by Each Nostril route 2 (two) times daily. 16 g 1    lisinopriL (PRINIVIL,ZESTRIL) 40 MG tablet Take 1 tablet (40 mg total) by mouth every evening. 90 tablet 1    pantoprazole (PROTONIX) 40 MG tablet Take 1 tablet (40 mg total) by mouth once daily. 90 tablet 1    solifenacin (VESICARE) 10 MG tablet Take 1 tablet (10 mg total) by mouth once daily. Appointment required for future refills 90 tablet 1     No current facility-administered medications for this visit.       Review of patient's allergies indicates:  No  "Known Allergies  Objective:      Blood pressure 132/82, pulse 63, temperature 98.6 °F (37 °C), height 5' 2" (1.575 m), weight 102.1 kg (225 lb), SpO2 99 %. Body mass index is 41.15 kg/m².   Physical Exam  Vitals and nursing note reviewed.   Constitutional:       General: She is not in acute distress.     Appearance: Normal appearance. She is well-developed. She is obese. She is not ill-appearing.   HENT:      Head: Normocephalic and atraumatic.      Right Ear: Tympanic membrane normal.      Left Ear: Tympanic membrane normal.      Nose: Nose normal.      Mouth/Throat:      Mouth: Mucous membranes are moist.   Eyes:      General: Lids are normal. Lids are everted, no foreign bodies appreciated.      Conjunctiva/sclera: Conjunctivae normal.      Pupils: Pupils are equal, round, and reactive to light.      Right eye: Pupil is round and reactive.      Left eye: Pupil is round and reactive.   Neck:      Trachea: Trachea normal.   Cardiovascular:      Rate and Rhythm: Normal rate and regular rhythm.      Pulses: Normal pulses.      Heart sounds: Normal heart sounds, S1 normal and S2 normal.   Pulmonary:      Effort: Pulmonary effort is normal.      Breath sounds: Normal breath sounds.   Abdominal:      General: Abdomen is flat. Bowel sounds are normal.      Palpations: Abdomen is soft. Abdomen is not rigid.      Tenderness: There is no guarding.   Musculoskeletal:      Right shoulder: Normal.      Left shoulder: Normal.      Cervical back: Normal range of motion and neck supple. No muscular tenderness.   Lymphadenopathy:      Cervical: No cervical adenopathy.   Skin:     General: Skin is warm and dry.      Capillary Refill: Capillary refill takes less than 2 seconds.   Neurological:      General: No focal deficit present.      Mental Status: She is alert and oriented to person, place, and time.   Psychiatric:         Mood and Affect: Mood normal.         Behavior: Behavior normal. Behavior is cooperative.         Thought " Content: Thought content normal.         Judgment: Judgment normal.           Assessment:       1. Essential hypertension    2. Coronary artery disease of native artery of native heart with stable angina pectoris    3. Mixed hyperlipidemia    4. Gastroesophageal reflux disease without esophagitis Active   5. Urge incontinence of urine    6. Post-nasal drainage    7. Mild intermittent asthma with acute exacerbation    8. Class 3 severe obesity due to excess calories with serious comorbidity and body mass index (BMI) of 40.0 to 44.9 in adult        Plan:       Zoila was seen today for hypertension, hyperlipidemia and overactive bladder.    Diagnoses and all orders for this visit:    Essential hypertension  -     lisinopriL (PRINIVIL,ZESTRIL) 40 MG tablet; Take 1 tablet (40 mg total) by mouth every evening.  Continue current medications  Lifestyle changes: Reduce the amount of salt in your diet; Lose weight; Avoid drinking too much alcohol; Exercise at least 30 minutes per day most days of the week.  Continue current medications and home BP monitoring.      Coronary artery disease of native artery of native heart with stable angina pectoris  -     atorvastatin (LIPITOR) 80 MG tablet; Take 1 tablet (80 mg total) by mouth every evening.    Mixed hyperlipidemia  -     atorvastatin (LIPITOR) 80 MG tablet; Take 1 tablet (80 mg total) by mouth every evening.  Limit red meat, butter, fried foods, cheese, and other foods that have a lot of saturated fat. Consume more: lean meats, fish, fruits, vegetables, whole grains, beans, lentils, and nuts.  Weight loss, and 30-45 min of cardiovascular exercise daily.      Gastroesophageal reflux disease without esophagitis  -     pantoprazole (PROTONIX) 40 MG tablet; Take 1 tablet (40 mg total) by mouth once daily.    Urge incontinence of urine  -     solifenacin (VESICARE) 10 MG tablet; Take 1 tablet (10 mg total) by mouth once daily. Appointment required for future  refills    Post-nasal drainage  -     fluticasone propionate (FLONASE) 50 mcg/actuation nasal spray; 1 spray (50 mcg total) by Each Nostril route 2 (two) times daily.    Mild intermittent asthma with acute exacerbation  Stable.  Continue current inhalers    Class 3 severe obesity due to excess calories with serious comorbidity and body mass index (BMI) of 40.0 to 44.9 in adult  The patient is asked to make an attempt to improve diet and exercise patterns to aid in medical management of this problem.      Follow up with me in 3 months for wellness. Obtain labs. Will notify of labs results when available and received.

## 2023-02-09 LAB
APPEARANCE UR: CLEAR
BACTERIA #/AREA URNS HPF: NORMAL /[HPF]
BASOPHILS # BLD AUTO: 0 X10E3/UL (ref 0–0.2)
BASOPHILS NFR BLD AUTO: 1 %
BILIRUB UR QL STRIP: NEGATIVE
CASTS URNS QL MICRO: NORMAL /LPF
CHOLEST SERPL-MCNC: 151 MG/DL (ref 100–199)
COLOR UR: YELLOW
EOSINOPHIL # BLD AUTO: 0.1 X10E3/UL (ref 0–0.4)
EOSINOPHIL NFR BLD AUTO: 2 %
EPI CELLS #/AREA URNS HPF: NORMAL /HPF (ref 0–10)
ERYTHROCYTE [DISTWIDTH] IN BLOOD BY AUTOMATED COUNT: 18.5 % (ref 11.7–15.4)
GLUCOSE UR QL STRIP: NEGATIVE
HCT VFR BLD AUTO: 39.6 % (ref 34–46.6)
HDLC SERPL-MCNC: 57 MG/DL
HGB BLD-MCNC: 11.9 G/DL (ref 11.1–15.9)
HGB UR QL STRIP: NEGATIVE
IMM GRANULOCYTES # BLD AUTO: 0 X10E3/UL (ref 0–0.1)
IMM GRANULOCYTES NFR BLD AUTO: 0 %
KETONES UR QL STRIP: NEGATIVE
LDLC SERPL CALC-MCNC: 77 MG/DL (ref 0–99)
LEUKOCYTE ESTERASE UR QL STRIP: ABNORMAL
LYMPHOCYTES # BLD AUTO: 2.1 X10E3/UL (ref 0.7–3.1)
LYMPHOCYTES NFR BLD AUTO: 36 %
MCH RBC QN AUTO: 20 PG (ref 26.6–33)
MCHC RBC AUTO-ENTMCNC: 30.1 G/DL (ref 31.5–35.7)
MCV RBC AUTO: 67 FL (ref 79–97)
MICRO URNS: ABNORMAL
MONOCYTES # BLD AUTO: 0.3 X10E3/UL (ref 0.1–0.9)
MONOCYTES NFR BLD AUTO: 6 %
NEUTROPHILS # BLD AUTO: 3.3 X10E3/UL (ref 1.4–7)
NEUTROPHILS NFR BLD AUTO: 55 %
NITRITE UR QL STRIP: NEGATIVE
PH UR STRIP: 6 [PH] (ref 5–7.5)
PLATELET # BLD AUTO: 311 X10E3/UL (ref 150–450)
PROT UR QL STRIP: NEGATIVE
RBC # BLD AUTO: 5.94 X10E6/UL (ref 3.77–5.28)
RBC #/AREA URNS HPF: NORMAL /HPF (ref 0–2)
SP GR UR STRIP: 1.02 (ref 1–1.03)
TRIGL SERPL-MCNC: 92 MG/DL (ref 0–149)
TSH SERPL DL<=0.005 MIU/L-ACNC: 1.86 UIU/ML (ref 0.45–4.5)
UROBILINOGEN UR STRIP-MCNC: 0.2 MG/DL (ref 0.2–1)
VLDLC SERPL CALC-MCNC: 17 MG/DL (ref 5–40)
WBC # BLD AUTO: 5.8 X10E3/UL (ref 3.4–10.8)
WBC #/AREA URNS HPF: NORMAL /HPF (ref 0–5)

## 2023-06-26 DIAGNOSIS — I10 ESSENTIAL HYPERTENSION: ICD-10-CM

## 2023-06-26 DIAGNOSIS — K21.9 GASTROESOPHAGEAL REFLUX DISEASE WITHOUT ESOPHAGITIS: ICD-10-CM

## 2023-06-26 DIAGNOSIS — E78.2 MIXED HYPERLIPIDEMIA: ICD-10-CM

## 2023-06-26 DIAGNOSIS — I25.118 CORONARY ARTERY DISEASE OF NATIVE ARTERY OF NATIVE HEART WITH STABLE ANGINA PECTORIS: ICD-10-CM

## 2023-06-26 RX ORDER — LISINOPRIL 40 MG/1
TABLET ORAL
Qty: 90 TABLET | Refills: 0 | Status: SHIPPED | OUTPATIENT
Start: 2023-06-26 | End: 2023-07-12 | Stop reason: SDUPTHER

## 2023-06-26 RX ORDER — ATORVASTATIN CALCIUM 80 MG/1
TABLET, FILM COATED ORAL
Qty: 90 TABLET | Refills: 0 | Status: SHIPPED | OUTPATIENT
Start: 2023-06-26 | End: 2023-07-12 | Stop reason: SDUPTHER

## 2023-06-26 RX ORDER — PANTOPRAZOLE SODIUM 40 MG/1
TABLET, DELAYED RELEASE ORAL
Qty: 90 TABLET | Refills: 0 | Status: SHIPPED | OUTPATIENT
Start: 2023-06-26 | End: 2023-07-31 | Stop reason: SDUPTHER

## 2023-07-12 PROBLEM — J45.20 MILD INTERMITTENT ASTHMA WITHOUT COMPLICATION: Status: RESOLVED | Noted: 2021-10-22 | Resolved: 2023-07-12

## 2023-07-13 DIAGNOSIS — Z12.31 OTHER SCREENING MAMMOGRAM: ICD-10-CM

## 2023-07-31 ENCOUNTER — OFFICE VISIT (OUTPATIENT)
Dept: FAMILY MEDICINE | Facility: CLINIC | Age: 58
End: 2023-07-31
Payer: MEDICAID

## 2023-07-31 VITALS
HEIGHT: 62 IN | OXYGEN SATURATION: 99 % | DIASTOLIC BLOOD PRESSURE: 70 MMHG | BODY MASS INDEX: 42.18 KG/M2 | SYSTOLIC BLOOD PRESSURE: 100 MMHG | HEART RATE: 58 BPM | WEIGHT: 229.19 LBS

## 2023-07-31 DIAGNOSIS — M79.605 LEFT LEG PAIN: Primary | ICD-10-CM

## 2023-07-31 DIAGNOSIS — N39.41 URGE INCONTINENCE OF URINE: ICD-10-CM

## 2023-07-31 DIAGNOSIS — M54.42 CHRONIC BILATERAL LOW BACK PAIN WITH LEFT-SIDED SCIATICA: ICD-10-CM

## 2023-07-31 DIAGNOSIS — Z12.11 COLON CANCER SCREENING: ICD-10-CM

## 2023-07-31 DIAGNOSIS — E66.01 CLASS 3 SEVERE OBESITY DUE TO EXCESS CALORIES WITH SERIOUS COMORBIDITY AND BODY MASS INDEX (BMI) OF 40.0 TO 44.9 IN ADULT: ICD-10-CM

## 2023-07-31 DIAGNOSIS — G89.29 CHRONIC BILATERAL LOW BACK PAIN WITH LEFT-SIDED SCIATICA: ICD-10-CM

## 2023-07-31 DIAGNOSIS — K21.9 GASTROESOPHAGEAL REFLUX DISEASE WITHOUT ESOPHAGITIS: ICD-10-CM

## 2023-07-31 DIAGNOSIS — M62.838 MUSCLE SPASM: ICD-10-CM

## 2023-07-31 DIAGNOSIS — J45.21 MILD INTERMITTENT ASTHMA WITH ACUTE EXACERBATION: ICD-10-CM

## 2023-07-31 DIAGNOSIS — B00.1 FEVER BLISTER: ICD-10-CM

## 2023-07-31 DIAGNOSIS — M54.32 SCIATICA OF LEFT SIDE: ICD-10-CM

## 2023-07-31 PROCEDURE — 3078F PR MOST RECENT DIASTOLIC BLOOD PRESSURE < 80 MM HG: ICD-10-PCS | Mod: CPTII,,, | Performed by: NURSE PRACTITIONER

## 2023-07-31 PROCEDURE — 4010F ACE/ARB THERAPY RXD/TAKEN: CPT | Mod: CPTII,,, | Performed by: NURSE PRACTITIONER

## 2023-07-31 PROCEDURE — 4010F PR ACE/ARB THEARPY RXD/TAKEN: ICD-10-PCS | Mod: CPTII,,, | Performed by: NURSE PRACTITIONER

## 2023-07-31 PROCEDURE — 99214 PR OFFICE/OUTPT VISIT, EST, LEVL IV, 30-39 MIN: ICD-10-PCS | Mod: S$PBB,,, | Performed by: NURSE PRACTITIONER

## 2023-07-31 PROCEDURE — 3008F BODY MASS INDEX DOCD: CPT | Mod: CPTII,,, | Performed by: NURSE PRACTITIONER

## 2023-07-31 PROCEDURE — 3078F DIAST BP <80 MM HG: CPT | Mod: CPTII,,, | Performed by: NURSE PRACTITIONER

## 2023-07-31 PROCEDURE — 3008F PR BODY MASS INDEX (BMI) DOCUMENTED: ICD-10-PCS | Mod: CPTII,,, | Performed by: NURSE PRACTITIONER

## 2023-07-31 PROCEDURE — 1160F RVW MEDS BY RX/DR IN RCRD: CPT | Mod: CPTII,,, | Performed by: NURSE PRACTITIONER

## 2023-07-31 PROCEDURE — 3074F PR MOST RECENT SYSTOLIC BLOOD PRESSURE < 130 MM HG: ICD-10-PCS | Mod: CPTII,,, | Performed by: NURSE PRACTITIONER

## 2023-07-31 PROCEDURE — 3074F SYST BP LT 130 MM HG: CPT | Mod: CPTII,,, | Performed by: NURSE PRACTITIONER

## 2023-07-31 PROCEDURE — 99214 OFFICE O/P EST MOD 30 MIN: CPT | Mod: S$PBB,,, | Performed by: NURSE PRACTITIONER

## 2023-07-31 PROCEDURE — 1160F PR REVIEW ALL MEDS BY PRESCRIBER/CLIN PHARMACIST DOCUMENTED: ICD-10-PCS | Mod: CPTII,,, | Performed by: NURSE PRACTITIONER

## 2023-07-31 PROCEDURE — 99214 OFFICE O/P EST MOD 30 MIN: CPT | Performed by: NURSE PRACTITIONER

## 2023-07-31 PROCEDURE — 1159F MED LIST DOCD IN RCRD: CPT | Mod: CPTII,,, | Performed by: NURSE PRACTITIONER

## 2023-07-31 PROCEDURE — 1159F PR MEDICATION LIST DOCUMENTED IN MEDICAL RECORD: ICD-10-PCS | Mod: CPTII,,, | Performed by: NURSE PRACTITIONER

## 2023-07-31 RX ORDER — SOLIFENACIN SUCCINATE 10 MG/1
10 TABLET, FILM COATED ORAL DAILY
Qty: 90 TABLET | Refills: 1 | Status: SHIPPED | OUTPATIENT
Start: 2023-07-31

## 2023-07-31 RX ORDER — VALACYCLOVIR HYDROCHLORIDE 1 G/1
TABLET, FILM COATED ORAL
Qty: 30 TABLET | Refills: 5 | Status: SHIPPED | OUTPATIENT
Start: 2023-07-31

## 2023-07-31 RX ORDER — ALBUTEROL SULFATE 90 UG/1
2 AEROSOL, METERED RESPIRATORY (INHALATION) EVERY 6 HOURS PRN
Qty: 18 G | Refills: 2 | Status: SHIPPED | OUTPATIENT
Start: 2023-07-31 | End: 2023-10-03

## 2023-07-31 RX ORDER — METHOCARBAMOL 500 MG/1
500 TABLET, FILM COATED ORAL NIGHTLY PRN
Qty: 40 TABLET | Refills: 0 | Status: SHIPPED | OUTPATIENT
Start: 2023-07-31 | End: 2023-08-10

## 2023-07-31 RX ORDER — PANTOPRAZOLE SODIUM 40 MG/1
40 TABLET, DELAYED RELEASE ORAL DAILY
Qty: 90 TABLET | Refills: 1 | Status: SHIPPED | OUTPATIENT
Start: 2023-07-31 | End: 2024-01-31 | Stop reason: SDUPTHER

## 2023-07-31 NOTE — PROGRESS NOTES
Subjective:       Patient ID: Zoila Moreland is a 57 y.o. female.    Chief Complaint: Back Pain, Leg Pain, and Nail Problem    Patient presents today for follow-up and obtain refills for chronic conditions.  Patient has hypertension, hyperlipidemia, GERD, asthma, urge incontinence, and fever blisters.  Patient is requesting refills on all of her current medications and tolerating them well.  Patient is also having low back and left leg pain that is worsening. She has tried stretching exercises but they have not been helpful. She was offered physical therapy but has previously declined. She states she is willing to go physical therapy at this point.   Patient is obese with a BMI of 41.92    Hypertension  This is a chronic problem. The current episode started more than 1 year ago. The problem has been waxing and waning since onset. The problem is controlled. Pertinent negatives include no anxiety, blurred vision, chest pain, headaches, malaise/fatigue, neck pain, orthopnea, palpitations, peripheral edema, PND, shortness of breath or sweats. There are no associated agents to hypertension. Risk factors for coronary artery disease include obesity, stress and dyslipidemia. Past treatments include ACE inhibitors and beta blockers. The current treatment provides significant improvement. Compliance problems include exercise and diet.  There is no history of angina or kidney disease. There is no history of chronic renal disease.   Hyperlipidemia  This is a chronic problem. The current episode started more than 1 month ago. The problem is uncontrolled. Recent lipid tests were reviewed and are variable. Exacerbating diseases include obesity. She has no history of chronic renal disease. Pertinent negatives include no chest pain, focal weakness, leg pain or shortness of breath. Current antihyperlipidemic treatment includes statins. The current treatment provides significant improvement of lipids. Compliance problems  include adherence to exercise and adherence to diet.  Risk factors for coronary artery disease include dyslipidemia, hypertension, obesity, a sedentary lifestyle and stress.   Gastroesophageal Reflux  She complains of heartburn. She reports no abdominal pain, no chest pain, no coughing, no nausea, no sore throat or no wheezing. This is a chronic problem. The current episode started more than 1 month ago. The problem occurs occasionally. The problem has been waxing and waning. The heartburn duration is less than a minute. The heartburn is located in the substernum. The heartburn is of moderate intensity. The heartburn does not wake her from sleep. The heartburn limits her activity. The heartburn doesn't change with position. The symptoms are aggravated by certain foods and exertion. Pertinent negatives include no melena or muscle weakness. Risk factors include obesity. She has tried a PPI for the symptoms. The treatment provided significant relief.   Asthma  She complains of chest tightness. There is no cough, shortness of breath or wheezing. This is a new problem. The current episode started 1 to 4 weeks ago. Asthma causes daytime symptoms weekly. Asthma causes nighttime symptoms weekly. The problem has been waxing and waning. The cough is non-productive. Associated symptoms include ear congestion and heartburn. Pertinent negatives include no appetite change, chest pain, ear pain, fever, headaches, malaise/fatigue, nasal congestion, PND, rhinorrhea, sore throat, sweats or trouble swallowing. Her symptoms are aggravated by pollen, URI and strenuous activity. Her symptoms are alleviated by beta-agonist and steroid inhaler. She reports moderate improvement on treatment. Her symptoms are not alleviated by rest. Her past medical history is significant for asthma. There is no history of bronchitis.     Review of Systems   Constitutional:  Negative for activity change, appetite change, fever, malaise/fatigue and unexpected  weight change.        Obesity   HENT:  Negative for congestion, ear discharge, ear pain, hearing loss, rhinorrhea, sore throat, trouble swallowing and voice change.    Eyes:  Negative for blurred vision, photophobia, pain, discharge and visual disturbance.   Respiratory:  Negative for cough, chest tightness, shortness of breath and wheezing.         Asthma   Cardiovascular:  Negative for chest pain, palpitations, orthopnea and PND.        Hypertension, hyperlipidemia   Gastrointestinal:  Positive for heartburn. Negative for abdominal pain, blood in stool, constipation, diarrhea, melena, nausea and vomiting.        Gerd   Endocrine: Negative for cold intolerance, heat intolerance, polydipsia and polyuria.   Genitourinary:  Positive for urgency. Negative for difficulty urinating, dysuria, hematuria and menstrual problem.   Musculoskeletal:  Positive for arthralgias. Negative for gait problem, joint swelling, muscle weakness and neck pain.   Skin:  Negative for color change and rash.   Allergic/Immunologic: Negative for immunocompromised state.   Neurological:  Negative for focal weakness, speech difficulty, weakness and headaches.   Psychiatric/Behavioral:  Negative for confusion, dysphoric mood, self-injury and suicidal ideas.        Past Medical History:   Diagnosis Date    Allergy     Anemia 3/30/2020    Asthma     Fever blister     Hypertension       Past Surgical History:   Procedure Laterality Date    BILATERAL SALPINGOOPHORECTOMY Bilateral 5/18/2007    Torsion of ovaries and tubes    BREAST BIOPSY Right     (-)    CARDIAC CATHETERIZATION      cardiac stents  01/2017    1 stent    HYSTERECTOMY  1/13/2006    uterus and cervix due to AUB    OOPHORECTOMY      TRIGGER FINGER RELEASE Right 9/8/2020    Procedure: RELEASE, TRIGGER FINGER R-thumb;  Surgeon: Sam Bangura Jr., MD;  [facility];  Service: Orthopedics;  Laterality: Right;       Family History   Problem Relation Age of Onset    Heart disease Mother      Diabetes Mother     Heart disease Father        Social History     Socioeconomic History    Marital status:      Spouse name: Danny    Number of children: 2    Years of education: 12th   Occupational History    Occupation: homemaker   Tobacco Use    Smoking status: Never    Smokeless tobacco: Never   Substance and Sexual Activity    Alcohol use: No    Drug use: No    Sexual activity: Yes     Partners: Male     Birth control/protection: Surgical   Other Topics Concern    Are you pregnant or think you may be? No    Breast-feeding No       Current Outpatient Medications   Medication Sig Dispense Refill    ascorbic acid, vitamin C, (VITAMIN C) 1000 MG tablet Take 1,000 mg by mouth once daily.      aspirin 81 MG Chew Take 1 tablet (81 mg total) by mouth once daily. 1 tablet 0    atorvastatin (LIPITOR) 80 MG tablet Take 1 tablet (80 mg total) by mouth every evening. 90 tablet 3    calcium-vitamin D (OSCAL) 250 (625)-125 mg-unit per tablet Take 1 tablet by mouth once daily.      carvediloL (COREG) 25 MG tablet Take 1 tablet (25 mg total) by mouth 2 (two) times daily with meals. 180 tablet 3    ezetimibe (ZETIA) 10 mg tablet Take 1 tablet (10 mg total) by mouth once daily. 90 tablet 3    fluticasone propionate (FLONASE) 50 mcg/actuation nasal spray 1 spray (50 mcg total) by Each Nostril route 2 (two) times daily. 16 g 1    lisinopriL (PRINIVIL,ZESTRIL) 40 MG tablet Take 1 tablet (40 mg total) by mouth every evening. 90 tablet 3    spironolactone (ALDACTONE) 25 MG tablet Take 1 tablet (25 mg total) by mouth once daily. 90 tablet 3    albuterol (VENTOLIN HFA) 90 mcg/actuation inhaler Inhale 2 puffs into the lungs every 6 (six) hours as needed for Wheezing. Rescue 18 g 2    methocarbamoL (ROBAXIN) 500 MG Tab Take 1 tablet (500 mg total) by mouth nightly as needed (muscle spasm). 40 tablet 0    pantoprazole (PROTONIX) 40 MG tablet Take 1 tablet (40 mg total) by mouth once daily. 90 tablet 1    solifenacin (VESICARE) 10  "MG tablet Take 1 tablet (10 mg total) by mouth once daily. Appointment required for future refills 90 tablet 1    valACYclovir (VALTREX) 1000 MG tablet 2 tablets PO at onset of fever blister. Repeat 12 hours later x1 dose. Repeat for repeat occurrence. 30 tablet 5     No current facility-administered medications for this visit.       Review of patient's allergies indicates:  No Known Allergies  Objective:      Blood pressure 100/70, pulse (!) 58, height 5' 2" (1.575 m), weight 104 kg (229 lb 3.2 oz), SpO2 99 %. Body mass index is 41.92 kg/m².   Physical Exam  Vitals and nursing note reviewed.   Constitutional:       General: She is not in acute distress.     Appearance: Normal appearance. She is well-developed. She is obese. She is not ill-appearing.   HENT:      Head: Normocephalic and atraumatic.      Right Ear: External ear normal.      Left Ear: External ear normal.      Nose: Nose normal.      Mouth/Throat:      Mouth: Mucous membranes are moist.   Eyes:      General: Lids are normal. Lids are everted, no foreign bodies appreciated.      Conjunctiva/sclera: Conjunctivae normal.      Pupils: Pupils are equal, round, and reactive to light.      Right eye: Pupil is round and reactive.      Left eye: Pupil is round and reactive.   Neck:      Trachea: Trachea normal.   Cardiovascular:      Rate and Rhythm: Normal rate and regular rhythm.      Pulses: Normal pulses.      Heart sounds: Normal heart sounds, S1 normal and S2 normal.   Pulmonary:      Effort: Pulmonary effort is normal. No respiratory distress.      Breath sounds: Normal breath sounds.   Abdominal:      General: Abdomen is flat. Bowel sounds are normal.      Palpations: Abdomen is soft. Abdomen is not rigid.      Tenderness: There is no guarding.   Musculoskeletal:      Right shoulder: Normal.      Left shoulder: Normal.      Cervical back: Normal range of motion and neck supple. No muscular tenderness.        Legs:    Lymphadenopathy:      Cervical: No " cervical adenopathy.   Skin:     General: Skin is warm and dry.      Capillary Refill: Capillary refill takes less than 2 seconds.   Neurological:      General: No focal deficit present.      Mental Status: She is alert and oriented to person, place, and time.   Psychiatric:         Mood and Affect: Mood normal.         Behavior: Behavior normal. Behavior is cooperative.         Thought Content: Thought content normal.         Judgment: Judgment normal.             Assessment:       1. Left leg pain    2. Sciatica of left side    3. Chronic bilateral low back pain with left-sided sciatica    4. Urge incontinence of urine    5. Gastroesophageal reflux disease without esophagitis Active   6. Fever blister    7. Mild intermittent asthma with acute exacerbation    8. Muscle spasm    9. Colon cancer screening    10. Class 3 severe obesity due to excess calories with serious comorbidity and body mass index (BMI) of 40.0 to 44.9 in adult        Plan:       Zoila was seen today for back pain, leg pain and nail problem.    Diagnoses and all orders for this visit:    Left leg pain  -     Ambulatory referral/consult to Physical/Occupational Therapy; Future    Sciatica of left side  -     Ambulatory referral/consult to Physical/Occupational Therapy; Future    Chronic bilateral low back pain with left-sided sciatica  -     Ambulatory referral/consult to Physical/Occupational Therapy; Future    Urge incontinence of urine  -     solifenacin (VESICARE) 10 MG tablet; Take 1 tablet (10 mg total) by mouth once daily. Appointment required for future refills    Gastroesophageal reflux disease without esophagitis  -     pantoprazole (PROTONIX) 40 MG tablet; Take 1 tablet (40 mg total) by mouth once daily.    Fever blister  -     valACYclovir (VALTREX) 1000 MG tablet; 2 tablets PO at onset of fever blister. Repeat 12 hours later x1 dose. Repeat for repeat occurrence.    Mild intermittent asthma with acute exacerbation  -     albuterol  (VENTOLIN HFA) 90 mcg/actuation inhaler; Inhale 2 puffs into the lungs every 6 (six) hours as needed for Wheezing. Rescue    Muscle spasm  -     methocarbamoL (ROBAXIN) 500 MG Tab; Take 1 tablet (500 mg total) by mouth nightly as needed (muscle spasm).    Colon cancer screening  -     Cologuard Screening (Multitarget Stool DNA); Future  -     Cologuard Screening (Multitarget Stool DNA)    Class 3 severe obesity due to excess calories with serious comorbidity and body mass index (BMI) of 40.0 to 44.9 in adult  The patient is asked to make an attempt to improve diet and exercise patterns to aid in medical management of this problem.

## 2023-08-15 ENCOUNTER — CLINICAL SUPPORT (OUTPATIENT)
Dept: REHABILITATION | Facility: HOSPITAL | Age: 58
End: 2023-08-15
Payer: MEDICAID

## 2023-08-15 DIAGNOSIS — M79.605 LEFT LEG PAIN: ICD-10-CM

## 2023-08-15 DIAGNOSIS — M54.32 SCIATICA OF LEFT SIDE: ICD-10-CM

## 2023-08-15 DIAGNOSIS — G89.29 CHRONIC BILATERAL LOW BACK PAIN WITH LEFT-SIDED SCIATICA: ICD-10-CM

## 2023-08-15 DIAGNOSIS — R19.8 ABDOMINAL WEAKNESS: ICD-10-CM

## 2023-08-15 DIAGNOSIS — M53.86 DECREASED RANGE OF MOTION OF LUMBAR SPINE: ICD-10-CM

## 2023-08-15 DIAGNOSIS — Z74.09 IMPAIRED MOBILITY: ICD-10-CM

## 2023-08-15 DIAGNOSIS — M54.42 CHRONIC BILATERAL LOW BACK PAIN WITH LEFT-SIDED SCIATICA: ICD-10-CM

## 2023-08-15 PROCEDURE — 97110 THERAPEUTIC EXERCISES: CPT | Mod: PN

## 2023-08-15 PROCEDURE — 97161 PT EVAL LOW COMPLEX 20 MIN: CPT | Mod: PN

## 2023-08-15 NOTE — PROGRESS NOTES
[facility]  Physical Therapy Initial Evaluation    Name: Zoila Moreland  Clinic Number: [id number]    Therapy Diagnosis:   Encounter Diagnoses   Name Primary?    Left leg pain     Sciatica of left side     Chronic bilateral low back pain with left-sided sciatica     Decreased range of motion of lumbar spine     Abdominal weakness     Impaired mobility      Physician: Holli Mancuso FNP-C    Physician Orders: PT Eval and Treat   Medical Diagnosis from Referral:   M79.605 (ICD-10-CM) - Left leg pain   M54.32 (ICD-10-CM) - Sciatica of left side   M54.42,G89.29 (ICD-10-CM) - Chronic bilateral low back pain with left-sided sciatica   Evaluation Date: 8/15/2023  Authorization Period Expiration: 10/1/2023  Plan of Care Expiration: 10/24/2023  Visit # (episodes) / Visits authorized: 1/ eval (POC 0 / 12)  2nd FOTO visit 5  3rd FOTO visit 10  Progress Note Due: 9/15/2023    Time In: 1030  Time Out: 1120  Total Billable Time: 50 minutes    Precautions: Asthma; HTN  Insurance: Payor: MEDICAID / Plan: [facility] COMMUNITY PLAN [facility] ([place] MEDICAID) / Product Type: Managed Medicaid /     Subjective   Date of onset: about a year  History of current condition - Zoila reports: pn has been there for about a year and has gradually gotten worse. At ease right now; low pain level today. Has good and bad days. No known incident; gradual onset. No history of back pain. Has only taken 1 muscle relaxer so far. It has eased up enough to deal with it. Her  suffered a massive heart attack 5 years ago, so pt spends part of her time taking care of him and part of her time taking care of her son's grandkids.      Medical History:   Past Medical History:   Diagnosis Date    Allergy     Anemia 3/30/2020    Asthma     Fever blister     Hypertension        Surgical History:   Zoila Moreland  has a past surgical history that includes Bilateral salpingoophorectomy (Bilateral, 5/18/2007); Hysterectomy  (1/13/2006); cardiac stents (01/2017); Cardiac catheterization; Oophorectomy; Breast biopsy (Right); and Trigger finger release (Right, 9/8/2020).    Medications:   Zoila has a current medication list which includes the following prescription(s): albuterol, ascorbic acid (vitamin c), aspirin, atorvastatin, calcium-vitamin d, carvedilol, ezetimibe, fluticasone propionate, lisinopril, pantoprazole, solifenacin, spironolactone, and valacyclovir.    Allergies:   Review of patient's allergies indicates:  No Known Allergies     Imaging, none in EPIC    Prior Therapy: no  Social History: lives with family; 1-story home  Current Smoker: no   Recent Weight loss: no  Bowel or Bladder Issues: no  Falls in last 6 months: no  Cancer Hx: no  Occupation: stay at home  Prior Level of Function: independent ambulation  Current Level of Function: difficult/pain with walking too much, getting up from sitting, sitting    Pain:  Current 1/10, worst 7/10, best 1/10   Location: Left lumbosacral; wraps around thigh and anterior lower leg  Description: Ache; constant; n/t in lateral thigh  Aggravating Factors: walking too much, getting up from sitting, sitting  Easing Factors: muscles relaxer    Pts goals: be able to walk without hurting    Objective     Posture / IC / ASIS / PSIS:  Medial tilt of right knee; Left ASIS higher; upper sway back posture with decreased lordosis; mod kyphosis and FHP  Palpation: severe TTP left Quadratus Lumborum and glute    Gait Without AD   Analysis Slight left trunk lean     Lumbar AROM: ROM-   Response to repeated movements   Flexion 12 cm from floor - stretch   Extension (15 norm) 5 degrees extension ->10 deg extension   Right side bending  (20 norm) 15 degrees - pull Left    Left side bending  (20 norm) 15 degrees   Rotation Observation WNL     Sensation: NT    L/E MMT: 5/5 Left Right   Hip Flexion 3+/5. 3+/5.   Hip Extension 5/5. 5/5.   Hip Abduction 4/5. 4/5.   Hip Adduction 4/5. 4/5.   Hip IR 5/5.  5/5.   Hip ER 3+/5. 3+/5.   Knee Flexion 4/5. pn 4/5.   Knee Extension 5/5. 5/5.   Ankle DF 5/5. 5/5.   Ankle INV 5/5. 5/5.   Ankle EV 5/5. 5/5.     Hip & LE Range of Motion:    Left  Limited External Rotation  Right  Limited External Rotation      Core strength MMT: 2/5 = unable to assume test position  LLD (Leg length discrepancy) / Supine to Sit: Left short to long = posterior innominate rotation    Flexibility Left Right   Hamstrings 90/90 (-20 norm) -20 degrees -20 degrees   Flexibility: mild / moderate / severe restriction grading  Quadriceps: moderate restriction  Illiopsoas: moderate restriction  Paraspinals: mod to severe thoracic region        TREATMENT   Treatment Time In: 1100  Treatment Time Out: 1120  Total Treatment time separate from Evaluation: 20 minutes    Zoila received therapeutic exercises to develop strength, ROM, and flexibility for 15 minutes including:  NMRE utilized to activate appropriate mm to improve posture, core and lumbar stabilization and lumbopelvic stability: 5 minutes     STAND:  Left Quadratus Lumborum stretch, left foot back, [id number]    TABLE:  Sciatic Nerve Glide, 2x10 Bilateral   Ilan stretch, 30x  Ab brace, [id number]   Piriformis stretch, push away, [id number]  Prone forearm stretch, [id number]      Add NEXT:  Bridge or prone glute squeeze  AB brace with Bent Knee Fall Out  Sidelying clamshells  Prone quad stretch with strap, [id number] Bilateral   Red Theraband lateral steps,   Cable Column series with Ab brace: Retro walk, 13#, 10x / Paloff press, 7#, 20x Bilateral  Kitchen sink stretch, [id number]   Gait training per pt request, utilize mirror in hallway to reduce left trunk lean  FUTURE:  Mechanical lumbar traction  Retest innominates  NOTE: attempted prone press ups, increased her pain      Home Exercises and Patient Education Provided    Education provided:   - daily HEP  - sitting ergonomics  - utilizing heat in mornings, 10-20 minute max; ice if flare-up, 10-20 minute max      Written Home Exercises Provided: yes.  Exercises were reviewed and Zoila was able to demonstrate them prior to the end of the session.  Zoila demonstrated good  understanding of the education provided.     See EMR under Patient Instructions for exercises provided 8/15/2023.    Assessment   Zoila is a 57 y.o. female referred to outpatient Physical Therapy with a medical diagnosis of chronic LBP with sciatica. Pt presents with palpable left Quadratus Lumborum, lumbosacral and glute pain, core, hip and Left Lower Extremity weakness, decreased flexibility of hips and thighs, asymmetrical innominates, high BMI, posture and gait deficits and impaired function.    Pt prognosis is Good.   Pt will benefit from skilled outpatient Physical Therapy to address the deficits stated above and in the chart below, provide pt/family education, and to maximize pt's level of independence.     Plan of care discussed with patient: Yes  Pt's spiritual, cultural and educational needs considered and patient is agreeable to the plan of care and goals as stated below:     Anticipated Barriers for therapy: BMI; chronicity of condition    Medical Necessity is demonstrated by the following  History  Co-morbidities and personal factors that may impact the plan of care Co-morbidities:   Asthma; HTN; high BMI; difficulty sleeping    Personal Factors:   coping style  lifestyle     low   Examination  Body Structures and Functions, activity limitations and participation restrictions that may impact the plan of care Body Regions:   back  lower extremities  trunk    Body Systems:    gross symmetry  ROM  strength  gait  motor control  motor learning  blood pressure  posture    Participation Restrictions:   Chores, community    Activity limitations:   Learning and applying knowledge  no deficits    General Tasks and Commands  no deficits    Communication  no deficits    Mobility  lifting and carrying objects  walking    Self care  no  deficits    Domestic Life  doing house work (cleaning house, washing dishes, laundry)  assisting others    Interactions/Relationships  no deficits    Life Areas  no deficits    Community and Social Life  community life  recreation and leisure         low   Clinical Presentation stable and uncomplicated low   Decision Making/ Complexity Score: low     Goals:  Short Term GOALS: 3 weeks  1. Patient is independent with HEP.   2. Patient demonstrates independence with core activation and postural awareness while sitting and standing.   3. Patient will reduce pn to 2/10 while performing daily activities.  4. Patient will reduce LLE radiculopathy frequency and intensity.     Long Term GOALS: 6 weeks.   1. Patient demonstrates increased l/s SB ROM to 20 degrees from floor to improve tolerance to reaching activities.   2. Patient demonstrates increased core, hip and BLE strength by 1/2 grade or greater to improve tolerance to home chores.  3. Patient demonstrates independence with body mechanics while performing chores.  4. Patient will improve FOTO limitation score to </= 32% to show improvement in condition and functional mobility.     Plan   Plan of care Certification: 8/15/2023 to 10/24/2023.    Outpatient Physical Therapy 2 times weekly for 6 weeks to include the following interventions: Cervical/Lumbar Traction, Electrical Stimulation ,, Gait Training, Manual Therapy, Moist Heat/ Ice, Neuromuscular Re-ed, Orthotic Management and Training, Patient Education, Self Care, Therapeutic Activities, Therapeutic Exercise, and Ultrasound.     Anjelica De Anda, PT

## 2023-08-16 NOTE — PLAN OF CARE
[facility]  Physical Therapy Initial Evaluation    Name: Zoila Moreland  Clinic Number: [id number]    Therapy Diagnosis:   Encounter Diagnoses   Name Primary?    Left leg pain     Sciatica of left side     Chronic bilateral low back pain with left-sided sciatica     Decreased range of motion of lumbar spine     Abdominal weakness     Impaired mobility      Physician: Holli Mancuso FNP-C    Physician Orders: PT Eval and Treat   Medical Diagnosis from Referral:   M79.605 (ICD-10-CM) - Left leg pain   M54.32 (ICD-10-CM) - Sciatica of left side   M54.42,G89.29 (ICD-10-CM) - Chronic bilateral low back pain with left-sided sciatica   Evaluation Date: 8/15/2023  Authorization Period Expiration: 10/1/2023  Plan of Care Expiration: 10/24/2023  Visit # (episodes) / Visits authorized: 1/ eval (POC 0 / 12)  2nd FOTO visit 5  3rd FOTO visit 10  Progress Note Due: 9/15/2023    Time In: 1030  Time Out: 1120  Total Billable Time: 50 minutes    Precautions: Asthma; HTN  Insurance: Payor: MEDICAID / Plan: [facility] COMMUNITY PLAN [facility] ([place]) / Product Type: Managed Medicaid /     Subjective   Date of onset: about a year  History of current condition - Zoila reports: pn has been there for about a year and has gradually gotten worse. At ease right now; low pain level today. Has good and bad days. No known incident; gradual onset. No history of back pain. Has only taken 1 muscle relaxer so far. It has eased up enough to deal with it. Her  suffered a massive heart attack 5 years ago, so pt spends part of her time taking care of him and part of her time taking care of her son's grandkids.      Medical History:   Past Medical History:   Diagnosis Date    Allergy     Anemia 3/30/2020    Asthma     Fever blister     Hypertension        Surgical History:   Zoila Moreland  has a past surgical history that includes Bilateral salpingoophorectomy (Bilateral, 5/18/2007); Hysterectomy  (1/13/2006); cardiac stents (01/2017); Cardiac catheterization; Oophorectomy; Breast biopsy (Right); and Trigger finger release (Right, 9/8/2020).    Medications:   Zoila has a current medication list which includes the following prescription(s): albuterol, ascorbic acid (vitamin c), aspirin, atorvastatin, calcium-vitamin d, carvedilol, ezetimibe, fluticasone propionate, lisinopril, pantoprazole, solifenacin, spironolactone, and valacyclovir.    Allergies:   Review of patient's allergies indicates:  No Known Allergies     Imaging, none in EPIC    Prior Therapy: no  Social History: lives with family; 1-story home  Current Smoker: no   Recent Weight loss: no  Bowel or Bladder Issues: no  Falls in last 6 months: no  Cancer Hx: no  Occupation: stay at home  Prior Level of Function: independent ambulation  Current Level of Function: difficult/pain with walking too much, getting up from sitting, sitting    Pain:  Current 1/10, worst 7/10, best 1/10   Location: Left lumbosacral; wraps around thigh and anterior lower leg  Description: Ache; constant; n/t in lateral thigh  Aggravating Factors: walking too much, getting up from sitting, sitting  Easing Factors: muscles relaxer    Pts goals: be able to walk without hurting    Objective     Posture / IC / ASIS / PSIS:  Medial tilt of right knee; Left ASIS higher; upper sway back posture with decreased lordosis; mod kyphosis and FHP  Palpation: severe TTP left Quadratus Lumborum and glute    Gait Without AD   Analysis Slight left trunk lean     Lumbar AROM: ROM-   Response to repeated movements   Flexion 12 cm from floor - stretch   Extension (15 norm) 5 degrees extension ->10 deg extension   Right side bending  (20 norm) 15 degrees - pull Left    Left side bending  (20 norm) 15 degrees   Rotation Observation WNL     Sensation: NT    L/E MMT: 5/5 Left Right   Hip Flexion 3+/5. 3+/5.   Hip Extension 5/5. 5/5.   Hip Abduction 4/5. 4/5.   Hip Adduction 4/5. 4/5.   Hip IR 5/5.  5/5.   Hip ER 3+/5. 3+/5.   Knee Flexion 4/5. pn 4/5.   Knee Extension 5/5. 5/5.   Ankle DF 5/5. 5/5.   Ankle INV 5/5. 5/5.   Ankle EV 5/5. 5/5.     Hip & LE Range of Motion:    Left  Limited External Rotation  Right  Limited External Rotation      Core strength MMT: 2/5 = unable to assume test position  LLD (Leg length discrepancy) / Supine to Sit: Left short to long = posterior innominate rotation    Flexibility Left Right   Hamstrings 90/90 (-20 norm) -20 degrees -20 degrees   Flexibility: mild / moderate / severe restriction grading  Quadriceps: moderate restriction  Illiopsoas: moderate restriction  Paraspinals: mod to severe thoracic region        TREATMENT   Treatment Time In: 1100  Treatment Time Out: 1120  Total Treatment time separate from Evaluation: 20 minutes    Zoila received therapeutic exercises to develop strength, ROM, and flexibility for 15 minutes including:  NMRE utilized to activate appropriate mm to improve posture, core and lumbar stabilization and lumbopelvic stability: 5 minutes     STAND:  Left Quadratus Lumborum stretch, left foot back, [id number]    TABLE:  Sciatic Nerve Glide, 2x10 Bilateral   Ilan stretch, 30x  Ab brace, 72c0yho   Piriformis stretch, push away, 9r53qon  Prone forearm stretch, 2h65lvj      Add NEXT:  Bridge or prone glute squeeze  AB brace with Bent Knee Fall Out  Sidelying clamshells  Prone quad stretch with strap, 6v82xda Bilateral   Red Theraband lateral steps,   Cable Column series with Ab brace: Retro walk, 13#, 10x / Paloff press, 7#, 20x Bilateral  Kitchen sink stretch, 9k10umg   Gait training per pt request, utilize mirror in hallway to reduce left trunk lean  FUTURE:  Mechanical lumbar traction  Retest innominates  NOTE: attempted prone press ups, increased her pain      Home Exercises and Patient Education Provided    Education provided:   - daily HEP  - sitting ergonomics  - utilizing heat in mornings, 10-20 minute max; ice if flare-up, 10-20 minute max      Written Home Exercises Provided: yes.  Exercises were reviewed and Zoila was able to demonstrate them prior to the end of the session.  Zoila demonstrated good  understanding of the education provided.     See EMR under Patient Instructions for exercises provided 8/15/2023.    Assessment   Zoila is a 57 y.o. female referred to outpatient Physical Therapy with a medical diagnosis of chronic LBP with sciatica. Pt presents with palpable left Quadratus Lumborum, lumbosacral and glute pain, core, hip and Left Lower Extremity weakness, decreased flexibility of hips and thighs, asymmetrical innominates, high BMI, posture and gait deficits and impaired function.    Pt prognosis is Good.   Pt will benefit from skilled outpatient Physical Therapy to address the deficits stated above and in the chart below, provide pt/family education, and to maximize pt's level of independence.     Plan of care discussed with patient: Yes  Pt's spiritual, cultural and educational needs considered and patient is agreeable to the plan of care and goals as stated below:     Anticipated Barriers for therapy: BMI; chronicity of condition    Medical Necessity is demonstrated by the following  History  Co-morbidities and personal factors that may impact the plan of care Co-morbidities:   Asthma; HTN; high BMI; difficulty sleeping    Personal Factors:   coping style  lifestyle     low   Examination  Body Structures and Functions, activity limitations and participation restrictions that may impact the plan of care Body Regions:   back  lower extremities  trunk    Body Systems:    gross symmetry  ROM  strength  gait  motor control  motor learning  blood pressure  posture    Participation Restrictions:   Chores, community    Activity limitations:   Learning and applying knowledge  no deficits    General Tasks and Commands  no deficits    Communication  no deficits    Mobility  lifting and carrying objects  walking    Self care  no  deficits    Domestic Life  doing house work (cleaning house, washing dishes, laundry)  assisting others    Interactions/Relationships  no deficits    Life Areas  no deficits    Community and Social Life  community life  recreation and leisure         low   Clinical Presentation stable and uncomplicated low   Decision Making/ Complexity Score: low     Goals:  Short Term GOALS: 3 weeks  1. Patient is independent with HEP.   2. Patient demonstrates independence with core activation and postural awareness while sitting and standing.   3. Patient will reduce pn to 2/10 while performing daily activities.  4. Patient will reduce LLE radiculopathy frequency and intensity.     Long Term GOALS: 6 weeks.   1. Patient demonstrates increased l/s SB ROM to 20 degrees from floor to improve tolerance to reaching activities.   2. Patient demonstrates increased core, hip and BLE strength by 1/2 grade or greater to improve tolerance to home chores.  3. Patient demonstrates independence with body mechanics while performing chores.  4. Patient will improve FOTO limitation score to </= 32% to show improvement in condition and functional mobility.     Plan   Plan of care Certification: 8/15/2023 to 10/24/2023.    Outpatient Physical Therapy 2 times weekly for 6 weeks to include the following interventions: Cervical/Lumbar Traction, Electrical Stimulation ,, Gait Training, Manual Therapy, Moist Heat/ Ice, Neuromuscular Re-ed, Orthotic Management and Training, Patient Education, Self Care, Therapeutic Activities, Therapeutic Exercise, and Ultrasound.     Anjelica De Anda, PT

## 2023-08-21 NOTE — PROGRESS NOTES
"  [facility]   Physical Therapy Treatment Note      Name: Zoila Moreland  Clinic Number: [id number]    Therapy Diagnosis: No diagnosis found.  Physician: Holli Mancuso FNP-C    Visit Date: 8/22/2023    Physician Orders: PT Eval and Treat   Medical Diagnosis from Referral:   M79.605 (ICD-10-CM) - Left leg pain   M54.32 (ICD-10-CM) - Sciatica of left side   M54.42,G89.29 (ICD-10-CM) - Chronic bilateral low back pain with left-sided sciatica   Evaluation Date: 8/15/2023  Authorization Period Expiration: 10/1/2023  Plan of Care Expiration: 10/24/2023  Visit # (episodes) / Visits authorized: 2/ eval+20 (POC 1 / 12)  2nd FOTO visit 5  3rd FOTO visit 10  Progress Note Due: 9/15/2023    PTA Visit #: 1/5     Time in:  Time out:  Total billable minutes:     Precautions: Asthma; HTN  Insurance: Payor: MEDICAID / Plan: [facility] ([place] MED[place]) / Product Type: Managed Medicaid /       Subjective     Pt reports: ***.  She {Actions; was/was not:93376} compliant with home exercise program.  Response to previous treatment: ***  Functional change: ***    Pain: {0-10:67005::"0"}/10  Location: {RIGHT/LEFT/BILATERAL:38339} {LOCATION ON BODY:55064}     Objective      Objective Measures updated at progress report unless specified.     Treatment     Zoila received the treatments listed below:      Zoila received therapeutic exercises to develop strength, ROM, and flexibility for 15 minutes including:  NMRE utilized to activate appropriate mm to improve posture, core and lumbar stabilization and lumbopelvic stability: 5 minutes      STAND:  Left Quadratus Lumborum stretch, left foot back, 6r18eei     TABLE:  Sciatic Nerve Glide, 2x10 Bilateral   Ilan stretch, 30x  Ab brace, 44x7ywe   Piriformis stretch, push away, 1x40mno  Prone forearm stretch, 3c82kck        Add NEXT:  Bridge or prone glute squeeze  AB brace with Bent Knee Fall Out  Sidelying clamshells  Prone quad stretch " with strap, 7i33zvd Bilateral   Red Theraband lateral steps,   Cable Column series with Ab brace: Retro walk, 13#, 10x / Paloff press, 7#, 20x Bilateral  Kitchen sink stretch, 5i60dkq   Gait training per pt request, utilize mirror in hallway to reduce left trunk lean    FUTURE:  Mechanical lumbar traction  Retest innominates  NOTE: attempted prone press ups, increased her pain    Patient Education and Home Exercises       Education provided:   - home exercises     Written Home Exercises Provided: Patient instructed to cont prior HEP. Exercises were reviewed and Zoila was able to demonstrate them prior to the end of the session.  Zoila demonstrated good  understanding of the education provided. See EMR under Patient Instructions for exercises provided during therapy sessions    Assessment     ***    Zoila Is progressing well towards her goals.   Pt prognosis is Good.     Pt will continue to benefit from skilled outpatient physical therapy to address the deficits listed in the problem list box on initial evaluation, provide pt/family education and to maximize pt's level of independence in the home and community environment.     Pt's spiritual, cultural and educational needs considered and pt agreeable to plan of care and goals.     Anticipated barriers to physical therapy: chronicity of symptoms     Goals:   Short Term GOALS: 3 weeks Ongoing 8/22/2023   1. Patient is independent with HEP.   2. Patient demonstrates independence with core activation and postural awareness while sitting and standing.   3. Patient will reduce pn to 2/10 while performing daily activities.  4. Patient will reduce LLE radiculopathy frequency and intensity.     Long Term GOALS: 6 weeks. Ongoing 8/22/2023   1. Patient demonstrates increased l/s SB ROM to 20 degrees from floor to improve tolerance to reaching activities.   2. Patient demonstrates increased core, hip and BLE strength by 1/2 grade or greater to improve tolerance to home  chores.  3. Patient demonstrates independence with body mechanics while performing chores.  4. Patient will improve FOTO limitation score to </= 32% to show improvement in condition and functional mobility.     Plan     Continue per Plan of Care     Leila Santos, PTA

## 2023-08-22 ENCOUNTER — CLINICAL SUPPORT (OUTPATIENT)
Dept: REHABILITATION | Facility: HOSPITAL | Age: 58
End: 2023-08-22
Payer: MEDICAID

## 2023-08-22 DIAGNOSIS — M53.86 DECREASED RANGE OF MOTION OF LUMBAR SPINE: Primary | ICD-10-CM

## 2023-08-22 DIAGNOSIS — R19.8 ABDOMINAL WEAKNESS: ICD-10-CM

## 2023-08-22 DIAGNOSIS — Z74.09 IMPAIRED MOBILITY: ICD-10-CM

## 2023-08-22 PROCEDURE — 97110 THERAPEUTIC EXERCISES: CPT | Mod: PN

## 2023-08-22 NOTE — PROGRESS NOTES
[facility]   Physical Therapy Treatment Note      Name: Zoila [facility]  Clinic Number: [id number]    Therapy Diagnosis:   Encounter Diagnoses   Name Primary?    Decreased range of motion of lumbar spine Yes    Abdominal weakness     Impaired mobility      Physician: Holli Mancuso FNP-C    Visit Date: 8/22/2023    Physician Orders: PT Eval and Treat   Medical Diagnosis from Referral:   M79.605 (ICD-10-CM) - Left leg pain   M54.32 (ICD-10-CM) - Sciatica of left side   M54.42,G89.29 (ICD-10-CM) - Chronic bilateral low back pain with left-sided sciatica   Evaluation Date: 8/15/2023  Authorization Period Expiration: 10/1/2023  Plan of Care Expiration: 10/24/2023  Visit # (episodes) / Visits authorized: 1/ eval + 20(POC 0 / 12)  2nd FOTO visit 5  3rd FOTO visit 12  Progress Note Due: 9/15/2023     Time In: 1000  Time Out: 1055  Total Billable Time: 55 minutes     Precautions: Asthma; HTN    Insurance: Payor: MEDICAID / Plan: [facility] COMMUNITY PLAN [facility] [employer] ([place] MEDICA[place]) / Product Type: Managed Medicaid /     Subjective     Pt reports: she ended up getting a knot on her right side but it's gone away. Did HEP everyday; still feeling the same if not a little higher pain level.     She was compliant with home exercise program.  Response to previous treatment: positive  Functional change: none reported    Pain: 3/10  Location: Left lumbosacral; wraps around thigh and anterior lower leg    Objective      Objective Measures updated at progress report unless specified.     Treatment     Zoila received the treatments listed below:      Zoila received therapeutic exercises to develop posture strength, ROM, core and lumbar stabilization, and flexibility for 55 minutes including:  ALL INTERVENTIONS BILLED AS THEREX PER MEDICAID GUIDELINES       STAND:  LEFT only Quadratus Lumborum stretch, left foot back, 0b72bep  Kitchen sink posterior chain stretch, 8r09ugx      TABLE:  Lumbar rotations,  10x  Sidelying Open books, 10x Bilateral, Foam Roller b/w knees to reduce SI joint pull  Ab brace, 30b7plg  AB brace with Bent Knee Fall Out, 2x10 Bilateral   Hip Adduction squeeze with ab brace, 20-30x  Bridge, 10-20x  Sciatic Nerve Glide, 2x10 Bilateral   Piriformis stretch, push away, 2x21asl Bilateral   Ilan stretch, 5t16zcj Bilateral   Prone forearm stretch, 9c33kpj  Prone Knee flexion, 2x10 Bilateral     Gait training per pt request:   March (even Weight Bearing) with upright posture, airex, 1 minute  Stagger: 1. Weight Bearing in mid stance / 2. Roll through push off, Bilateral, 30 sec ea  Ambulate: utilize mirror in hallway to reduce left trunk lean        Add NEXT:  [employer]  Hip hikes  Sidelying clamshells  Prone quad stretch with strap, 5d77zjd Bilateral   Red Theraband lateral steps, cue toes forward  Cable Column series with Ab brace: Retro walk, 13#, 10x / Paloff press, 7#, 20x Bilateral  Donkey kicks - prone or shuttle  FUTURE:  Mechanical lumbar traction - cleared  Retest innominates: Left short to long = posterior innominate rotation in evaluation  Manual tx: Myofascial and TP Release to muscles surrounding Left SI joint     NOTE: attempted prone press ups, increased her pain       Patient Education and Home Exercises       Education provided:   - daily HEP    Written Home Exercises Provided: Patient instructed to cont prior HEP. Exercises were reviewed and Zoila was able to demonstrate them prior to the end of the session.  Zoila demonstrated good  understanding of the education provided. See EMR under Patient Instructions for exercises provided during therapy sessions    Assessment     Pt demonstrates same pain levels, radiculopathy and slow pace transitioning on table. Pt able to perform all therex; good at communicating triggers of radiculopathy. Focused on spine mobility, core stabilization and sacrum mobility. Continue to progress as tolerated.     Zoila Is progressing well towards her goals.    Pt prognosis is Good.     Pt will continue to benefit from skilled outpatient physical therapy to address the deficits listed in the problem list box on initial evaluation, provide pt/family education and to maximize pt's level of independence in the home and community environment.     Pt's spiritual, cultural and educational needs considered and pt agreeable to plan of care and goals.     Anticipated barriers to physical therapy: BMI; chronicity    Goals:   Short Term GOALS: 3 weeks  1. Patient is independent with HEP.   2. Patient demonstrates independence with core activation and postural awareness while sitting and standing.   3. Patient will reduce pn to 2/10 while performing daily activities.  4. Patient will reduce LLE radiculopathy frequency and intensity.     Long Term GOALS: 6 weeks.   1. Patient demonstrates increased l/s SB ROM to 20 degrees from floor to improve tolerance to reaching activities.   2. Patient demonstrates increased core, hip and BLE strength by 1/2 grade or greater to improve tolerance to home chores.  3. Patient demonstrates independence with body mechanics while performing chores.  4. Patient will improve FOTO limitation score to </= 32% to show improvement in condition and functional mobility.     Plan     Continue PT as deemed per POC: weight loss, spine mobility, core stabilization    Anjelica De Anda, PT

## 2023-08-24 ENCOUNTER — CLINICAL SUPPORT (OUTPATIENT)
Dept: REHABILITATION | Facility: HOSPITAL | Age: 58
End: 2023-08-24
Payer: MEDICAID

## 2023-08-24 DIAGNOSIS — M53.86 DECREASED RANGE OF MOTION OF LUMBAR SPINE: Primary | ICD-10-CM

## 2023-08-24 DIAGNOSIS — Z74.09 IMPAIRED MOBILITY: ICD-10-CM

## 2023-08-24 DIAGNOSIS — R19.8 ABDOMINAL WEAKNESS: ICD-10-CM

## 2023-08-24 LAB — NONINV COLON CA DNA+OCC BLD SCRN STL QL: POSITIVE

## 2023-08-24 PROCEDURE — 97110 THERAPEUTIC EXERCISES: CPT | Mod: PN

## 2023-08-24 NOTE — PROGRESS NOTES
[facility]   Physical Therapy Treatment Note      Name: Zoila Howard Doucet  Clinic Number: [id number]    Therapy Diagnosis:   Encounter Diagnoses   Name Primary?    Decreased range of motion of lumbar spine Yes    Abdominal weakness     Impaired mobility      Physician: Holli Mancuso FNP-C    Visit Date: 8/24/2023    Physician Orders: PT Eval and Treat   Medical Diagnosis from Referral:   M79.605 (ICD-10-CM) - Left leg pain   M54.32 (ICD-10-CM) - Sciatica of left side   M54.42,G89.29 (ICD-10-CM) - Chronic bilateral low back pain with left-sided sciatica   Evaluation Date: 8/15/2023  Authorization Period Expiration: 10/1/2023  Plan of Care Expiration: 10/24/2023  Visit # (episodes) / Visits authorized: 2/ eval + 20(POC 1 / 12)  2nd FOTO visit 5  3rd FOTO visit 12  Progress Note Due: 9/15/2023     Time In: 1000  Time Out: 1055  Total Billable Time: 55 minutes     Precautions: Asthma; HTN    Insurance: Payor: MEDICAID / Plan: [facility]UN[facility] PLAN [facility] [place]) / Product Type: Managed Medicaid /     Subjective     Pt reports: she is feeling about the same.     She was compliant with home exercise program.  Response to previous treatment: positive  Functional change: none reported    Pain: 3/10  Location: Left lumbosacral; wraps around thigh and anterior lower leg    Objective      Objective Measures updated at progress report unless specified.     Treatment     Zoila received the treatments listed below:      Zoila received therapeutic exercises to develop posture strength, ROM, core and lumbar stabilization, and flexibility for 55 minutes including:  ALL INTERVENTIONS BILLED AS THEREX PER MEDICAID GUIDELINES       STAND:  LEFT only Quadratus Lumborum stretch, left foot back, 7i27kyx  Kitchen sink posterior chain stretch, 6u15xof    LSU 2 x 10   Hip Hike x 10 each   GS slant Board stretch 3 x 30 seconds      TABLE:  Lumbar rotations, 10x  Sidelying Open books, 10x  Bilateral, Foam Roller b/w knees to reduce SI joint pull  Sidelying clamshells x 20 Red TheraBand   Ab brace, 70l5vuc  AB brace with Bent Knee Fall Out, 2x10 Bilateral   Hip Adduction squeeze with ab brace, 20-30x  Bridge, 10-20x  Sciatic Nerve Glide, 2x10 Bilateral   Piriformis stretch, push away, 7b35zye Bilateral   Ilan stretch, 3i16dxz Bilateral   Prone forearm stretch, 1n99opu  Prone Knee flexion, 2x10 Bilateral   Prone Hip Extension     Gait training per pt request:   March (even Weight Bearing) with upright posture, airex, 1 minute  Stagger: 1. Weight Bearing in mid stance / 2. Roll through push off, Bilateral, 30 sec ea  Ambulate: utilize mirror in hallway to reduce left trunk lean        Add NEXT:  Prone quad stretch with strap, 3k03yft Bilateral   Red Theraband lateral steps, cue toes forward  Cable Column series with Ab brace: Retro walk, 13#, 10x / Paloff press, 7#, 20x Bilateral  Donkey kicks - prone or shuttle  FUTURE:  Mechanical lumbar traction - cleared  Retest innominates: Left short to long = posterior innominate rotation in evaluation  Manual tx: Myofascial and TP Release to muscles surrounding Left SI joint     NOTE: attempted prone press ups, increased her pain       Patient Education and Home Exercises       Education provided:   - daily HEP    Written Home Exercises Provided: Patient instructed to cont prior HEP. Exercises were reviewed and Zoila was able to demonstrate them prior to the end of the session.  Zoila demonstrated good  understanding of the education provided. See EMR under Patient Instructions for exercises provided during therapy sessions    Assessment     Pt demonstrates same pain levels, radiculopathy and slow pace transitioning on table. Pt able to perform all therex; was able to tolerate kinjal more advanced activity to help address her stability, no increase in symptoms. Focused on spine mobility, core stabilization and sacrum mobility. Continue to progress as tolerated.      Zoila Is progressing well towards her goals.   Pt prognosis is Good.     Pt will continue to benefit from skilled outpatient physical therapy to address the deficits listed in the problem list box on initial evaluation, provide pt/family education and to maximize pt's level of independence in the home and community environment.     Pt's spiritual, cultural and educational needs considered and pt agreeable to plan of care and goals.     Anticipated barriers to physical therapy: BMI; chronicity    Goals:   Short Term GOALS: 3 weeks  1. Patient is independent with HEP.   2. Patient demonstrates independence with core activation and postural awareness while sitting and standing.   3. Patient will reduce pn to 2/10 while performing daily activities.  4. Patient will reduce LLE radiculopathy frequency and intensity.     Long Term GOALS: 6 weeks.   1. Patient demonstrates increased l/s SB ROM to 20 degrees from floor to improve tolerance to reaching activities.   2. Patient demonstrates increased core, hip and BLE strength by 1/2 grade or greater to improve tolerance to home chores.  3. Patient demonstrates independence with body mechanics while performing chores.  4. Patient will improve FOTO limitation score to </= 32% to show improvement in condition and functional mobility.     Plan     Continue PT as deemed per POC: weight loss, spine mobility, core stabilization    Jesus Chavis, PT

## 2023-08-28 ENCOUNTER — PATIENT MESSAGE (OUTPATIENT)
Dept: FAMILY MEDICINE | Facility: CLINIC | Age: 58
End: 2023-08-28

## 2023-08-29 ENCOUNTER — CLINICAL SUPPORT (OUTPATIENT)
Dept: REHABILITATION | Facility: HOSPITAL | Age: 58
End: 2023-08-29
Payer: MEDICAID

## 2023-08-29 DIAGNOSIS — Z74.09 IMPAIRED MOBILITY: ICD-10-CM

## 2023-08-29 DIAGNOSIS — R19.8 ABDOMINAL WEAKNESS: ICD-10-CM

## 2023-08-29 DIAGNOSIS — M53.86 DECREASED RANGE OF MOTION OF LUMBAR SPINE: Primary | ICD-10-CM

## 2023-08-29 PROCEDURE — 97110 THERAPEUTIC EXERCISES: CPT | Mod: PN

## 2023-08-29 NOTE — PROGRESS NOTES
[facility]   Physical Therapy Treatment Note      Name: Zoila Howard Doucet  Clinic Number: [id number]    Therapy Diagnosis:   Encounter Diagnoses   Name Primary?    Decreased range of motion of lumbar spine Yes    Abdominal weakness     Impaired mobility      Physician: Holli Mancuso FNP-C    Visit Date: 8/29/2023    Physician Orders: PT Eval and Treat   Medical Diagnosis from Referral:   M79.605 (ICD-10-CM) - Left leg pain   M54.32 (ICD-10-CM) - Sciatica of left side   M54.42,G89.29 (ICD-10-CM) - Chronic bilateral low back pain with left-sided sciatica   Evaluation Date: 8/15/2023  Authorization Period Expiration: 10/1/2023  Plan of Care Expiration: 10/24/2023  Visit # (episodes) / Visits authorized: 4 / eval + 20 (POC 3 / 12)  2nd FOTO visit 5  3rd FOTO visit 12  Progress Note Due: 9/15/2023     Time In: 1000  Time Out: 1055  Total Billable Time: 55 minutes     Precautions: Asthma; HTN    Insurance: Payor: MEDICAID / Plan: [facility] COMMUNITY PLAN [facility] [employer] ([place] MEDICA[place]) / Product Type: Managed Medicaid /     Subjective     Pt reports: she is feeling about the same.     She was compliant with home exercise program.  Response to previous treatment: positive  Functional change: none reported    Pain: 3/10  Location: Left lumbosacral; wraps around thigh and anterior lower leg    Objective      Left short to long = posterior innominate    Treatment     Zoila received the treatments listed below:      Zoila received therapeutic exercises to develop posture strength, ROM, core and lumbar stabilization, and flexibility for 55 minutes including:  ALL INTERVENTIONS BILLED AS THEREX PER MEDICAID GUIDELINES       TABLE:  MET: Left Hip Flexion / Right Hip Extension   Lumbar rotations, 10x  Sidelying Open books, 10x Bilateral, Foam Roller b/w knees to reduce SI joint pull  Ab brace, 22e9awm  Red Theraband AB brace with Bent Knee Fall Out, 2x10 Bilateral   Red Theraband Bridge,  10-20x  Sidelying clamshells x 20 Red TheraBand   Hip Adduction squeeze with ab brace, 20-30x  Sciatic Nerve Glide, 20x Bilateral   Piriformis stretch, push away, 8l26nun Bilateral   Ilan stretch, 6a80dqj Bilateral   Prone forearm stretch, 6m89upc  +Prone quad stretch with strap, 5r27qhe Bilateral   Prone Donkey kicks, 2x10 Bilateral   +Quad Rocking, 66g41hzf    Gait training per pt request:   March (even Weight Bearing) with upright posture, airex, 1 minute  Stagger: 1. Weight Bearing in mid stance / 2. Roll through push off, Bilateral, 30 sec ea  Ambulate: utilize mirror in hallway to reduce left trunk lean, 3 trials    Manual tx: Myofascial Release to Bilateral glutes    STAND: NOT PERFORMED   LSU 2 x 10   Hip Hike x 10 each   GS slant Board stretch 3 x 30 seconds  LEFT only Quadratus Lumborum stretch, left foot back, 8z71zwv  Kitchen sink posterior chain stretch, 8s16hvd          Add NEXT: reduce spine mobility and stretching as core stab is added  Red Theraband lateral steps, cue toes forward  Cable Column series with Ab brace: Retro walk, 13#, 10x / Paloff press, 7#, 20x Bilateral  FUTURE:  Mechanical lumbar traction - cleared    NOTE: attempted prone press ups, increased her pain       Patient Education and Home Exercises       Education provided:   - daily HEP    Written Home Exercises Provided: Patient instructed to cont prior HEP. Exercises were reviewed and Zoila was able to demonstrate them prior to the end of the session.  Zoila demonstrated good  understanding of the education provided. See EMR under Patient Instructions for exercises provided during therapy sessions    Assessment     Pt demonstrates same pain levels, radiculopathy and slow pace transitioning on table. Pt able to perform all therex with minimal pn provocation. Utilized manual tx to decrease muscle stiffness in glutes; low tolerance for manual tx. Focused on spine mobility, core stabilization and sacrum mobility. Continue to  progress as tolerated.     Zoila Is progressing well towards her goals.   Pt prognosis is Good.     Pt will continue to benefit from skilled outpatient physical therapy to address the deficits listed in the problem list box on initial evaluation, provide pt/family education and to maximize pt's level of independence in the home and community environment.     Pt's spiritual, cultural and educational needs considered and pt agreeable to plan of care and goals.     Anticipated barriers to physical therapy: BMI; chronicity    Goals:   Short Term GOALS: 3 weeks  1. Patient is independent with HEP.   2. Patient demonstrates independence with core activation and postural awareness while sitting and standing.   3. Patient will reduce pn to 2/10 while performing daily activities.  4. Patient will reduce LLE radiculopathy frequency and intensity.     Long Term GOALS: 6 weeks.   1. Patient demonstrates increased l/s SB ROM to 20 degrees from floor to improve tolerance to reaching activities.   2. Patient demonstrates increased core, hip and BLE strength by 1/2 grade or greater to improve tolerance to home chores.  3. Patient demonstrates independence with body mechanics while performing chores.  4. Patient will improve FOTO limitation score to </= 32% to show improvement in condition and functional mobility.     Plan     Continue PT as deemed per POC: weight loss, spine mobility, core stabilization    Anjelica De Anda, PT

## 2023-08-31 ENCOUNTER — CLINICAL SUPPORT (OUTPATIENT)
Dept: REHABILITATION | Facility: HOSPITAL | Age: 58
End: 2023-08-31
Payer: MEDICAID

## 2023-08-31 DIAGNOSIS — R19.8 ABDOMINAL WEAKNESS: ICD-10-CM

## 2023-08-31 DIAGNOSIS — M53.86 DECREASED RANGE OF MOTION OF LUMBAR SPINE: Primary | ICD-10-CM

## 2023-08-31 DIAGNOSIS — Z74.09 IMPAIRED MOBILITY: ICD-10-CM

## 2023-08-31 PROCEDURE — 97110 THERAPEUTIC EXERCISES: CPT | Mod: PN,CQ

## 2023-08-31 NOTE — PROGRESS NOTES
[facility]   Physical Therapy Treatment Note      Name: Zoila Howard Doucet  Clinic Number: [id number]    Therapy Diagnosis:   Encounter Diagnoses   Name Primary?    Decreased range of motion of lumbar spine Yes    Abdominal weakness     Impaired mobility      Physician: Holli Mancuso FNP-C    Visit Date: 8/31/2023    Physician Orders: PT Eval and Treat   Medical Diagnosis from Referral:   M79.605 (ICD-10-CM) - Left leg pain   M54.32 (ICD-10-CM) - Sciatica of left side   M54.42,G89.29 (ICD-10-CM) - Chronic bilateral low back pain with left-sided sciatica   Evaluation Date: 8/15/2023  Authorization Period Expiration: 10/1/2023  Plan of Care Expiration: 10/24/2023  Visit # (episodes) / Visits authorized: 5 / eval + 20 (POC 4 / 12)  2nd FOTO visit 5  3rd FOTO visit 12  Progress Note Due: 9/15/2023     Time In: 1000  Time Out: 1055  Total Billable Time: 55 minutes     Precautions: Asthma; HTN    Insurance: Payor: MEDICAID / Plan: [place] COMMUNITY PLAN [facility] [employer] ([place] MEDICA[place]) / Product Type: Managed Medicaid /     Subjective     Pt reports: she is doing well upon arrival.   She was compliant with home exercise program.  Response to previous treatment: no complaints   Functional change: none reported    Pain: 2/10  Location: Left lumbosacral; wraps around thigh and anterior lower leg    Objective      Left short to long = posterior innominate    Treatment     Zoila received the treatments listed below:      Zoila received therapeutic exercises to develop posture strength, ROM, core and lumbar stabilization, and flexibility for 55 minutes including:  ALL INTERVENTIONS BILLED AS THEREX PER MEDICAID GUIDELINES       TABLE:  MET: Left Hip Flexion / Right Hip Extension   Lumbar rotations, 10x  Sidelying Open books, 10x Bilateral, Foam Roller b/w knees to reduce SI joint pull  Ab brace, 67t8hwc  Red Theraband AB brace with Bent Knee Fall Out, 2x10 Bilateral   Red Theraband Bridge,  10-20x  Sidelying clamshells x 20 Red TheraBand   Hip Adduction squeeze with ab brace, 30x  Sciatic Nerve Glide, 20x Bilateral   Piriformis stretch, push away, 4u62gbv Bilateral   Ilan stretch, 8v00kjv Bilateral   Prone forearm stretch, 6v45kru  Prone quad stretch with strap, 8j58tam Bilateral   Prone Donkey kicks, 2x10 Bilateral   Quad Rocking, 78f51dia    Kitchen sink posterior chain stretch, 6g71ysn   Cable column: retro walks 13# x 10   Cable column: palloff press     Gait training per pt request:   March (even Weight Bearing) with upright posture, airex, 1 minute  Stagger: 1. Weight Bearing in mid stance / 2. Roll through push off, Bilateral, 30 sec ea  Ambulate: utilize mirror in hallway to reduce left trunk lean, 3 trials    Manual tx: Myofascial Release to Bilateral glutes    STAND: NOT PERFORMED   LSU 2 x 10   Hip Hike x 10 each   GS slant Board stretch 3 x 30 seconds  LEFT only Quadratus Lumborum stretch, left foot back, 0h74jec     Add NEXT: reduce spine mobility and stretching as core stab is added  Red Theraband lateral steps, cue toes forward  Cable Column series with Ab brace: Retro walk, 13#, 10x / Paloff press, 7#, 20x Bilateral    FUTURE:  Mechanical lumbar traction - cleared    NOTE: attempted prone press ups, increased her pain       Patient Education and Home Exercises       Education provided:   - daily HEP    Written Home Exercises Provided: Patient instructed to cont prior HEP. Exercises were reviewed and Zoila was able to demonstrate them prior to the end of the session.  Zoila demonstrated good  understanding of the education provided. See EMR under Patient Instructions for exercises provided during therapy sessions    Assessment     Pt demonstrates same pain levels, radiculopathy and slow pace transitioning on table. Pt able to perform all therex with minimal pn provocation. Focused on spine mobility, core stabilization and sacrum mobility. Continue to progress as tolerated.     Zoila  Is progressing well towards her goals.   Pt prognosis is Good.     Pt will continue to benefit from skilled outpatient physical therapy to address the deficits listed in the problem list box on initial evaluation, provide pt/family education and to maximize pt's level of independence in the home and community environment.     Pt's spiritual, cultural and educational needs considered and pt agreeable to plan of care and goals.     Anticipated barriers to physical therapy: BMI; chronicity    Goals:   Short Term GOALS: 3 weeks  1. Patient is independent with HEP.   2. Patient demonstrates independence with core activation and postural awareness while sitting and standing.   3. Patient will reduce pn to 2/10 while performing daily activities.  4. Patient will reduce LLE radiculopathy frequency and intensity.     Long Term GOALS: 6 weeks.   1. Patient demonstrates increased l/s SB ROM to 20 degrees from floor to improve tolerance to reaching activities.   2. Patient demonstrates increased core, hip and BLE strength by 1/2 grade or greater to improve tolerance to home chores.  3. Patient demonstrates independence with body mechanics while performing chores.  4. Patient will improve FOTO limitation score to </= 32% to show improvement in condition and functional mobility.     Plan     Continue PT as deemed per POC: weight loss, spine mobility, core stabilization    Leila Santos, PTA

## 2023-09-05 ENCOUNTER — CLINICAL SUPPORT (OUTPATIENT)
Dept: REHABILITATION | Facility: HOSPITAL | Age: 58
End: 2023-09-05
Payer: MEDICAID

## 2023-09-05 DIAGNOSIS — Z74.09 IMPAIRED MOBILITY: ICD-10-CM

## 2023-09-05 DIAGNOSIS — R19.8 ABDOMINAL WEAKNESS: ICD-10-CM

## 2023-09-05 DIAGNOSIS — M53.86 DECREASED RANGE OF MOTION OF LUMBAR SPINE: Primary | ICD-10-CM

## 2023-09-05 PROCEDURE — 97110 THERAPEUTIC EXERCISES: CPT | Mod: PN

## 2023-09-05 NOTE — PROGRESS NOTES
[facility]   Physical Therapy Treatment Note      Name: Zoila Howard Doucet  Clinic Number: [id number]    Therapy Diagnosis:   Encounter Diagnoses   Name Primary?    Decreased range of motion of lumbar spine Yes    Abdominal weakness     Impaired mobility      Physician: Holli Mancuso FNP-C    Visit Date: 9/5/2023    Physician Orders: PT Eval and Treat   Medical Diagnosis from Referral:   M79.605 (ICD-10-CM) - Left leg pain   M54.32 (ICD-10-CM) - Sciatica of left side   M54.42,G89.29 (ICD-10-CM) - Chronic bilateral low back pain with left-sided sciatica   Evaluation Date: 8/15/2023  Authorization Period Expiration: 10/1/2023  Plan of Care Expiration: 10/24/2023  Visit # (episodes) / Visits authorized: 6 / eval + 20 (POC 5 / 12)  2nd FOTO visit 5  3rd FOTO visit 12  Progress Note Due: 9/15/2023     Time In: 1030  Time Out: 1125  Total Billable Time: 55 minutes     Precautions: Asthma; HTN    Insurance: Payor: MEDICAID / Plan: [facility] COMMUNITY PLAN [facility] ([place] MEDICAID) / Product Type: Managed Medicaid /     Subjective     Pt reports: she is doing well upon arrival.     She was compliant with home exercise program.  Response to previous treatment: no complaints   Functional change: none reported    Pain: 2/10  Location: Left lumbosacral; wraps around thigh and anterior lower leg    Objective      Left short to long = posterior innominate    Treatment     Zoila received the treatments listed below:      Zoila received therapeutic exercises to develop posture strength, ROM, core and lumbar stabilization, and flexibility for 55 minutes including:  ALL INTERVENTIONS BILLED AS THEREX PER MEDICAID GUIDELINES       TABLE:  MET: Left Hip Flexion / Right Hip Extension, 5x8sec - neg today   Lumbar rotations, 10x  Sidelying Open books, 10x Bilateral, Foam Roller b/w knees to reduce SI joint pull  Ab brace, 07o6bgd  Hip Adduction squeeze with ab brace, 30x  Red Theraband AB brace with Bent  Knee Fall Out, 2x10 Bilateral   Red Theraband Bridge, 10-20x  Sidelying clamshells x 20 Red TheraBand   Piriformis stretch, push away, 8g13sqq Bilateral   Sciatic Nerve Glide, 20x Bilateral   Ilan stretch, 4h35wrx Bilateral   Prone forearm stretch, 5s92any  Prone quad stretch with strap, 7h78utb Bilateral   Prone Donkey kicks, hands stacked, 2x10 Bilateral   Quad Rocking, 55n12zfj    +Red Theraband lateral steps  +Red Theraband Bilateral External Rotation   Cable column: retro walks with small Posterior pelvic tilt 13# x 10   Cable column: palloff press, 7#, 20x Bilateral     NOT PERFORMED   +Red Theraband Beata Extension  +Red Theraband Wall clocks     Gait training per pt request:   March (even Weight Bearing) with upright posture, airex, 1 minute  Stagger: 1. Weight Bearing in mid stance / 2. Roll through push off, Bilateral, 30 sec ea  Ambulate: utilize mirror in hallway to reduce left trunk lean, 3 trials    Manual tx: Myofascial Release to Bilateral glutes    STAND: NOT PERFORMED   LSU 2 x 10   Hip Hike x 10 each   GS slant Board stretch 3 x 30 seconds  LEFT only Quadratus Lumborum stretch, left foot back, 9d30aye     Add NEXT: reduce spine mobility and stretching as core stab is added  Red Theraband lateral steps, cue toes forward  Cable Column series with Ab brace: Retro walk, 13#, 10x / Paloff press, 7#, 20x Bilateral    FUTURE:  Mechanical lumbar traction - cleared    NOTE: attempted prone press ups, increased her pain       Patient Education and Home Exercises       Education provided:   - daily HEP    Written Home Exercises Provided: Patient instructed to cont prior HEP. Exercises were reviewed and Zoila was able to demonstrate them prior to the end of the session.  Zoila demonstrated good  understanding of the education provided. See EMR under Patient Instructions for exercises provided during therapy sessions    Assessment     Pt demonstrates same pain levels, radiculopathy and slow pace  transitioning on table. Pt able to perform all therex with minimal pn provocation. Focused on spine mobility, core stabilization and sacrum mobility. Adding hip abductor and upper body strength to decrease lumbar compression. Continue to progress as tolerated.     Zoila Is progressing well towards her goals.   Pt prognosis is Good.     Pt will continue to benefit from skilled outpatient physical therapy to address the deficits listed in the problem list box on initial evaluation, provide pt/family education and to maximize pt's level of independence in the home and community environment.     Pt's spiritual, cultural and educational needs considered and pt agreeable to plan of care and goals.     Anticipated barriers to physical therapy: BMI; chronicity    Goals:   Short Term GOALS: 3 weeks  1. Patient is independent with HEP.   2. Patient demonstrates independence with core activation and postural awareness while sitting and standing.   3. Patient will reduce pn to 2/10 while performing daily activities.  4. Patient will reduce LLE radiculopathy frequency and intensity.     Long Term GOALS: 6 weeks.   1. Patient demonstrates increased l/s SB ROM to 20 degrees from floor to improve tolerance to reaching activities.   2. Patient demonstrates increased core, hip and BLE strength by 1/2 grade or greater to improve tolerance to home chores.  3. Patient demonstrates independence with body mechanics while performing chores.  4. Patient will improve FOTO limitation score to </= 32% to show improvement in condition and functional mobility.     Plan     Continue PT as deemed per POC: weight loss, spine mobility, core stabilization    Anjelica De Anda, PT

## 2023-09-19 ENCOUNTER — CLINICAL SUPPORT (OUTPATIENT)
Dept: REHABILITATION | Facility: HOSPITAL | Age: 58
End: 2023-09-19
Payer: MEDICAID

## 2023-09-19 DIAGNOSIS — Z74.09 IMPAIRED MOBILITY: ICD-10-CM

## 2023-09-19 DIAGNOSIS — M53.86 DECREASED RANGE OF MOTION OF LUMBAR SPINE: Primary | ICD-10-CM

## 2023-09-19 DIAGNOSIS — R19.8 ABDOMINAL WEAKNESS: ICD-10-CM

## 2023-09-19 PROCEDURE — 97110 THERAPEUTIC EXERCISES: CPT | Mod: PN,CQ

## 2023-09-19 NOTE — PROGRESS NOTES
[facility]   Physical Therapy Treatment Note      Name: Zoila Howard Doucet  Clinic Number: [id number]    Therapy Diagnosis:   Encounter Diagnoses   Name Primary?    Decreased range of motion of lumbar spine Yes    Abdominal weakness     Impaired mobility        Physician: Holli Mancuso FNP-C    Visit Date: 9/19/2023    Physician Orders: PT Eval and Treat   Medical Diagnosis from Referral:   M79.605 (ICD-10-CM) - Left leg pain   M54.32 (ICD-10-CM) - Sciatica of left side   M54.42,G89.29 (ICD-10-CM) - Chronic bilateral low back pain with left-sided sciatica   Evaluation Date: 8/15/2023  Authorization Period Expiration: 10/1/2023  Plan of Care Expiration: 10/24/2023  Visit # (episodes) / Visits authorized: 6 / eval + 20 (POC 5 / 12)  2nd FOTO visit 5  3rd FOTO visit 12  Progress Note Due: 9/15/2023     Time In: 1000  Time Out: 1055  Total Billable Time: 55 minutes     Precautions: Asthma; HTN    Insurance: Payor: MEDICAID / Plan: [facility] COMMUNITY PLAN [facility] [employer] ([place] MEDICA[place]) / Product Type: Managed Medicaid /     Subjective     Pt reports: doing about the same. Discomfort in the left knee and a rough night last night.   She was compliant with home exercise program.  Response to previous treatment: no complaints   Functional change: none reported    Pain: 2/10  Location: Left lumbosacral; wraps around thigh and anterior lower leg    Objective      Left short to long = posterior innominate    Treatment     Zoila received the treatments listed below:      During 53 minutes of therapeutic exercise, Zoila, was supervised by a rehabilitation technician under the direction of the treating therapist.      Zoila received therapeutic exercises to develop posture strength, ROM, core and lumbar stabilization, and flexibility for 55 minutes including:  ALL INTERVENTIONS BILLED AS THEREX PER MEDICAID GUIDELINES       TABLE:  MET: Left Hip Flexion / Right Hip Extension, 5x8sec - neg today    Lumbar rotations, 10x  Ab brace, [id number]  Piriformis stretch, push away, [id number] Bilateral   Sciatic Nerve Glide, 20x Bilateral   Ilan stretch, [id number] Bilateral   Hip Adduction squeeze with ab brace, 30x  Red Theraband AB brace with Bent Knee Fall Out, 2x10 Bilateral   Red Theraband Bridge, 10-20x  Sidelying clamshells x 20 Red TheraBand   Sidelying Open books, 10x Bilateral, Foam Roller b/w knees to reduce SI joint pull  Prone forearm stretch, [id number]  Prone quad stretch with strap, [id number] Bilateral   Prone Donkey kicks, hands stacked, 2x10 Bilateral   Quad Rocking, [id number]    Red Theraband lateral steps  Red Theraband Bilateral External Rotation   Cable column: retro walks with small Posterior pelvic tilt 13# x 10   Cable column: palloff press, 7#, 20x Bilateral     +Red Theraband Beaat Extension 20x   +Red Theraband Bilateral External Rotation 20x       Gait training per pt request:   March (even Weight Bearing) with upright posture, airex, 1 minute  Stagger: 1. Weight Bearing in mid stance / 2. Roll through push off, Bilateral, 30 sec ea  Ambulate: utilize mirror in hallway to reduce left trunk lean, 3 trials    Manual tx: Myofascial Release to Bilateral glutes    STAND: NOT PERFORMED   LSU 2 x 10   Hip Hike x 10 each   GS slant Board stretch 3 x 30 seconds  LEFT only Quadratus Lumborum stretch, left foot back, [id number]     Add NEXT: reduce spine mobility and stretching as core stab is added  Red Theraband lateral steps, cue toes forward    FUTURE:  Mechanical lumbar traction - cleared?? Next visit?     NOTE: attempted prone press ups, increased her pain       Patient Education and Home Exercises       Education provided:   - daily HEP    Written Home Exercises Provided: Patient instructed to cont prior HEP. Exercises were reviewed and Zoila was able to demonstrate them prior to the end of the session.  Zoila demonstrated good  understanding of the education provided. See EMR under Patient Instructions for  exercises provided during therapy sessions    Assessment     Pt demonstrates same pain levels, radiculopathy and slow pace transitioning on table. Discomfort in low back and left knee upon arrival after a rough night, decreased left knee flexion with quad stretch due to apprehension.     Zoila Is progressing well towards her goals.   Pt prognosis is Good.     Pt will continue to benefit from skilled outpatient physical therapy to address the deficits listed in the problem list box on initial evaluation, provide pt/family education and to maximize pt's level of independence in the home and community environment.     Pt's spiritual, cultural and educational needs considered and pt agreeable to plan of care and goals.     Anticipated barriers to physical therapy: BMI; chronicity    Goals:   Short Term GOALS: 3 weeks  1. Patient is independent with HEP.   2. Patient demonstrates independence with core activation and postural awareness while sitting and standing.   3. Patient will reduce pn to 2/10 while performing daily activities.  4. Patient will reduce LLE radiculopathy frequency and intensity.     Long Term GOALS: 6 weeks.   1. Patient demonstrates increased l/s SB ROM to 20 degrees from floor to improve tolerance to reaching activities.   2. Patient demonstrates increased core, hip and BLE strength by 1/2 grade or greater to improve tolerance to home chores.  3. Patient demonstrates independence with body mechanics while performing chores.  4. Patient will improve FOTO limitation score to </= 32% to show improvement in condition and functional mobility.     Plan     Continue PT as deemed per POC: weight loss, spine mobility, core stabilization    Leila Santos, PTA

## 2023-09-21 ENCOUNTER — CLINICAL SUPPORT (OUTPATIENT)
Dept: REHABILITATION | Facility: HOSPITAL | Age: 58
End: 2023-09-21
Payer: MEDICAID

## 2023-09-21 DIAGNOSIS — M53.86 DECREASED RANGE OF MOTION OF LUMBAR SPINE: Primary | ICD-10-CM

## 2023-09-21 DIAGNOSIS — Z74.09 IMPAIRED MOBILITY: ICD-10-CM

## 2023-09-21 DIAGNOSIS — R19.8 ABDOMINAL WEAKNESS: ICD-10-CM

## 2023-09-21 PROCEDURE — 97110 THERAPEUTIC EXERCISES: CPT | Mod: PN,CQ

## 2023-09-21 NOTE — PROGRESS NOTES
[facility]   Physical Therapy Treatment Note      Name: Zoila Howard Doucet  Clinic Number: [id number]    Therapy Diagnosis:   Encounter Diagnoses   Name Primary?    Decreased range of motion of lumbar spine Yes    Abdominal weakness     Impaired mobility        Physician: Holli Mancuso FNP-C    Visit Date: 9/21/2023    Physician Orders: PT Eval and Treat   Medical Diagnosis from Referral:   M79.605 (ICD-10-CM) - Left leg pain   M54.32 (ICD-10-CM) - Sciatica of left side   M54.42,G89.29 (ICD-10-CM) - Chronic bilateral low back pain with left-sided sciatica   Evaluation Date: 8/15/2023  Authorization Period Expiration: 10/1/2023  Plan of Care Expiration: 10/24/2023  Visit # (episodes) / Visits authorized: 7 / eval + 20 (POC 6 / 12)  2nd FOTO visit 5  3rd FOTO visit 12  Progress Note Due: 9/15/2023     Time In: 1000  Time Out: 1055  Total Billable Time: 55 minutes     Precautions: Asthma; HTN    Insurance: Payor: MEDICAID / Plan: [facility] COMMUNITY PLAN [facility] [employer] ([place]) / Product Type: Managed Medicaid /     Subjective     Pt reports: able to bend the knee better today. Just has some discomfort in the front of the left hip and back.   She was compliant with home exercise program.  Response to previous treatment: no complaints   Functional change: none reported    Pain: 2/10  Location: Left lumbosacral; wraps around thigh and anterior lower leg    Objective      Left short to long = posterior innominate    Treatment     Zoila received the treatments listed below:        Zoila received therapeutic exercises to develop posture strength, ROM, core and lumbar stabilization, and flexibility for 55 minutes including:  ALL INTERVENTIONS BILLED AS THEREX PER MEDICAID GUIDELINES       TABLE:  MET: Left Hip Flexion / Right Hip Extension, 5x8sec - neg today   Lumbar rotations, 10x  Ab brace, [id number]  Piriformis stretch, push away, [id number] Bilateral   Sciatic Nerve Glide, 20x Bilateral    Ilan stretch, [id number] Bilateral   Hip Adduction squeeze with ab brace, 30x  Green Theraband AB brace with Bent Knee Fall Out, 2x10 Bilateral   Green Theraband Bridge, 10-20x  Sidelying clamshells x 20 green TheraBand   Sidelying Open books, 20x Bilateral  Prone forearm stretch, [id number]  Prone quad stretch with strap, [id number] Bilateral   Prone Donkey kicks, hands stacked, 2x10 Bilateral   Quad Rocking, [id number]    Red Theraband lateral steps x 2 laps   Standing hip abduction/extension 20x Bilateral   Cable column: retro walks with small Posterior pelvic tilt 17# x 10   Cable column: palloff press, 10#, 20x Bilateral     Green Theraband Beata Extension 20x   Green Theraband Bilateral External Rotation 20x       Gait training per pt request: NOT PERFORMED   March (even Weight Bearing) with upright posture, airex, 1 minute  Stagger: 1. Weight Bearing in mid stance / 2. Roll through push off, Bilateral, 30 sec ea  Ambulate: utilize mirror in hallway to reduce left trunk lean, 3 trials      STAND: NOT PERFORMED   LSU 2 x 10   Hip Hike x 10 each   GS slant Board stretch 3 x 30 seconds  LEFT only Quadratus Lumborum stretch, left foot back, [id number]       Add NEXT: reduce spine mobility and stretching as core stab is added    FUTURE:  Mechanical lumbar traction - cleared??     NOTE: attempted prone press ups, increased her pain       Patient Education and Home Exercises       Education provided:   - daily HEP    Written Home Exercises Provided: Patient instructed to cont prior HEP. Exercises were reviewed and Zoila was able to demonstrate them prior to the end of the session.  Zoila demonstrated good  understanding of the education provided. See EMR under Patient Instructions for exercises provided during therapy sessions    Assessment     Pt demonstrates same pain levels, but decrease in knee pain today, just presents with left groin/hip and back discomfort. Improved tolerance to added strengthening today, challenged with  stance on left lower extremity with discomfort in left groin/hip. Continue to progress hip and core stability.     Zoila Is progressing well towards her goals.   Pt prognosis is Good.     Pt will continue to benefit from skilled outpatient physical therapy to address the deficits listed in the problem list box on initial evaluation, provide pt/family education and to maximize pt's level of independence in the home and community environment.     Pt's spiritual, cultural and educational needs considered and pt agreeable to plan of care and goals.     Anticipated barriers to physical therapy: BMI; chronicity    Goals:   Short Term GOALS: 3 weeks Ongoing 9/21/2023   1. Patient is independent with HEP.   2. Patient demonstrates independence with core activation and postural awareness while sitting and standing.   3. Patient will reduce pn to 2/10 while performing daily activities.  4. Patient will reduce LLE radiculopathy frequency and intensity.      Long Term GOALS: 6 weeks. Ongoing 9/21/2023   1. Patient demonstrates increased l/s SB ROM to 20 degrees from floor to improve tolerance to reaching activities.   2. Patient demonstrates increased core, hip and BLE strength by 1/2 grade or greater to improve tolerance to home chores.  3. Patient demonstrates independence with body mechanics while performing chores.  4. Patient will improve FOTO limitation score to </= 32% to show improvement in condition and functional mobility.     Plan     Continue PT as deemed per POC: weight loss, spine mobility, core stabilization    Leila Santos, MALI

## 2023-10-03 DIAGNOSIS — J45.21 MILD INTERMITTENT ASTHMA WITH ACUTE EXACERBATION: ICD-10-CM

## 2023-10-03 RX ORDER — ALBUTEROL SULFATE 90 UG/1
2 AEROSOL, METERED RESPIRATORY (INHALATION) EVERY 6 HOURS PRN
Qty: 18 G | Refills: 0 | Status: SHIPPED | OUTPATIENT
Start: 2023-10-03 | End: 2024-01-31 | Stop reason: SDUPTHER

## 2024-01-23 ENCOUNTER — TELEPHONE (OUTPATIENT)
Dept: FAMILY MEDICINE | Facility: CLINIC | Age: 59
End: 2024-01-23
Payer: MEDICAID

## 2024-01-31 ENCOUNTER — OFFICE VISIT (OUTPATIENT)
Dept: FAMILY MEDICINE | Facility: CLINIC | Age: 59
End: 2024-01-31
Payer: MEDICAID

## 2024-01-31 VITALS
OXYGEN SATURATION: 98 % | HEART RATE: 78 BPM | WEIGHT: 228 LBS | SYSTOLIC BLOOD PRESSURE: 122 MMHG | TEMPERATURE: 99 F | HEIGHT: 62 IN | DIASTOLIC BLOOD PRESSURE: 74 MMHG | BODY MASS INDEX: 41.96 KG/M2

## 2024-01-31 DIAGNOSIS — K21.9 GASTROESOPHAGEAL REFLUX DISEASE WITHOUT ESOPHAGITIS: ICD-10-CM

## 2024-01-31 DIAGNOSIS — E66.01 CLASS 3 SEVERE OBESITY DUE TO EXCESS CALORIES WITH SERIOUS COMORBIDITY AND BODY MASS INDEX (BMI) OF 40.0 TO 44.9 IN ADULT: ICD-10-CM

## 2024-01-31 DIAGNOSIS — N39.41 URGE INCONTINENCE OF URINE: ICD-10-CM

## 2024-01-31 DIAGNOSIS — R09.82 POST-NASAL DRAINAGE: ICD-10-CM

## 2024-01-31 DIAGNOSIS — J45.21 MILD INTERMITTENT ASTHMA WITH ACUTE EXACERBATION: ICD-10-CM

## 2024-01-31 DIAGNOSIS — I10 ESSENTIAL HYPERTENSION: ICD-10-CM

## 2024-01-31 DIAGNOSIS — B35.1 TOENAIL FUNGUS: ICD-10-CM

## 2024-01-31 DIAGNOSIS — K21.9 GASTROESOPHAGEAL REFLUX DISEASE WITHOUT ESOPHAGITIS: Primary | ICD-10-CM

## 2024-01-31 DIAGNOSIS — J02.9 SORE THROAT: ICD-10-CM

## 2024-01-31 PROCEDURE — 3078F DIAST BP <80 MM HG: CPT | Mod: CPTII,,, | Performed by: NURSE PRACTITIONER

## 2024-01-31 PROCEDURE — 3008F BODY MASS INDEX DOCD: CPT | Mod: CPTII,,, | Performed by: NURSE PRACTITIONER

## 2024-01-31 PROCEDURE — 3074F SYST BP LT 130 MM HG: CPT | Mod: CPTII,,, | Performed by: NURSE PRACTITIONER

## 2024-01-31 PROCEDURE — 1159F MED LIST DOCD IN RCRD: CPT | Mod: CPTII,,, | Performed by: NURSE PRACTITIONER

## 2024-01-31 PROCEDURE — 1160F RVW MEDS BY RX/DR IN RCRD: CPT | Mod: CPTII,,, | Performed by: NURSE PRACTITIONER

## 2024-01-31 PROCEDURE — 99214 OFFICE O/P EST MOD 30 MIN: CPT | Mod: S$PBB,,, | Performed by: NURSE PRACTITIONER

## 2024-01-31 PROCEDURE — 99215 OFFICE O/P EST HI 40 MIN: CPT | Mod: PBBFAC,PN | Performed by: NURSE PRACTITIONER

## 2024-01-31 PROCEDURE — 99999 PR PBB SHADOW E&M-EST. PATIENT-LVL V: CPT | Mod: PBBFAC,,, | Performed by: NURSE PRACTITIONER

## 2024-01-31 RX ORDER — PANTOPRAZOLE SODIUM 40 MG/1
40 TABLET, DELAYED RELEASE ORAL DAILY
Qty: 90 TABLET | Refills: 1 | Status: SHIPPED | OUTPATIENT
Start: 2024-01-31

## 2024-01-31 RX ORDER — LIDOCAINE HYDROCHLORIDE 20 MG/ML
SOLUTION OROPHARYNGEAL
Qty: 100 ML | Refills: 0 | Status: SHIPPED | OUTPATIENT
Start: 2024-01-31

## 2024-01-31 RX ORDER — FLUTICASONE PROPIONATE 50 MCG
1 SPRAY, SUSPENSION (ML) NASAL 2 TIMES DAILY
Qty: 16 G | Refills: 1 | Status: SHIPPED | OUTPATIENT
Start: 2024-01-31

## 2024-01-31 RX ORDER — ALBUTEROL SULFATE 90 UG/1
2 AEROSOL, METERED RESPIRATORY (INHALATION) EVERY 6 HOURS PRN
Qty: 18 G | Refills: 2 | Status: SHIPPED | OUTPATIENT
Start: 2024-01-31

## 2024-01-31 NOTE — PROGRESS NOTES
Subjective:       Patient ID: Zoila Moreland is a 58 y.o. female.    Chief Complaint: Hyperlipidemia, overactive bladder, and Sore Throat    Patient presents today for follow-up and obtain refills for chronic conditions.  Patient has hypertension, hyperlipidemia, GERD, asthma, urge incontinence, and fever blisters.  Patient is requesting refills on all of her current medications and tolerating them well.  She has a sore throat and post nasal drainage today that started yesterday.   Patient is obese with a BMI of 41.15    Hypertension  This is a chronic problem. The current episode started more than 1 year ago. The problem has been waxing and waning since onset. The problem is controlled. Pertinent negatives include no anxiety, blurred vision, chest pain, malaise/fatigue, orthopnea, palpitations, peripheral edema, PND or sweats. There are no associated agents to hypertension. Risk factors for coronary artery disease include obesity, stress and dyslipidemia. Past treatments include ACE inhibitors and beta blockers. The current treatment provides significant improvement. Compliance problems include exercise and diet.  There is no history of angina or kidney disease. There is no history of chronic renal disease.   Hyperlipidemia  This is a chronic problem. The current episode started more than 1 month ago. The problem is uncontrolled. Recent lipid tests were reviewed and are variable. Exacerbating diseases include obesity. She has no history of chronic renal disease. Pertinent negatives include no chest pain, focal weakness or leg pain. Current antihyperlipidemic treatment includes statins. The current treatment provides significant improvement of lipids. Compliance problems include adherence to exercise and adherence to diet.  Risk factors for coronary artery disease include dyslipidemia, hypertension, obesity, a sedentary lifestyle and stress.   Gastroesophageal Reflux  She complains of heartburn. She reports  no chest pain or no nausea. This is a chronic problem. The current episode started more than 1 month ago. The problem occurs occasionally. The problem has been waxing and waning. The heartburn duration is less than a minute. The heartburn is located in the substernum. The heartburn is of moderate intensity. The heartburn does not wake her from sleep. The heartburn limits her activity. The heartburn doesn't change with position. The symptoms are aggravated by certain foods and exertion. Pertinent negatives include no melena or muscle weakness. Risk factors include obesity. She has tried a PPI for the symptoms. The treatment provided significant relief.   Asthma  She complains of chest tightness. This is a new problem. The current episode started 1 to 4 weeks ago. Asthma causes daytime symptoms weekly. Asthma causes nighttime symptoms weekly. The problem has been waxing and waning. The cough is non-productive. Associated symptoms include ear congestion, heartburn, postnasal drip and rhinorrhea. Pertinent negatives include no appetite change, chest pain, fever, malaise/fatigue, nasal congestion, PND or sweats. Her symptoms are aggravated by pollen, URI and strenuous activity. Her symptoms are alleviated by beta-agonist and steroid inhaler. She reports moderate improvement on treatment. Her symptoms are not alleviated by rest. Her past medical history is significant for asthma. There is no history of bronchitis.     Review of Systems   Constitutional:  Negative for activity change, appetite change, fever, malaise/fatigue and unexpected weight change.        Obesity   HENT:  Positive for postnasal drip and rhinorrhea. Negative for sinus pressure and sinus pain.    Eyes:  Negative for blurred vision, photophobia, pain, discharge and visual disturbance.   Respiratory:          Asthma   Cardiovascular:  Negative for chest pain, palpitations, orthopnea and PND.        Hypertension, hyperlipidemia   Gastrointestinal:   Positive for heartburn. Negative for blood in stool, constipation, melena and nausea.        Gerd   Endocrine: Negative for cold intolerance, heat intolerance, polydipsia and polyuria.   Genitourinary:  Positive for urgency. Negative for difficulty urinating, dysuria, hematuria and menstrual problem.   Musculoskeletal:  Positive for arthralgias. Negative for gait problem, joint swelling and muscle weakness.   Skin:  Negative for color change and rash.   Allergic/Immunologic: Negative for immunocompromised state.   Neurological:  Negative for focal weakness, speech difficulty and weakness.   Psychiatric/Behavioral:  Negative for confusion, dysphoric mood, self-injury and suicidal ideas.        Past Medical History:   Diagnosis Date    Allergy     Anemia 3/30/2020    Asthma     Fever blister     Hypertension       Past Surgical History:   Procedure Laterality Date    BILATERAL SALPINGOOPHORECTOMY Bilateral 5/18/2007    Torsion of ovaries and tubes    BREAST BIOPSY Right     (-)    CARDIAC CATHETERIZATION      cardiac stents  01/2017    1 stent    HYSTERECTOMY  1/13/2006    uterus and cervix due to AUB    OOPHORECTOMY      TRIGGER FINGER RELEASE Right 9/8/2020    Procedure: RELEASE, TRIGGER FINGER R-thumb;  Surgeon: Sam Bangura Jr., MD;  Location: [facility];  Service: Orthopedics;  Laterality: Right;       Family History   Problem Relation Age of Onset    Heart disease Mother     Diabetes Mother     Heart disease Father        Social History     Socioeconomic History    Marital status:      Spouse name: Danny    Number of children: 2    Years of education: 12th   Occupational History    Occupation: homemaker   Tobacco Use    Smoking status: Never    Smokeless tobacco: Never   Substance and Sexual Activity    Alcohol use: No    Drug use: No    Sexual activity: Yes     Partners: Male     Birth control/protection: Surgical   Other Topics Concern    Are you pregnant or think you may be? No    Breast-feeding No        Current Outpatient Medications   Medication Sig Dispense Refill    alirocumab (PRALUENT PEN) 150 mg/mL PnIj Inject 2 mLs (300 mg total) into the skin every 30 days. 2 mL 11    ascorbic acid, vitamin C, (VITAMIN C) 1000 MG tablet Take 1,000 mg by mouth once daily.      aspirin 81 MG Chew Take 1 tablet (81 mg total) by mouth once daily. 1 tablet 0    atorvastatin (LIPITOR) 80 MG tablet Take 1 tablet (80 mg total) by mouth every evening. 90 tablet 3    calcium-vitamin D (OSCAL) 250 (625)-125 mg-unit per tablet Take 1 tablet by mouth once daily.      carvediloL (COREG) 25 MG tablet Take 1 tablet (25 mg total) by mouth 2 (two) times daily with meals. 180 tablet 3    ezetimibe (ZETIA) 10 mg tablet Take 1 tablet (10 mg total) by mouth once daily. 90 tablet 3    lisinopriL (PRINIVIL,ZESTRIL) 40 MG tablet Take 1 tablet (40 mg total) by mouth every evening. 90 tablet 3    solifenacin (VESICARE) 10 MG tablet Take 1 tablet (10 mg total) by mouth once daily. Appointment required for future refills 90 tablet 1    spironolactone (ALDACTONE) 25 MG tablet Take 1 tablet (25 mg total) by mouth once daily. 90 tablet 3    valACYclovir (VALTREX) 1000 MG tablet 2 tablets PO at onset of fever blister. Repeat 12 hours later x1 dose. Repeat for repeat occurrence. 30 tablet 5    albuterol (PROVENTIL/VENTOLIN HFA) 90 mcg/actuation inhaler Inhale 2 puffs into the lungs every 6 (six) hours as needed for Wheezing. 18 g 2    fluticasone propionate (FLONASE) 50 mcg/actuation nasal spray 1 spray (50 mcg total) by Each Nostril route 2 (two) times daily. 16 g 1    LIDOcaine viscous HCl 2% (LIDOCAINE VISCOUS) 2 % Soln by Mucous Membrane route every 3 (three) hours. Gargle 5 ml in the throat every 3 hours as needed for sore throat. 100 mL 0    pantoprazole (PROTONIX) 40 MG tablet Take 1 tablet (40 mg total) by mouth once daily. 90 tablet 1     No current facility-administered medications for this visit.       Review of patient's allergies  "indicates:  No Known Allergies  Objective:      Blood pressure 122/74, pulse 78, temperature 98.8 °F (37.1 °C), height 5' 2" (1.575 m), weight 103.4 kg (228 lb), SpO2 98 %. Body mass index is 41.7 kg/m².   Physical Exam  Vitals and nursing note reviewed.   Constitutional:       General: She is not in acute distress.     Appearance: Normal appearance. She is well-developed. She is obese. She is not ill-appearing.   HENT:      Head: Normocephalic and atraumatic.      Right Ear: Tympanic membrane normal. No middle ear effusion.      Left Ear: Tympanic membrane normal.  No middle ear effusion.      Nose: Rhinorrhea present. Rhinorrhea is clear.      Right Sinus: No maxillary sinus tenderness or frontal sinus tenderness.      Left Sinus: No maxillary sinus tenderness or frontal sinus tenderness.      Mouth/Throat:      Mouth: Mucous membranes are moist.      Pharynx: Uvula midline. Oropharyngeal exudate (Clear postnasal drainage) present. No pharyngeal swelling, posterior oropharyngeal erythema or uvula swelling.   Eyes:      General: Lids are normal. Lids are everted, no foreign bodies appreciated.      Conjunctiva/sclera: Conjunctivae normal.      Pupils: Pupils are equal, round, and reactive to light.      Right eye: Pupil is round and reactive.      Left eye: Pupil is round and reactive.   Neck:      Trachea: Trachea normal.   Cardiovascular:      Rate and Rhythm: Normal rate and regular rhythm.      Pulses: Normal pulses.      Heart sounds: Normal heart sounds, S1 normal and S2 normal.   Pulmonary:      Effort: Pulmonary effort is normal.      Breath sounds: Normal breath sounds and air entry. No stridor or transmitted upper airway sounds. No wheezing.   Abdominal:      General: Abdomen is flat. Bowel sounds are normal.      Palpations: Abdomen is soft. Abdomen is not rigid.      Tenderness: There is no guarding.   Musculoskeletal:      Right shoulder: Normal.      Left shoulder: Normal.      Cervical back: Normal " range of motion and neck supple. No muscular tenderness.   Lymphadenopathy:      Cervical: No cervical adenopathy.   Skin:     General: Skin is warm and dry.      Capillary Refill: Capillary refill takes less than 2 seconds.   Neurological:      General: No focal deficit present.      Mental Status: She is alert and oriented to person, place, and time.   Psychiatric:         Mood and Affect: Mood normal.         Behavior: Behavior normal. Behavior is cooperative.         Thought Content: Thought content normal.         Judgment: Judgment normal.             Assessment:       1. Gastroesophageal reflux disease without esophagitis    2. Mild intermittent asthma with acute exacerbation    3. Essential hypertension    4. Urge incontinence of urine    5. Toenail fungus    6. Post-nasal drainage    7. Gastroesophageal reflux disease without esophagitis Active   8. Sore throat    9. Class 3 severe obesity due to excess calories with serious comorbidity and body mass index (BMI) of 40.0 to 44.9 in adult          Plan:       Zoila was seen today for hyperlipidemia, overactive bladder and sore throat.    Diagnoses and all orders for this visit:    Gastroesophageal reflux disease without esophagitis  -     pantoprazole (PROTONIX) 40 MG tablet; Take 1 tablet (40 mg total) by mouth once daily.  Stable    Mild intermittent asthma with acute exacerbation  -     CBC Auto Differential; Future  -     Comprehensive Metabolic Panel; Future  -     CBC Auto Differential  -     Comprehensive Metabolic Panel  -     albuterol (PROVENTIL/VENTOLIN HFA) 90 mcg/actuation inhaler; Inhale 2 puffs into the lungs every 6 (six) hours as needed for Wheezing.  Stable    Essential hypertension  -     CBC Auto Differential; Future  -     Comprehensive Metabolic Panel; Future  -     Lipid Panel; Future  -     Urinalysis; Future  -     CBC Auto Differential  -     Comprehensive Metabolic Panel  -     Lipid Panel  -     Urinalysis  Stable.  Continue  current medication  Lifestyle changes: Reduce the amount of salt in your diet; Lose weight; Avoid drinking too much alcohol; Exercise at least 30 minutes per day most days of the week.  Continue current medications and home BP monitoring.      Urge incontinence of urine  Stable    Toenail fungus  -     Ambulatory referral/consult to Podiatry; Future  Patient tried tea tree oil to the toenail. She states she stopped applying it due to pain it was causing.    Post-nasal drainage  -     fluticasone propionate (FLONASE) 50 mcg/actuation nasal spray; 1 spray (50 mcg total) by Each Nostril route 2 (two) times daily.    Gastroesophageal reflux disease without esophagitis  -     pantoprazole (PROTONIX) 40 MG tablet; Take 1 tablet (40 mg total) by mouth once daily.    Sore throat  -     LIDOcaine viscous HCl 2% (LIDOCAINE VISCOUS) 2 % Soln; by Mucous Membrane route every 3 (three) hours. Gargle 5 ml in the throat every 3 hours as needed for sore throat.    Class 3 severe obesity due to excess calories with serious comorbidity and body mass index (BMI) of 40.0 to 44.9 in adult  The patient is asked to make an attempt to improve diet and exercise patterns to aid in medical management of this problem.            Obtain labs.  Will notify of results when available and reviewed.  Follow-up in 6 months with labs prior to next office visit for chronic condition management

## 2024-02-05 ENCOUNTER — TELEPHONE (OUTPATIENT)
Dept: FAMILY MEDICINE | Facility: CLINIC | Age: 59
End: 2024-02-05
Payer: MEDICAID

## 2024-02-05 DIAGNOSIS — J01.00 ACUTE MAXILLARY SINUSITIS, RECURRENCE NOT SPECIFIED: Primary | ICD-10-CM

## 2024-02-05 RX ORDER — AMOXICILLIN AND CLAVULANATE POTASSIUM 875; 125 MG/1; MG/1
1 TABLET, FILM COATED ORAL 2 TIMES DAILY
Qty: 20 TABLET | Refills: 0 | Status: SHIPPED | OUTPATIENT
Start: 2024-02-05

## 2024-02-05 NOTE — TELEPHONE ENCOUNTER
Spoke with patient to advise antibiotic had been sent to her pharmacy and that she was to take the medication twice daily with food. Patient expressed verbal understanding.

## 2024-02-05 NOTE — TELEPHONE ENCOUNTER
Pt came in last week and was given meds for cold  but not working.Wants   Zpak\Antibotics    please advise?

## 2024-03-28 ENCOUNTER — PATIENT MESSAGE (OUTPATIENT)
Dept: ADMINISTRATIVE | Facility: HOSPITAL | Age: 59
End: 2024-03-28
Payer: MEDICAID

## 2024-03-28 LAB
ALBUMIN SERPL-MCNC: 4.2 G/DL (ref 3.8–4.9)
ALBUMIN/GLOB SERPL: 1.9 {RATIO} (ref 1.2–2.2)
ALP SERPL-CCNC: 106 IU/L (ref 44–121)
ALT SERPL-CCNC: 24 IU/L (ref 0–32)
APPEARANCE UR: CLEAR
AST SERPL-CCNC: 14 IU/L (ref 0–40)
BASOPHILS # BLD AUTO: 0 X10E3/UL (ref 0–0.2)
BASOPHILS NFR BLD AUTO: 1 %
BILIRUB SERPL-MCNC: 0.4 MG/DL (ref 0–1.2)
BILIRUB UR QL STRIP: NEGATIVE
BUN SERPL-MCNC: 15 MG/DL (ref 6–24)
BUN/CREAT SERPL: 18 (ref 9–23)
CALCIUM SERPL-MCNC: 9.9 MG/DL (ref 8.7–10.2)
CHLORIDE SERPL-SCNC: 104 MMOL/L (ref 96–106)
CHOLEST SERPL-MCNC: 86 MG/DL (ref 100–199)
CO2 SERPL-SCNC: 26 MMOL/L (ref 20–29)
COLOR UR: YELLOW
CREAT SERPL-MCNC: 0.85 MG/DL (ref 0.57–1)
EOSINOPHIL # BLD AUTO: 0.1 X10E3/UL (ref 0–0.4)
EOSINOPHIL NFR BLD AUTO: 1 %
ERYTHROCYTE [DISTWIDTH] IN BLOOD BY AUTOMATED COUNT: 18 % (ref 11.7–15.4)
EST. GFR  (NO RACE VARIABLE): 79 ML/MIN/1.73
GLOBULIN SER CALC-MCNC: 2.2 G/DL (ref 1.5–4.5)
GLUCOSE SERPL-MCNC: 104 MG/DL (ref 70–99)
GLUCOSE UR QL STRIP: NEGATIVE
HCT VFR BLD AUTO: 39.3 % (ref 34–46.6)
HDLC SERPL-MCNC: 57 MG/DL
HGB BLD-MCNC: 11.5 G/DL (ref 11.1–15.9)
HGB UR QL STRIP: NEGATIVE
IMM GRANULOCYTES # BLD AUTO: 0 X10E3/UL (ref 0–0.1)
IMM GRANULOCYTES NFR BLD AUTO: 0 %
KETONES UR QL STRIP: NEGATIVE
LDLC SERPL CALC-MCNC: 17 MG/DL (ref 0–99)
LEUKOCYTE ESTERASE UR QL STRIP: NEGATIVE
LYMPHOCYTES # BLD AUTO: 2.1 X10E3/UL (ref 0.7–3.1)
LYMPHOCYTES NFR BLD AUTO: 32 %
MCH RBC QN AUTO: 20.1 PG (ref 26.6–33)
MCHC RBC AUTO-ENTMCNC: 29.3 G/DL (ref 31.5–35.7)
MCV RBC AUTO: 69 FL (ref 79–97)
MICRO URNS: NORMAL
MONOCYTES # BLD AUTO: 0.4 X10E3/UL (ref 0.1–0.9)
MONOCYTES NFR BLD AUTO: 6 %
NEUTROPHILS # BLD AUTO: 3.9 X10E3/UL (ref 1.4–7)
NEUTROPHILS NFR BLD AUTO: 60 %
NITRITE UR QL STRIP: NEGATIVE
PH UR STRIP: 6 [PH] (ref 5–7.5)
PLATELET # BLD AUTO: 309 X10E3/UL (ref 150–450)
POTASSIUM SERPL-SCNC: 5.2 MMOL/L (ref 3.5–5.2)
PROT SERPL-MCNC: 6.4 G/DL (ref 6–8.5)
PROT UR QL STRIP: NEGATIVE
RBC # BLD AUTO: 5.73 X10E6/UL (ref 3.77–5.28)
SODIUM SERPL-SCNC: 142 MMOL/L (ref 134–144)
SP GR UR STRIP: 1.02 (ref 1–1.03)
TRIGL SERPL-MCNC: 47 MG/DL (ref 0–149)
UROBILINOGEN UR STRIP-MCNC: 0.2 MG/DL (ref 0.2–1)
VLDLC SERPL CALC-MCNC: 12 MG/DL (ref 5–40)
WBC # BLD AUTO: 6.5 X10E3/UL (ref 3.4–10.8)

## 2024-04-19 ENCOUNTER — PATIENT MESSAGE (OUTPATIENT)
Dept: ADMINISTRATIVE | Facility: HOSPITAL | Age: 59
End: 2024-04-19
Payer: MEDICAID

## 2024-05-01 DIAGNOSIS — N39.41 URGE INCONTINENCE OF URINE: ICD-10-CM

## 2024-05-01 RX ORDER — SOLIFENACIN SUCCINATE 10 MG/1
10 TABLET, FILM COATED ORAL
Qty: 90 TABLET | Refills: 1 | Status: SHIPPED | OUTPATIENT
Start: 2024-05-01

## 2024-05-22 ENCOUNTER — PATIENT OUTREACH (OUTPATIENT)
Dept: ADMINISTRATIVE | Facility: HOSPITAL | Age: 59
End: 2024-05-22
Payer: MEDICAID

## 2024-05-22 NOTE — PROGRESS NOTES
Population Health Chart Review & Patient Outreach Details      Additional [facility] Health Notes:               Updates Requested / Reviewed:      Updated Care Coordination Note, Care Everywhere, , External Sources: DIS, and Immunizations Reconciliation Completed or Queried: [place]         Health Maintenance Topics Overdue:      VB Score: 2     Mammogram  Hemoglobin A1c                       Health Maintenance Topic(s) Outreach Outcomes & Actions Taken:    Breast Cancer Screening - Outreach Outcomes & Actions Taken  : Mammogram Order Placed

## 2024-05-27 ENCOUNTER — HOSPITAL ENCOUNTER (OUTPATIENT)
Dept: RADIOLOGY | Facility: HOSPITAL | Age: 59
Discharge: HOME | End: 2024-05-27
Attending: NURSE PRACTITIONER
Payer: MEDICAID

## 2024-05-27 DIAGNOSIS — Z12.31 OTHER SCREENING MAMMOGRAM: ICD-10-CM

## 2024-05-27 PROCEDURE — 77067 SCR MAMMO BI INCL CAD: CPT | Mod: 26,,, | Performed by: RADIOLOGY

## 2024-05-27 PROCEDURE — 77063 BREAST TOMOSYNTHESIS BI: CPT | Mod: TC,PO

## 2024-05-27 PROCEDURE — 77063 BREAST TOMOSYNTHESIS BI: CPT | Mod: 26,,, | Performed by: RADIOLOGY

## 2024-07-30 ENCOUNTER — OFFICE VISIT (OUTPATIENT)
Dept: FAMILY MEDICINE | Facility: CLINIC | Age: 59
End: 2024-07-30
Payer: MEDICAID

## 2024-07-30 VITALS
OXYGEN SATURATION: 99 % | SYSTOLIC BLOOD PRESSURE: 132 MMHG | BODY MASS INDEX: 41.59 KG/M2 | HEIGHT: 62 IN | TEMPERATURE: 99 F | DIASTOLIC BLOOD PRESSURE: 80 MMHG | WEIGHT: 226 LBS | HEART RATE: 88 BPM

## 2024-07-30 DIAGNOSIS — K21.9 GASTROESOPHAGEAL REFLUX DISEASE WITHOUT ESOPHAGITIS: ICD-10-CM

## 2024-07-30 DIAGNOSIS — M19.90 ARTHRITIS: ICD-10-CM

## 2024-07-30 DIAGNOSIS — E66.01 CLASS 3 SEVERE OBESITY DUE TO EXCESS CALORIES WITH SERIOUS COMORBIDITY AND BODY MASS INDEX (BMI) OF 40.0 TO 44.9 IN ADULT: ICD-10-CM

## 2024-07-30 DIAGNOSIS — Z95.5 S/P CORONARY ARTERY STENT PLACEMENT: ICD-10-CM

## 2024-07-30 DIAGNOSIS — J45.21 MILD INTERMITTENT ASTHMA WITH ACUTE EXACERBATION: Primary | ICD-10-CM

## 2024-07-30 DIAGNOSIS — I10 ESSENTIAL HYPERTENSION: ICD-10-CM

## 2024-07-30 PROCEDURE — 3075F SYST BP GE 130 - 139MM HG: CPT | Mod: CPTII,,, | Performed by: NURSE PRACTITIONER

## 2024-07-30 PROCEDURE — 3079F DIAST BP 80-89 MM HG: CPT | Mod: CPTII,,, | Performed by: NURSE PRACTITIONER

## 2024-07-30 PROCEDURE — 99214 OFFICE O/P EST MOD 30 MIN: CPT | Mod: S$PBB,,, | Performed by: NURSE PRACTITIONER

## 2024-07-30 PROCEDURE — 4010F ACE/ARB THERAPY RXD/TAKEN: CPT | Mod: CPTII,,, | Performed by: NURSE PRACTITIONER

## 2024-07-30 PROCEDURE — 1159F MED LIST DOCD IN RCRD: CPT | Mod: CPTII,,, | Performed by: NURSE PRACTITIONER

## 2024-07-30 PROCEDURE — 3008F BODY MASS INDEX DOCD: CPT | Mod: CPTII,,, | Performed by: NURSE PRACTITIONER

## 2024-07-30 PROCEDURE — 1160F RVW MEDS BY RX/DR IN RCRD: CPT | Mod: CPTII,,, | Performed by: NURSE PRACTITIONER

## 2024-07-30 PROCEDURE — 99214 OFFICE O/P EST MOD 30 MIN: CPT | Mod: PBBFAC,PN | Performed by: NURSE PRACTITIONER

## 2024-07-30 PROCEDURE — 99999 PR PBB SHADOW E&M-EST. PATIENT-LVL IV: CPT | Mod: PBBFAC,,, | Performed by: NURSE PRACTITIONER

## 2024-07-30 RX ORDER — FLUTICASONE PROPIONATE AND SALMETEROL 100; 50 UG/1; UG/1
1 POWDER RESPIRATORY (INHALATION) 2 TIMES DAILY
Qty: 60 EACH | Refills: 2 | Status: SHIPPED | OUTPATIENT
Start: 2024-07-30 | End: 2025-07-30

## 2024-07-30 RX ORDER — PANTOPRAZOLE SODIUM 40 MG/1
40 TABLET, DELAYED RELEASE ORAL DAILY
Qty: 90 TABLET | Refills: 1 | Status: SHIPPED | OUTPATIENT
Start: 2024-07-30

## 2024-07-30 RX ORDER — MELOXICAM 15 MG/1
15 TABLET ORAL DAILY
Qty: 30 TABLET | Refills: 5 | Status: SHIPPED | OUTPATIENT
Start: 2024-07-30

## 2024-07-30 RX ORDER — PANTOPRAZOLE SODIUM 40 MG/1
40 TABLET, DELAYED RELEASE ORAL DAILY
Qty: 90 TABLET | Refills: 1 | Status: SHIPPED | OUTPATIENT
Start: 2024-07-30 | End: 2024-07-30 | Stop reason: SDUPTHER

## 2024-07-30 NOTE — PROGRESS NOTES
Subjective:       Patient ID: Zoila Moreland is a 58 y.o. female.    Chief Complaint: Hypertension, Hyperlipidemia, and Follow-up    Patient presents today for follow-up and obtain refills for chronic conditions.  Patient has hypertension, hyperlipidemia, GERD, asthma, urge incontinence, and fever blisters.    Patient is obese with a BMI of 41.34    Hypertension  This is a chronic problem. The current episode started more than 1 year ago. The problem has been waxing and waning since onset. The problem is controlled. Pertinent negatives include no anxiety, blurred vision, chest pain, headaches, malaise/fatigue, orthopnea, palpitations, peripheral edema, PND, shortness of breath or sweats. There are no associated agents to hypertension. Risk factors for coronary artery disease include obesity, stress and dyslipidemia. Past treatments include ACE inhibitors and beta blockers. The current treatment provides significant improvement. Compliance problems include exercise and diet.  There is no history of angina or kidney disease. There is no history of chronic renal disease.   Hyperlipidemia  This is a chronic problem. The current episode started more than 1 month ago. The problem is uncontrolled. Recent lipid tests were reviewed and are variable. Exacerbating diseases include obesity. She has no history of chronic renal disease. Pertinent negatives include no chest pain, focal weakness, leg pain or shortness of breath. Current antihyperlipidemic treatment includes statins. The current treatment provides significant improvement of lipids. Compliance problems include adherence to exercise and adherence to diet.  Risk factors for coronary artery disease include dyslipidemia, hypertension, obesity, a sedentary lifestyle and stress.   Gastroesophageal Reflux  She complains of heartburn. She reports no abdominal pain, no chest pain, no coughing, no nausea or no sore throat. This is a chronic problem. The current episode  started more than 1 month ago. The problem occurs occasionally. The problem has been waxing and waning. The heartburn duration is less than a minute. The heartburn is located in the substernum. The heartburn is of moderate intensity. The heartburn does not wake her from sleep. The heartburn limits her activity. The heartburn doesn't change with position. The symptoms are aggravated by certain foods and exertion. Pertinent negatives include no melena or muscle weakness. Risk factors include obesity. She has tried a PPI for the symptoms. The treatment provided significant relief.   Asthma  She complains of chest tightness. There is no cough or shortness of breath. This is a new problem. The current episode started 1 to 4 weeks ago. Asthma causes daytime symptoms weekly. Asthma causes nighttime symptoms weekly. The problem has been waxing and waning. The cough is non-productive. Associated symptoms include ear congestion, heartburn, postnasal drip and rhinorrhea. Pertinent negatives include no appetite change, chest pain, ear pain, fever, headaches, malaise/fatigue, nasal congestion, PND, sore throat, sweats or trouble swallowing. Her symptoms are aggravated by pollen, URI and strenuous activity. Her symptoms are alleviated by beta-agonist and steroid inhaler. She reports moderate improvement on treatment. Her symptoms are not alleviated by rest. Her past medical history is significant for asthma. There is no history of bronchitis.     Review of Systems   Constitutional:  Negative for activity change, appetite change, fever, malaise/fatigue and unexpected weight change.        Obesity   HENT:  Positive for postnasal drip and rhinorrhea. Negative for congestion, ear discharge, ear pain, sinus pressure, sinus pain, sore throat, trouble swallowing and voice change.    Eyes:  Negative for blurred vision, photophobia, pain, discharge and visual disturbance.   Respiratory:  Negative for cough, chest tightness and shortness  of breath.         Asthma   Cardiovascular:  Negative for chest pain, palpitations, orthopnea and PND.        Hypertension, hyperlipidemia   Gastrointestinal:  Positive for heartburn. Negative for abdominal pain, blood in stool, constipation, melena, nausea and vomiting.        Gerd   Endocrine: Negative for cold intolerance, heat intolerance, polydipsia and polyuria.   Genitourinary:  Positive for urgency. Negative for difficulty urinating, dysuria, hematuria and menstrual problem.   Musculoskeletal:  Negative for arthralgias, gait problem and muscle weakness.   Skin:  Negative for color change and rash.   Allergic/Immunologic: Negative for immunocompromised state.   Neurological:  Negative for focal weakness, speech difficulty and headaches.   Psychiatric/Behavioral:  Negative for confusion, dysphoric mood, self-injury and suicidal ideas.        Past Medical History:   Diagnosis Date    Allergy     Anemia 3/30/2020    Asthma     Fever blister     Hypertension       Past Surgical History:   Procedure Laterality Date    BILATERAL SALPINGOOPHORECTOMY Bilateral 5/18/2007    Torsion of ovaries and tubes    BREAST BIOPSY Right     (-)    CARDIAC CATHETERIZATION      cardiac stents  01/2017    1 stent    HYSTERECTOMY  1/13/2006    uterus and cervix due to AUB    OOPHORECTOMY      TRIGGER FINGER RELEASE Right 9/8/2020    Procedure: RELEASE, TRIGGER FINGER R-thumb;  Surgeon: Sam Bangura Jr., MD;  Location: [facility];  Service: Orthopedics;  Laterality: Right;       Family History   Problem Relation Name Age of Onset    Heart disease Mother      Diabetes Mother      Heart disease Father         Social History     Socioeconomic History    Marital status:      Spouse name: Danny    Number of children: 2    Years of education: 12th   Occupational History    Occupation: homemaker   Tobacco Use    Smoking status: Never    Smokeless tobacco: Never   Substance and Sexual Activity    Alcohol use: No    Drug use: No    Sexual  activity: Yes     Partners: Male     Birth control/protection: Surgical   Other Topics Concern    Are you pregnant or think you may be? No    Breast-feeding No       Current Outpatient Medications   Medication Sig Dispense Refill    albuterol (PROVENTIL/VENTOLIN HFA) 90 mcg/actuation inhaler Inhale 2 puffs into the lungs every 6 (six) hours as needed for Wheezing. 18 g 2    ascorbic acid, vitamin C, (VITAMIN C) 1000 MG tablet Take 1,000 mg by mouth once daily.      aspirin 81 MG Chew Take 1 tablet (81 mg total) by mouth once daily. 1 tablet 0    atorvastatin (LIPITOR) 80 MG tablet TAKE 1 TABLET BY MOUTH EVERY  EVENING 90 tablet 3    calcium-vitamin D (OSCAL) 250 (625)-125 mg-unit per tablet Take 1 tablet by mouth once daily.      carvediloL (COREG) 25 MG tablet Take 1 tablet (25 mg total) by mouth 2 (two) times daily with meals. 180 tablet 1    ezetimibe (ZETIA) 10 mg tablet Take 1 tablet (10 mg total) by mouth once daily. 90 tablet 3    fluticasone propionate (FLONASE) 50 mcg/actuation nasal spray 1 spray (50 mcg total) by Each Nostril route 2 (two) times daily. 16 g 1    LIDOcaine viscous HCl 2% (LIDOCAINE VISCOUS) 2 % Soln by Mucous Membrane route every 3 (three) hours. Gargle 5 ml in the throat every 3 hours as needed for sore throat. 100 mL 0    lisinopriL (PRINIVIL,ZESTRIL) 40 MG tablet TAKE 1 TABLET BY MOUTH ONCE DAILY IN THE EVENING 90 tablet 3    solifenacin (VESICARE) 10 MG tablet TAKE 1 TABLET BY MOUTH ONCE DAILY . APPOINTMENT REQUIRED FOR FUTURE REFILLS 90 tablet 1    spironolactone (ALDACTONE) 25 MG tablet Take 1 tablet (25 mg total) by mouth once daily. 90 tablet 3    valACYclovir (VALTREX) 1000 MG tablet 2 tablets PO at onset of fever blister. Repeat 12 hours later x1 dose. Repeat for repeat occurrence. 30 tablet 5    alirocumab (PRALUENT PEN) 150 mg/mL PnIj Inject 2 mLs (300 mg total) into the skin every 30 days. (Patient not taking: Reported on 7/30/2024) 2 mL 11    fluticasone-salmeterol diskus  "inhaler 100-50 mcg Inhale 1 puff into the lungs 2 (two) times daily. Controller 60 each 2    meloxicam (MOBIC) 15 MG tablet Take 1 tablet (15 mg total) by mouth once daily. 30 tablet 5    pantoprazole (PROTONIX) 40 MG tablet Take 1 tablet (40 mg total) by mouth once daily. 90 tablet 1     No current facility-administered medications for this visit.       Review of patient's allergies indicates:  No Known Allergies  Objective:      Blood pressure 132/80, pulse 88, temperature 98.9 °F (37.2 °C), height 5' 2" (1.575 m), weight 102.5 kg (226 lb), SpO2 99%. Body mass index is 41.34 kg/m².   Physical Exam  Vitals and nursing note reviewed.   Constitutional:       General: She is not in acute distress.     Appearance: Normal appearance. She is well-developed. She is obese. She is not ill-appearing.   HENT:      Head: Normocephalic and atraumatic.      Right Ear: Tympanic membrane normal. No middle ear effusion.      Left Ear: Tympanic membrane normal.  No middle ear effusion.      Nose: Nose normal.      Right Sinus: No maxillary sinus tenderness or frontal sinus tenderness.      Left Sinus: No maxillary sinus tenderness or frontal sinus tenderness.      Mouth/Throat:      Mouth: Mucous membranes are moist.      Pharynx: Uvula midline. No pharyngeal swelling, oropharyngeal exudate, posterior oropharyngeal erythema or uvula swelling.   Eyes:      General: Lids are normal. Lids are everted, no foreign bodies appreciated.      Conjunctiva/sclera: Conjunctivae normal.      Pupils: Pupils are equal, round, and reactive to light.      Right eye: Pupil is round and reactive.      Left eye: Pupil is round and reactive.   Neck:      Trachea: Trachea normal.   Cardiovascular:      Rate and Rhythm: Normal rate and regular rhythm.      Pulses: Normal pulses.      Heart sounds: Normal heart sounds, S1 normal and S2 normal.   Pulmonary:      Effort: Pulmonary effort is normal.      Breath sounds: Normal breath sounds and air entry. No " stridor or transmitted upper airway sounds. No wheezing.   Abdominal:      General: Abdomen is flat. Bowel sounds are normal.      Palpations: Abdomen is soft. Abdomen is not rigid.      Tenderness: There is no guarding.   Musculoskeletal:         General: Normal range of motion.      Right shoulder: Normal.      Left shoulder: Normal.      Cervical back: Normal range of motion and neck supple. No muscular tenderness.   Lymphadenopathy:      Cervical: No cervical adenopathy.   Skin:     General: Skin is warm and dry.      Capillary Refill: Capillary refill takes less than 2 seconds.   Neurological:      General: No focal deficit present.      Mental Status: She is alert and oriented to person, place, and time.   Psychiatric:         Mood and Affect: Mood normal.         Behavior: Behavior normal. Behavior is cooperative.         Thought Content: Thought content normal.         Judgment: Judgment normal.             Assessment:       1. Mild intermittent asthma with acute exacerbation    2. Gastroesophageal reflux disease without esophagitis Active   3. Essential hypertension    4. S/P coronary artery stent placement    5. Arthritis    6. Class 3 severe obesity due to excess calories with serious comorbidity and body mass index (BMI) of 40.0 to 44.9 in adult            Plan:       Zoila was seen today for hypertension, hyperlipidemia and follow-up.    Diagnoses and all orders for this visit:    Mild intermittent asthma with acute exacerbation  -     fluticasone-salmeterol diskus inhaler 100-50 mcg; Inhale 1 puff into the lungs 2 (two) times daily. Controller    Gastroesophageal reflux disease without esophagitis  -     Discontinue: pantoprazole (PROTONIX) 40 MG tablet; Take 1 tablet (40 mg total) by mouth once daily.  -     pantoprazole (PROTONIX) 40 MG tablet; Take 1 tablet (40 mg total) by mouth once daily.  -     Urinalysis; Future    Essential hypertension  -     CBC Auto Differential; Future  -      Comprehensive Metabolic Panel; Future  -     Urinalysis; Future  -     Lipid Panel; Future  -     CBC Auto Differential  -     Comprehensive Metabolic Panel  -     Lipid Panel    S/P coronary artery stent placement  Stable    Arthritis  -     meloxicam (MOBIC) 15 MG tablet; Take 1 tablet (15 mg total) by mouth once daily.    Class 3 severe obesity due to excess calories with serious comorbidity and body mass index (BMI) of 40.0 to 44.9 in adult  The patient is asked to make an attempt to improve diet and exercise patterns to aid in medical management of this problem.            Continue medication.   Coupon printed for good rx with pantoprazole.     Continue current medication.  Adding mobic.  No other NSAIDs with mobic. Tylenol is ok    Follow up in 3 months for wellness visit.

## 2024-10-14 DIAGNOSIS — J45.21 MILD INTERMITTENT ASTHMA WITH ACUTE EXACERBATION: ICD-10-CM

## 2024-10-15 DIAGNOSIS — J45.21 MILD INTERMITTENT ASTHMA WITH ACUTE EXACERBATION: ICD-10-CM

## 2024-10-15 DIAGNOSIS — N39.41 URGE INCONTINENCE OF URINE: ICD-10-CM

## 2024-10-15 RX ORDER — FLUTICASONE PROPIONATE AND SALMETEROL 100; 50 UG/1; UG/1
POWDER RESPIRATORY (INHALATION)
Qty: 60 EACH | Refills: 0 | OUTPATIENT
Start: 2024-10-15

## 2024-10-16 RX ORDER — FLUTICASONE PROPIONATE AND SALMETEROL 100; 50 UG/1; UG/1
1 POWDER RESPIRATORY (INHALATION) 2 TIMES DAILY
Qty: 60 EACH | Refills: 2 | Status: SHIPPED | OUTPATIENT
Start: 2024-10-16 | End: 2025-10-16

## 2024-10-16 RX ORDER — SOLIFENACIN SUCCINATE 10 MG/1
10 TABLET, FILM COATED ORAL
Qty: 90 TABLET | Refills: 0 | Status: SHIPPED | OUTPATIENT
Start: 2024-10-16

## 2024-11-12 ENCOUNTER — OFFICE VISIT (OUTPATIENT)
Dept: FAMILY MEDICINE | Facility: CLINIC | Age: 59
End: 2024-11-12
Payer: MEDICAID

## 2024-11-12 VITALS
OXYGEN SATURATION: 99 % | TEMPERATURE: 99 F | SYSTOLIC BLOOD PRESSURE: 138 MMHG | HEIGHT: 62 IN | WEIGHT: 216 LBS | DIASTOLIC BLOOD PRESSURE: 80 MMHG | HEART RATE: 64 BPM | BODY MASS INDEX: 39.75 KG/M2

## 2024-11-12 DIAGNOSIS — R71.8 MICROCYTIC RED BLOOD CELLS: ICD-10-CM

## 2024-11-12 DIAGNOSIS — E78.2 MIXED HYPERLIPIDEMIA: ICD-10-CM

## 2024-11-12 DIAGNOSIS — M19.90 ARTHRITIS: ICD-10-CM

## 2024-11-12 DIAGNOSIS — J45.21 MILD INTERMITTENT ASTHMA WITH ACUTE EXACERBATION: ICD-10-CM

## 2024-11-12 DIAGNOSIS — R09.82 POST-NASAL DRAINAGE: ICD-10-CM

## 2024-11-12 DIAGNOSIS — M23.51 RECURRENT RIGHT KNEE INSTABILITY: ICD-10-CM

## 2024-11-12 DIAGNOSIS — N39.41 URGE INCONTINENCE OF URINE: ICD-10-CM

## 2024-11-12 DIAGNOSIS — D56.3 BETA THALASSEMIA MINOR: ICD-10-CM

## 2024-11-12 DIAGNOSIS — Z00.00 ANNUAL PHYSICAL EXAM: Primary | ICD-10-CM

## 2024-11-12 PROCEDURE — 1160F RVW MEDS BY RX/DR IN RCRD: CPT | Mod: CPTII,,, | Performed by: NURSE PRACTITIONER

## 2024-11-12 PROCEDURE — 4010F ACE/ARB THERAPY RXD/TAKEN: CPT | Mod: CPTII,,, | Performed by: NURSE PRACTITIONER

## 2024-11-12 PROCEDURE — 3075F SYST BP GE 130 - 139MM HG: CPT | Mod: CPTII,,, | Performed by: NURSE PRACTITIONER

## 2024-11-12 PROCEDURE — 99214 OFFICE O/P EST MOD 30 MIN: CPT | Mod: PBBFAC,PN | Performed by: NURSE PRACTITIONER

## 2024-11-12 PROCEDURE — 99999 PR PBB SHADOW E&M-EST. PATIENT-LVL IV: CPT | Mod: PBBFAC,,, | Performed by: NURSE PRACTITIONER

## 2024-11-12 PROCEDURE — 1159F MED LIST DOCD IN RCRD: CPT | Mod: CPTII,,, | Performed by: NURSE PRACTITIONER

## 2024-11-12 PROCEDURE — 3008F BODY MASS INDEX DOCD: CPT | Mod: CPTII,,, | Performed by: NURSE PRACTITIONER

## 2024-11-12 PROCEDURE — 99396 PREV VISIT EST AGE 40-64: CPT | Mod: S$PBB,,, | Performed by: NURSE PRACTITIONER

## 2024-11-12 PROCEDURE — 3079F DIAST BP 80-89 MM HG: CPT | Mod: CPTII,,, | Performed by: NURSE PRACTITIONER

## 2024-11-12 RX ORDER — OXYBUTYNIN CHLORIDE 15 MG/1
15 TABLET, EXTENDED RELEASE ORAL DAILY
Qty: 30 TABLET | Refills: 5 | Status: SHIPPED | OUTPATIENT
Start: 2024-11-12 | End: 2025-11-12

## 2024-11-12 RX ORDER — FLUTICASONE PROPIONATE 50 MCG
1 SPRAY, SUSPENSION (ML) NASAL 2 TIMES DAILY
Qty: 16 G | Refills: 2 | Status: SHIPPED | OUTPATIENT
Start: 2024-11-12

## 2024-11-12 RX ORDER — MELOXICAM 15 MG/1
15 TABLET ORAL DAILY
Qty: 30 TABLET | Refills: 5 | Status: SHIPPED | OUTPATIENT
Start: 2024-11-12

## 2024-11-12 RX ORDER — ALBUTEROL SULFATE 90 UG/1
2 INHALANT RESPIRATORY (INHALATION) EVERY 6 HOURS PRN
Qty: 18 G | Refills: 2 | Status: SHIPPED | OUTPATIENT
Start: 2024-11-12

## 2024-11-12 RX ORDER — SOLIFENACIN SUCCINATE 10 MG/1
10 TABLET, FILM COATED ORAL DAILY
Qty: 90 TABLET | Refills: 1 | Status: SHIPPED | OUTPATIENT
Start: 2024-11-12 | End: 2024-11-12

## 2024-11-12 NOTE — PROGRESS NOTES
HPI: Zoila Moreland  is a 59 y.o. female who presents for annual physical .  No complaints at this time.  History of Present Illness    CHIEF COMPLAINT:  Patient presents for a wellness visit and reports right leg pain persisting for several months.    HPI:  Patient reports pain in the posterior aspect of her right leg, extending from the lower calf to midway up the posterior thigh. The pain potentially resulted from overextension 6-7 months ago when ascending a ladder, feeling a popping sensation after the first step. Initially, the injury severely impaired her ability to walk but had subsided to a minor ache over time. Recently, the pain has intensified. Patient has significant pain when standing, requiring a gradual rise to avoid falling. She also has difficulty lying down in bed, occasionally needing to keep the leg extended due to pain. As an athletic individual, she initially treated it with ice.    Patient currently has a cold, believed to be contracted from her grandchildren, with symptoms worsening at night. She reports ongoing urinary incontinence, occasionally requiring extra clothing when leaving home. She has urinary frequency despite taking Vesicare.    Patient mentions previous consultations with Dr. Bangura and treatment for plantar fasciitis.    MEDICATIONS:  Patient is on Albuterol inhaler as needed for asthma and a maintenance inhaler twice daily. She is also on Lipitor, Coreg, and Lisinopril, all prescribed by cardiology. She takes Meloxicam, Pantoprazole daily for acid reflux, Vesicare daily for urinary symptoms, and Spironolactone nightly for blood pressure. Patient is currently taking Sudafed for cold symptoms.    MEDICAL HISTORY:  Patient has a history of beta thalassemia and asthma. Patient underwent a hysterectomy a while ago. She is not using any contraception.    SURGICAL HISTORY:  Patient had a hysterectomy performed a while ago.    TEST RESULTS:  Patient had labs done last week,  showing stable blood counts and microcytic RBCs, which is consistent with her beta thalassemia history. A metabolic panel from the same time revealed a decrease in glucose from 104 to 94, with electrolytes within normal limits. Her liver and kidney functions were normal. Cholesterol levels were very low and within normal limits.    SOCIAL HISTORY:  Patient is .      ROS:  General: -fever, -chills, -fatigue, -weight gain, -weight loss  Eyes: -vision changes, -redness, -discharge  ENT: -ear pain, +nasal congestion, -sore throat  Cardiovascular: -chest pain, -palpitations, -lower extremity edema  Respiratory: +cough, -shortness of breath  Gastrointestinal: -abdominal pain, -nausea, -vomiting, -diarrhea, -constipation, -blood in stool  Genitourinary: -dysuria, -hematuria, +frequency, +urinary incontinence  Musculoskeletal: +joint pain, -muscle pain  Skin: -rash, -lesion  Neurological: -headache, -dizziness, -numbness, -tingling  Psychiatric: -anxiety, -depression, -sleep difficulty         Review of patient's allergies indicates:  No Known Allergies  Past Medical History:   Diagnosis Date    Allergy     Anemia 3/30/2020    Asthma     Fever blister     Hypertension      Past Surgical History:   Procedure Laterality Date    BILATERAL SALPINGOOPHORECTOMY Bilateral 5/18/2007    Torsion of ovaries and tubes    BREAST BIOPSY Right     (-)    CARDIAC CATHETERIZATION      cardiac stents  01/2017    1 stent    HYSTERECTOMY  1/13/2006    uterus and cervix due to AUB    OOPHORECTOMY      TRIGGER FINGER RELEASE Right 9/8/2020    Procedure: RELEASE, TRIGGER FINGER R-thumb;  Surgeon: Sam Bangura Jr., MD;  Location: [facility];  Service: Orthopedics;  Laterality: Right;     Family History   Problem Relation Name Age of Onset    Heart disease Mother      Diabetes Mother      Heart disease Father       Social History     Tobacco Use    Smoking status: Never    Smokeless tobacco: Never   Substance Use Topics    Alcohol use: No     Drug use: No      Health Maintenance Topics with due status: Not Due       Topic Last Completion Date    TETANUS VACCINE 05/22/2017    Colorectal Cancer Screening 08/16/2023    Mammogram 05/27/2024    Lipid Panel 11/04/2024    High Dose Statin 11/12/2024    Aspirin/Antiplatelet Therapy 11/12/2024    RSV Vaccine (Age 60+ and Pregnant patients) Not Due     Immunization History   Administered Date(s) Administered    COVID-19, MRNA, LN-S, PF (MODERNA FULL 0.5 ML DOSE) 05/18/2021, 06/21/2021    COVID-19, mRNA, LNP-S, bivalent booster, PF (Moderna Omicron)12 + YEARS 11/28/2022    Td (ADULT) 05/22/2017     OBJECTIVE:      Vitals:    11/12/24 1326   BP: 138/80   Pulse: 64   Temp: 98.5 °F (36.9 °C)     Physical Exam    General: No acute distress. Well-developed. Well-nourished.  Eyes: EOMI. Sclerae anicteric.  HENT: Normocephalic. Atraumatic. Nares patent. Moist oral mucosa. Postnasal drainage.  Ears: Small amount of cloudy fluid behind both eardrums. Bilateral EACs clear.  Cardiovascular: Regular rate. Regular rhythm. No murmurs. No rubs. No gallops. Normal S1, S2.  Respiratory: Normal respiratory effort. Clear to auscultation bilaterally. No rales. No rhonchi. No wheezing. Lungs clear.  Abdomen: Soft. Non-tender. Non-distended. Normoactive bowel sounds.  Musculoskeletal: No  obvious deformity.  Extremities: No lower extremity edema.  Neurological: Alert & oriented x3. No slurred speech. Normal gait.  Psychiatric: Normal mood. Normal affect. Good insight. Good judgment.  Skin: Warm. Dry. No rash.         Assessment:       Assessment & Plan    - Assessed leg pain, suspecting possible tendon or ligament damage from prior injury  - Evaluated cold symptoms, noting postnasal drainage and fluid behind eardrums without active infection  - Reviewed recent lab results, noting improvement in blood sugar and stable cholesterol levels  - Considered medication adjustments for urinary symptoms    LEG INJURY:  Explained potential risks of  untreated leg injury, including further damage.  Referred to Dr. Ham for orthopedic evaluation of leg pain.    HYSTERECTOMY COMPLICATIONS:  Discussed effects of hysterectomy on bladder function and urinary incontinence.    VITAMIN C DEFICIENCY:  Patient to use vitamin C supplements.    DEHYDRATION:  Recommend increasing hydration.    NASAL CONGESTION:  Started Afrin nasal spray at night for 3 days.  Refilled Flonase nasal spray.    ASTHMA:  Refilled albuterol inhaler.    URINARY INCONTINENCE:  Started oxybutynin (Ditropan) at maximum dose, to be taken at night, replacing Vesicare.    HYPERALDOSTERONISM:  Continued spironolactone, but instructed to take in the morning instead of at night.    LABS:  Ordered CBC, CMP, lipid panel, and thyroid level in 6 months at [facility] ([facility]    FOLLOW UP:  Follow up in 6 months for lab work review.  Contact the office if new symptoms develop or current condition worsens.         Plan:       Annual physical exam  Completed    Microcytic red blood cells  Stable    Beta thalassemia minor  Stable    Recurrent right knee instability  -     Ambulatory referral/consult to Orthopedics; Future; Expected date: 11/19/2024    Post-nasal drainage  -     fluticasone propionate (FLONASE) 50 mcg/actuation nasal spray; 1 spray (50 mcg total) by Each Nostril route 2 (two) times daily.  Dispense: 16 g; Refill: 2    Mild intermittent asthma with acute exacerbation  -     albuterol (PROVENTIL/VENTOLIN HFA) 90 mcg/actuation inhaler; Inhale 2 puffs into the lungs every 6 (six) hours as needed for Wheezing.  Dispense: 18 g; Refill: 2    Arthritis  -     meloxicam (MOBIC) 15 MG tablet; Take 1 tablet (15 mg total) by mouth once daily.  Dispense: 30 tablet; Refill: 5    Urge incontinence of urine  -     Discontinue: solifenacin (VESICARE) 10 MG tablet; Take 1 tablet (10 mg total) by mouth once daily. Appointment required for future refills  Dispense: 90 tablet; Refill: 1  -     oxybutynin  (DITROPAN XL) 15 MG TR24; Take 1 tablet (15 mg total) by mouth once daily.  Dispense: 30 tablet; Refill: 5        Patient counseled on age appropriate medical preventative services, age appropriate cancer screenings, nutrition, healthy diet, consistent exercise regimen and maintaining an active lifestyle.      Counseled on age appropriate vaccines and discussed upcoming health care needs based on age/gender.  Spent time with patient counseling on need for a good patient/doctor relationship moving forward.  Discussed use of common OTC medications and supplements.  Discussed common dietary aids and use of caffeine and the need for good sleep hygiene and stress management.    Follow up in about 6 months (around 5/12/2025).        This note was generated with the assistance of ambient listening technology. Verbal consent was obtained by the patient and accompanying visitor(s) for the recording of patient appointment to facilitate this note. I attest to having reviewed and edited the generated note for accuracy, though some syntax or spelling errors may persist. Please contact the author of this note for any clarification.      11/12/2024 HUBERT Mack, GUANAKOP-C

## 2024-11-20 ENCOUNTER — OFFICE VISIT (OUTPATIENT)
Dept: ORTHOPEDICS | Facility: CLINIC | Age: 59
End: 2024-11-20
Payer: MEDICAID

## 2024-11-20 ENCOUNTER — HOSPITAL ENCOUNTER (OUTPATIENT)
Dept: RADIOLOGY | Facility: HOSPITAL | Age: 59
Discharge: HOME | End: 2024-11-20
Attending: PHYSICIAN ASSISTANT
Payer: MEDICAID

## 2024-11-20 VITALS — WEIGHT: 216.06 LBS | BODY MASS INDEX: 39.76 KG/M2 | HEIGHT: 62 IN

## 2024-11-20 DIAGNOSIS — M23.203 DEGENERATIVE TEAR OF MEDIAL MENISCUS, RIGHT: ICD-10-CM

## 2024-11-20 DIAGNOSIS — M22.41 CHONDROMALACIA PATELLAE, RIGHT: Primary | ICD-10-CM

## 2024-11-20 DIAGNOSIS — M23.51 RECURRENT RIGHT KNEE INSTABILITY: ICD-10-CM

## 2024-11-20 PROCEDURE — 99214 OFFICE O/P EST MOD 30 MIN: CPT | Mod: PBBFAC,25,PN | Performed by: PHYSICIAN ASSISTANT

## 2024-11-20 PROCEDURE — 73562 X-RAY EXAM OF KNEE 3: CPT | Mod: 26,RT,, | Performed by: RADIOLOGY

## 2024-11-20 PROCEDURE — 99999PBSHW PR PBB SHADOW TECHNICAL ONLY FILED TO HB: Mod: PBBFAC,,,

## 2024-11-20 PROCEDURE — 73562 X-RAY EXAM OF KNEE 3: CPT | Mod: TC,PN,RT

## 2024-11-20 PROCEDURE — 99999 PR PBB SHADOW E&M-EST. PATIENT-LVL IV: CPT | Mod: PBBFAC,,, | Performed by: PHYSICIAN ASSISTANT

## 2024-11-20 PROCEDURE — 20610 DRAIN/INJ JOINT/BURSA W/O US: CPT | Mod: PBBFAC,PN | Performed by: PHYSICIAN ASSISTANT

## 2024-11-20 RX ORDER — TRIAMCINOLONE ACETONIDE 40 MG/ML
40 INJECTION, SUSPENSION INTRA-ARTICULAR; INTRAMUSCULAR
Status: DISCONTINUED | OUTPATIENT
Start: 2024-11-20 | End: 2024-11-20 | Stop reason: HOSPADM

## 2024-11-20 RX ADMIN — TRIAMCINOLONE ACETONIDE 40 MG: 40 INJECTION, SUSPENSION INTRA-ARTICULAR; INTRAMUSCULAR at 11:11

## 2024-11-20 NOTE — PROCEDURES
Large Joint Aspiration/Injection: R knee    Date/Time: 11/20/2024 11:00 AM    Performed by: Al Campos PA  Authorized by: Al Campos PA    Consent Done?:  Yes (Verbal)  Indications:  Pain  Site marked: the procedure site was marked    Timeout: prior to procedure the correct patient, procedure, and site was verified    Prep: patient was prepped and draped in usual sterile fashion      Local anesthesia used?: Yes    Local anesthetic:  Lidocaine 1% without epinephrine    Details:  Needle Size:  25 G  Ultrasonic Guidance for needle placement?: No    Location:  Knee  Site:  R knee  Medications:  40 mg triamcinolone acetonide 40 mg/mL  Patient tolerance:  Patient tolerated the procedure well with no immediate complications

## 2024-11-20 NOTE — PROGRESS NOTES
[facility] ORTHOPEDICS  [address]  [place] [address]  Phone: [phone]   [phone]    Patient's PCP: Holli Mancuso FNP-C  Referring Provider: Aaareferral Self    Subjective:      Chief Complaint:   Chief Complaint   Patient presents with    Right Knee - Pain     Patient is here with complaints of Recurrent Right knee pain & instability x 6 months, states she felt a pop while climbing a ladder, pain is behind the knee       Past Medical History:   Diagnosis Date    Allergy     Anemia 3/30/2020    Asthma     Fever blister     Hypertension        Past Surgical History:   Procedure Laterality Date    BILATERAL SALPINGOOPHORECTOMY Bilateral 5/18/2007    Torsion of ovaries and tubes    BREAST BIOPSY Right     (-)    CARDIAC CATHETERIZATION      cardiac stents  01/2017    1 stent    HYSTERECTOMY  1/13/2006    uterus and cervix due to AUB    OOPHORECTOMY      TRIGGER FINGER RELEASE Right 9/8/2020    Procedure: RELEASE, TRIGGER FINGER R-thumb;  Surgeon: Sam Bangura Jr., MD;  Location: [facility];  Service: Orthopedics;  Laterality: Right;       Current Outpatient Medications   Medication Sig    albuterol (PROVENTIL/VENTOLIN HFA) 90 mcg/actuation inhaler Inhale 2 puffs into the lungs every 6 (six) hours as needed for Wheezing.    alirocumab (PRALUENT PEN) 150 mg/mL PnIj Inject 2 mLs (300 mg total) into the skin every 30 days.    ascorbic acid, vitamin C, (VITAMIN C) 1000 MG tablet Take 1,000 mg by mouth once daily.    aspirin 81 MG Chew Take 1 tablet (81 mg total) by mouth once daily.    atorvastatin (LIPITOR) 80 MG tablet TAKE 1 TABLET BY MOUTH EVERY  EVENING    calcium-vitamin D (OSCAL) 250 (625)-125 mg-unit per tablet Take 1 tablet by mouth once daily.    carvediloL (COREG) 25 MG tablet TAKE 1 TABLET BY MOUTH TWICE DAILY WITH MEALS    ezetimibe (ZETIA) 10 mg tablet Take 1 tablet (10 mg total) by mouth once daily.    fluticasone propionate (FLONASE) 50 mcg/actuation nasal spray 1 spray (50 mcg  total) by Each Nostril route 2 (two) times daily.    fluticasone-salmeterol diskus inhaler 100-50 mcg Inhale 1 puff into the lungs 2 (two) times daily. Controller    LIDOcaine viscous HCl 2% (LIDOCAINE VISCOUS) 2 % Soln by Mucous Membrane route every 3 (three) hours. Gargle 5 ml in the throat every 3 hours as needed for sore throat.    lisinopriL (PRINIVIL,ZESTRIL) 40 MG tablet TAKE 1 TABLET BY MOUTH ONCE DAILY IN THE EVENING    meloxicam (MOBIC) 15 MG tablet Take 1 tablet (15 mg total) by mouth once daily.    oxybutynin (DITROPAN XL) 15 MG TR24 Take 1 tablet (15 mg total) by mouth once daily.    pantoprazole (PROTONIX) 40 MG tablet Take 1 tablet (40 mg total) by mouth once daily.    spironolactone (ALDACTONE) 25 MG tablet Take 1 tablet by mouth once daily    valACYclovir (VALTREX) 1000 MG tablet 2 tablets PO at onset of fever blister. Repeat 12 hours later x1 dose. Repeat for repeat occurrence.     No current facility-administered medications for this visit.       Review of patient's allergies indicates:  No Known Allergies    Family History   Problem Relation Name Age of Onset    Heart disease Mother      Diabetes Mother      Heart disease Father         Social History     Socioeconomic History    Marital status:      Spouse name: Danny    Number of children: 2    Years of education: 12th   Occupational History    Occupation: homemaker   Tobacco Use    Smoking status: Never    Smokeless tobacco: Never   Substance and Sexual Activity    Alcohol use: No    Drug use: No    Sexual activity: Yes     Partners: Male     Birth control/protection: Surgical   Other Topics Concern    Are you pregnant or think you may be? No    Breast-feeding No       Prior to meeting with the patient I reviewed the medical chart in [facility]. This included reviewing the previous progress notes from our office, review of the patient's last appointment with their primary care provider, review of any visits to the emergency room, and review of  any pain management appointments or procedures.    History of present illness: 59 y.o. female,  presents to clinic today for evaluation of complaints of right knee pain.  She recalls an event when she was coming down a ladder about 6 months ago she felt a pop in the right knee.  Treated this conservatively rest ice compression elevation and did well for a period of time.  However pain has returned, it has become more persistent and nagging.  She does complain of some instability.  Tender over the medial aspect of the right knee.       Review of Systems:    Constitutional: Negative for chills, fever and weight loss.   HENT: Negative for congestion.    Eyes: Negative for discharge and redness.   Respiratory: Negative for cough and shortness of breath.    Cardiovascular: Negative for chest pain.   Gastrointestinal: Negative for nausea and vomiting.   Musculoskeletal: See HPI.   Skin: Negative for rash.   Neurological: Negative for headaches.   Endo/Heme/Allergies: Does not bruise/bleed easily.   Psychiatric/Behavioral: The patient is not nervous/anxious.    All other systems reviewed and are negative.       Objective:      Physical Examination:    Vital Signs:  There were no vitals filed for this visit.    Body mass index is 39.52 kg/m².    This a well-developed, well nourished patient in no acute distress.  They are alert and oriented and cooperative to examination.     Examination of the right knee, no effusion, skin is dry and intact, no erythema or ecchymosis, no signs symptoms of infection.  Tender over the medial joint line, pain with Graham's testing.  Stable otherwise to anterior-posterior varus and valgus stress.  Homans signs negative, straight leg raise is negative.  Distally neurovascularly intact.      Pertinent New Results:          XRAY Report / Interpretation:   AP lateral sunrise views of the right knee taken today in the office demonstrate some lateral displacement of the patellas bilaterally.  Some  early spurring patellofemoral joint.          Assessment:       1. Chondromalacia patellae, right    2. Degenerative tear of medial meniscus, right    3. Recurrent right knee instability      Plan:     Chondromalacia patellae, right    Degenerative tear of medial meniscus, right  -     X-Ray Knee 3 View Right  -     Large Joint Aspiration/Injection: R knee  -     Ambulatory referral/consult to Physical/Occupational Therapy; Future; Expected date: 11/27/2024    Recurrent right knee instability  -     Ambulatory referral/consult to Orthopedics        Follow up for 6 wks f/u, right knee inj and PT f/u.    Medial meniscal tear, chondromalacia patella, we injected the right knee today we are going to do some physical therapy for strengthening we will check her back in 6 weeks.  If symptoms are not better we will proceed with an MRI.    The patient and I had a thorough discussion today.  We discussed the working diagnosis as well as several other potential alternative diagnoses.  We discussed treatment options, both conservative and surgical.  Conservative treatment options would include things such as activity modifications, workplace modifications, a period of rest, oral versus topical over the counter and oral versus topical prescription anti-inflammatory medications, physical therapy / occupational therapy, splinting / bracing, immobilization, corticosteroid injections, and others.      [unfilled]  CLINTON Cruz PA-C        EMR Statement:  Please note that portions of this patient encounter record were imported from the patients electronic medical record and that others were dictated using voice recognition software. For these reasons grammatical errors, nonsensical language, and apparently contradictory statements may be present.  These should be disregarded or interpreted with respect to the context of the document.

## 2024-12-03 ENCOUNTER — CLINICAL SUPPORT (OUTPATIENT)
Dept: REHABILITATION | Facility: HOSPITAL | Age: 59
End: 2024-12-03
Payer: MEDICAID

## 2024-12-03 DIAGNOSIS — R29.898 DECREASED STRENGTH OF LOWER EXTREMITY: ICD-10-CM

## 2024-12-03 DIAGNOSIS — M23.203 DEGENERATIVE TEAR OF MEDIAL MENISCUS, RIGHT: ICD-10-CM

## 2024-12-03 DIAGNOSIS — M25.661 DECREASED RANGE OF MOTION (ROM) OF RIGHT KNEE: Primary | ICD-10-CM

## 2024-12-03 PROCEDURE — 97162 PT EVAL MOD COMPLEX 30 MIN: CPT | Mod: PN

## 2024-12-03 NOTE — PLAN OF CARE
[facility]   Physical Therapy Initial Evaluation      Name: Zoila Moreland  Clinic Number: [id number]    Therapy Diagnosis:   Encounter Diagnoses   Name Primary?    Degenerative tear of medial meniscus, right     Decreased range of motion (ROM) of right knee Yes    Decreased strength of lower extremity         Physician: Al Campos PA    Physician Orders: PT Eval and Treat   Medical Diagnosis from Referral: M23.203 (ICD-10-CM) - Degenerative tear of medial meniscus, right   Evaluation Date: 12/3/2024  Authorization Period Expiration: 11/20/2025  Plan of Care Expiration: 3/3/2025  Progress Note Due: 1/3/2025  Date of Surgery: none   Visit # / Visits authorized: 1/ 1  FOTO: 1/ 3    Precautions: Standard and CAD      Time In: 1105  Time Out: 1150  Total Billable Time: 45 minutes    Subjective     Date of onset: stepped up a ladder and she felt a pop in the R knee and she felt pain in the back of the knee immediately    History of current condition - Zoila reports: R knee pain that she thinks she might have over extended the knee 6 months ago. She has some pain in the anterior knee region. She did this about 6 months ago and did some ice and stayed off it and it got better. Recently about 2 weeks ago it started to get worse again. Received injection on the 20th and this has helped. Denies numbness and tingling. When she drives and is pushing down on the gas pedal, she notices the pain. This was present when the pain initially started as well. She has history of back/sciatica nerve on the L side.      Falls: 0    Imaging:     FINDINGS:  No acute fracture.  Sessile osteo chondroma arises from the medial aspect of the left proximal tibial metaphysis.  This is unchanged.  No malalignment.  Minimal degenerate marginal spurs in the knees.  No right knee joint effusion.    Prior Therapy: yes for her back   Social History: one story home lives with their spouse  Occupation: not  "currently working   Prior Level of Function: independent   Current Level of Function: climbing up ladders not sure if it is more fear or the pain     Pain:  Current 3/10, worst 3/10, best 3/10   Location: right knee  Description: Aching  Aggravating Factors: bending the knee  Easing Factors:  injection has improved the knee, keeping the knee straight, no medication     Patients goals: "I just want it better"      Medical History:   Past Medical History:   Diagnosis Date    Allergy     Anemia 3/30/2020    Asthma     Fever blister     Hypertension        Surgical History:   Zoila Moreland  has a past surgical history that includes Bilateral salpingoophorectomy (Bilateral, 5/18/2007); Hysterectomy (1/13/2006); cardiac stents (01/2017); Cardiac catheterization; Oophorectomy; Breast biopsy (Right); and Trigger finger release (Right, 9/8/2020).    Medications:   Zoila has a current medication list which includes the following prescription(s): albuterol, alirocumab, ascorbic acid (vitamin c), aspirin, atorvastatin, calcium-vitamin d, carvedilol, ezetimibe, fluticasone propionate, fluticasone-salmeterol 100-50 mcg/dose, lidocaine viscous hcl 2%, lisinopril, meloxicam, oxybutynin, pantoprazole, spironolactone, and valacyclovir.    Allergies:   Review of patient's allergies indicates:  No Known Allergies     Objective      Structural/Postural Inspection: patient propped up leg on mat into knee extension throughout history taking (stays up 90% of the day unless patient is walking)     Active Range of Motion (AROM)/Passive Range of Motion (PROM):     Hip/Knee/Ankle (Degrees) AROM (Right) PROM (Right) AROM (Left) PROM (Left) Comments   knee Flexion 130  135   Pain posterior R knee    Knee extension 0  0       Joint Accessory: normal end feel flexion and extension, pain with overpressure knee flexion and extension     Manual Muscle Testing:     RLE Strength Grade LLE Strength Grade   Hip Flexion 4+/5 Hip Flexion 4+/5 "   Hip Extension 4/5 Hip Extension 4/5   Hip Abduction 4/5 Hip Abduction 4/5   Hip Adduction 4/5 Hip Adduction 4/5   Hip Internal Rotation NT Hip Internal Rotation NT   Hip External Rotation NT Hip External Rotation NT   Knee Flexion 4-/5, pain  Knee Flexion 4+/5   Knee Extension 5/5 Knee Extension 5/5   Ankle Dorsiflexion 5/5 Ankle Dorsiflexion 5/5   Ankle Plantarflexion NT Ankle Plantarflexion NT                 Special Tests:    Neg Brandi's test    Repeated movements:     Baselines: pain with squat, step up and step down with opp. LE, passive R knee flexion overpressure, resisted knee flexion     Seated knee extension self overpressure 10 repetitions, no effect    Second set 10 repetitions, no effect     Supine repeated knee flexion therapist overpressure 10 repetitions, end range pain, better with squat and step up , no effect on other baselines     Self repeated knee flexion partial WB on step 10 repetitions, increase worse    Repeated seated active knee extension 2 x 10 , decrease better in medial knee pain with squat and step up    Given this for HEP     Movement Analysis:  SLS on Right: 6 second, no pain   SLS on Left: 6 second, no pain   Sit <> Stand: no complaints   Squat: pain R knee, limited knee flexion    Stairs: pain anterior knee with step up and down with LLE   Ambulation: no gait deficits noted, independence     Sensation: NT neg for numbness and tingling     Intake Outcome Measure for FOTO 12/3/2024 Survey    Therapist reviewed FOTO scores for Zoila Moreland on 12/3/2024.   FOTO report - see Media section or FOTO account episode details.    Intake Score: 46%; 62% predicted        Treatment     Total Treatment time (time-based codes) separate from Evaluation: 5 minutes     Zoila received the treatments listed below:      therapeutic exercises to develop strength, endurance, ROM, flexibility, posture, and core stabilization for 5 minutes including:    Educated on repeated  movements  Educated on HEP and dosage / repetitions / frequency     Patient Education and Home Exercises     Education provided:   - HEP   - therapy goals and POC     Written Home Exercises Provided: Yes. Exercises were reviewed and Zoila was able to demonstrate them prior to the end of the session.  Zoila demonstrated good  understanding of the education provided. See EMR under Patient Instructions for exercises provided during therapy sessions.    Assessment     Zoila is a 59 y.o. female referred to outpatient Physical Therapy with a medical diagnosis of M23.203 (ICD-10-CM) - Degenerative tear of medial meniscus, right.     Patient presents with c/o chronic R knee pain that started after climbing a step ladder in which she heard a pop with immediate pain following in the posterior knee. She arrives with functional limitations of bending the R knee with squatting and navigating a ladder. She demonstrates limited R knee flexion with pain with overpressure. She had pain with step up and squatting today which were used as baselines for assessing response to repeated movements to determine if patient has a directional preference. She responded well to repeated passive R knee flexion with therapist overpressure but worse following repeated knee flexion in partial WB position. Therefore, returned to repeated extension actively verse overpressure with improvement in WB baselines. She also reports preference for keeping her knee extended verse in flexed position. HEP included repeated active knee extension. She also demonstrates decreased LE strength. She will benefit from skilled Physical Therapist services to address R knee pain to improve functional mobility and return to PLOF.     Patient prognosis is Good.   Patient will benefit from skilled outpatient Physical Therapy to address the deficits stated above and in the chart below, provide patient /family education, and to maximize patientt's level of independence.      Plan of care discussed with patient: Yes  Patient's spiritual, cultural and educational needs considered and patient is agreeable to the plan of care and goals as stated below:     Anticipated Barriers for therapy: co morbidities, lifestyle     Medical Necessity is demonstrated by the following  History  Co-morbidities and personal factors that may impact the plan of care [] LOW: no personal factors / co-morbidities  [] MODERATE: 1-2 personal factors / co-morbidities  [x] HIGH: 3+ personal factors / co-morbidities    Moderate / High Support Documentation:   Co-morbidities affecting plan of care: CAD, HTN, BMI    Personal Factors:   lifestyle     Examination  Body Structures and Functions, activity limitations and participation restrictions that may impact the plan of care [] LOW: addressing 1-2 elements  [] MODERATE: 3+ elements  [x] HIGH: 4+ elements (please support below)    Moderate / High Support Documentation: posture, range of motion, strength, symmetry      Clinical Presentation [] LOW: stable  [x] MODERATE: Evolving  [] HIGH: Unstable     Decision Making/ Complexity Score: moderate       Goals:    STG  Weeks/Visits Date Established  Goal Status    1.Patient will report >/= 30% improvement since evaluation to improve return to PLOF  3 weeks/ 6 visits  12/3/2024   Progressing     12/3/2024   2. Patient will increase R knee flexion to full without pain to improve community mobility  3 weeks/ 6 visits  12/3/2024   Progressing     12/3/2024   3.Patient will report >/= 50/100 on FOTO to improve quality of life  3 weeks/ 6 visits  12/3/2024   Progressing     12/3/2024   4. Pt will demonstrate and verbalize compliance and independence with initial HEP to improve therapy outcomes  3 weeks/ 6 visits  12/3/2024   Progressing     12/3/2024   5. Pt will demonstrate independence with reducing or controlling symptoms with ther ex, movement, or position independently, able to reduce pain 1-2 points on pain scale using  strategies taught in therapy 3 weeks/ 6 visits  12/3/2024   Progressing     12/3/2024     LTG Weeks/Visits Date Established  Goal Status    Patient will report >/= 50% improvement since evaluation to improve return to PLOF  6 weeks/ 12 visits  12/3/2024   Progressing     12/3/2024   2. Patient will perform squat without pain to improve functional mobility  6 weeks/ 12 visits  12/3/2024     Progressing     12/3/2024   3.Patient will report >/= 62/100 on FOTO to improve quality of life  6 weeks/ 12 visits  12/3/2024   Progressing     12/3/2024   4.Pt will demonstrate and verbalize compliance and independence with  discharge HEP to improve therapy outcomes 6 weeks/ 12 visits  12/3/2024   Progressing     12/3/2024   5. Pt will navigate stairs /ladder without pain to improve community mobility  6 weeks/ 12 visits  12/3/2024   Progressing     12/3/2024   6. Patient will increase RLE strength to >/= 4+/5 to improve active lifestyle  6 weeks/ 12 visits  12/3/2024 Progressing     12/3/2024       Plan     Plan of care Certification: 12/3/2024 to 3/3/2025.    Outpatient Physical Therapy 2 times weekly for 6 weeks to include the following interventions: Gait Training, Manual Therapy, Moist Heat/ Ice, Neuromuscular Re-ed, Patient Education, Therapeutic Activities, and Therapeutic Exercise.     Megan Boles, PT        Physician's Signature: _________________________________________ Date: ________________

## 2025-01-11 DIAGNOSIS — J45.21 MILD INTERMITTENT ASTHMA WITH ACUTE EXACERBATION: ICD-10-CM

## 2025-01-15 RX ORDER — FLUTICASONE PROPIONATE AND SALMETEROL 100; 50 UG/1; UG/1
POWDER RESPIRATORY (INHALATION)
Qty: 60 EACH | Refills: 5 | Status: SHIPPED | OUTPATIENT
Start: 2025-01-15

## 2025-02-06 ENCOUNTER — OFFICE VISIT (OUTPATIENT)
Dept: FAMILY MEDICINE | Facility: CLINIC | Age: 60
End: 2025-02-06
Payer: MEDICAID

## 2025-02-06 ENCOUNTER — HOSPITAL ENCOUNTER (OUTPATIENT)
Dept: RADIOLOGY | Facility: HOSPITAL | Age: 60
Discharge: HOME | End: 2025-02-06
Attending: NURSE PRACTITIONER
Payer: MEDICAID

## 2025-02-06 ENCOUNTER — LAB VISIT (OUTPATIENT)
Dept: LAB | Facility: HOSPITAL | Age: 60
End: 2025-02-06
Attending: NURSE PRACTITIONER
Payer: MEDICAID

## 2025-02-06 VITALS
WEIGHT: 219.38 LBS | HEIGHT: 62 IN | OXYGEN SATURATION: 96 % | DIASTOLIC BLOOD PRESSURE: 58 MMHG | BODY MASS INDEX: 40.37 KG/M2 | SYSTOLIC BLOOD PRESSURE: 98 MMHG | TEMPERATURE: 98 F

## 2025-02-06 DIAGNOSIS — R52 BODY ACHES: ICD-10-CM

## 2025-02-06 DIAGNOSIS — R05.2 SUBACUTE COUGH: ICD-10-CM

## 2025-02-06 DIAGNOSIS — R07.0 THROAT PAIN: Primary | ICD-10-CM

## 2025-02-06 DIAGNOSIS — J45.20 MILD INTERMITTENT ASTHMA IN ADULT WITHOUT COMPLICATION: ICD-10-CM

## 2025-02-06 DIAGNOSIS — R51.9 ACUTE NONINTRACTABLE HEADACHE, UNSPECIFIED HEADACHE TYPE: ICD-10-CM

## 2025-02-06 DIAGNOSIS — J10.1 INFLUENZA A: ICD-10-CM

## 2025-02-06 LAB
BASOPHILS # BLD AUTO: 0.02 K/UL (ref 0–0.2)
BASOPHILS NFR BLD: 0.4 % (ref 0–1.9)
CTP QC/QA: YES
DIFFERENTIAL METHOD BLD: ABNORMAL
EOSINOPHIL # BLD AUTO: 0 K/UL (ref 0–0.5)
EOSINOPHIL NFR BLD: 0.6 % (ref 0–8)
ERYTHROCYTE [DISTWIDTH] IN BLOOD BY AUTOMATED COUNT: 18.1 % (ref 11.5–14.5)
HCT VFR BLD AUTO: 37.5 % (ref 37–48.5)
HGB BLD-MCNC: 11.4 G/DL (ref 12–16)
IMM GRANULOCYTES # BLD AUTO: 0.01 K/UL (ref 0–0.04)
IMM GRANULOCYTES NFR BLD AUTO: 0.2 % (ref 0–0.5)
LYMPHOCYTES # BLD AUTO: 1.7 K/UL (ref 1–4.8)
LYMPHOCYTES NFR BLD: 34.3 % (ref 18–48)
MCH RBC QN AUTO: 20.1 PG (ref 27–31)
MCHC RBC AUTO-ENTMCNC: 30.4 G/DL (ref 32–36)
MCV RBC AUTO: 66 FL (ref 82–98)
MOLECULAR STREP A: NEGATIVE
MONOCYTES # BLD AUTO: 0.5 K/UL (ref 0.3–1)
MONOCYTES NFR BLD: 11 % (ref 4–15)
NEUTROPHILS # BLD AUTO: 2.6 K/UL (ref 1.8–7.7)
NEUTROPHILS NFR BLD: 53.5 % (ref 38–73)
NRBC BLD-RTO: 0 /100 WBC
PLATELET # BLD AUTO: 263 K/UL (ref 150–450)
PMV BLD AUTO: 10.9 FL (ref 9.2–12.9)
POC MOLECULAR INFLUENZA A AGN: POSITIVE
POC MOLECULAR INFLUENZA B AGN: NEGATIVE
RBC # BLD AUTO: 5.66 M/UL (ref 4–5.4)
SARS-COV-2 RDRP RESP QL NAA+PROBE: NEGATIVE
WBC # BLD AUTO: 4.92 K/UL (ref 3.9–12.7)

## 2025-02-06 PROCEDURE — 71046 X-RAY EXAM CHEST 2 VIEWS: CPT | Mod: TC

## 2025-02-06 PROCEDURE — 4010F ACE/ARB THERAPY RXD/TAKEN: CPT | Mod: CPTII,,, | Performed by: NURSE PRACTITIONER

## 2025-02-06 PROCEDURE — 99215 OFFICE O/P EST HI 40 MIN: CPT | Mod: S$PBB,,, | Performed by: NURSE PRACTITIONER

## 2025-02-06 PROCEDURE — 1159F MED LIST DOCD IN RCRD: CPT | Mod: CPTII,,, | Performed by: NURSE PRACTITIONER

## 2025-02-06 PROCEDURE — 3008F BODY MASS INDEX DOCD: CPT | Mod: CPTII,,, | Performed by: NURSE PRACTITIONER

## 2025-02-06 PROCEDURE — 3078F DIAST BP <80 MM HG: CPT | Mod: CPTII,,, | Performed by: NURSE PRACTITIONER

## 2025-02-06 PROCEDURE — 99999PBSHW POCT STREP A MOLECULAR: Mod: PBBFAC,,,

## 2025-02-06 PROCEDURE — 99999 PR PBB SHADOW E&M-EST. PATIENT-LVL III: CPT | Mod: PBBFAC,,, | Performed by: NURSE PRACTITIONER

## 2025-02-06 PROCEDURE — 71046 X-RAY EXAM CHEST 2 VIEWS: CPT | Mod: 26,,, | Performed by: RADIOLOGY

## 2025-02-06 PROCEDURE — 99999PBSHW: Mod: PBBFAC,,,

## 2025-02-06 PROCEDURE — 99999PBSHW POCT INFLUENZA A/B MOLECULAR: Mod: PBBFAC,,,

## 2025-02-06 PROCEDURE — 99999PBSHW PR PBB SHADOW TECHNICAL ONLY FILED TO HB: Mod: PBBFAC,,,

## 2025-02-06 PROCEDURE — 87502 INFLUENZA DNA AMP PROBE: CPT | Mod: PBBFAC,PN | Performed by: NURSE PRACTITIONER

## 2025-02-06 PROCEDURE — 96372 THER/PROPH/DIAG INJ SC/IM: CPT | Mod: PBBFAC,PN

## 2025-02-06 PROCEDURE — 85025 COMPLETE CBC W/AUTO DIFF WBC: CPT | Performed by: NURSE PRACTITIONER

## 2025-02-06 PROCEDURE — 3074F SYST BP LT 130 MM HG: CPT | Mod: CPTII,,, | Performed by: NURSE PRACTITIONER

## 2025-02-06 PROCEDURE — 36415 COLL VENOUS BLD VENIPUNCTURE: CPT | Performed by: NURSE PRACTITIONER

## 2025-02-06 PROCEDURE — 87635 SARS-COV-2 COVID-19 AMP PRB: CPT | Mod: PBBFAC,PN | Performed by: NURSE PRACTITIONER

## 2025-02-06 PROCEDURE — 87651 STREP A DNA AMP PROBE: CPT | Mod: PBBFAC,PN | Performed by: NURSE PRACTITIONER

## 2025-02-06 PROCEDURE — 99213 OFFICE O/P EST LOW 20 MIN: CPT | Mod: PBBFAC,PN | Performed by: NURSE PRACTITIONER

## 2025-02-06 RX ORDER — CEFTRIAXONE 1 G/1
0.5 INJECTION, POWDER, FOR SOLUTION INTRAMUSCULAR; INTRAVENOUS
Status: COMPLETED | OUTPATIENT
Start: 2025-02-06 | End: 2025-02-06

## 2025-02-06 RX ORDER — SOLIFENACIN SUCCINATE 10 MG/1
10 TABLET, FILM COATED ORAL
COMMUNITY
Start: 2025-01-15

## 2025-02-06 RX ORDER — DEXAMETHASONE SODIUM PHOSPHATE 4 MG/ML
4 INJECTION, SOLUTION INTRA-ARTICULAR; INTRALESIONAL; INTRAMUSCULAR; INTRAVENOUS; SOFT TISSUE
Status: COMPLETED | OUTPATIENT
Start: 2025-02-06 | End: 2025-02-06

## 2025-02-06 RX ORDER — PROMETHAZINE HYDROCHLORIDE AND DEXTROMETHORPHAN HYDROBROMIDE 6.25; 15 MG/5ML; MG/5ML
7.5 SYRUP ORAL EVERY 4 HOURS PRN
Qty: 120 ML | Refills: 0 | Status: SHIPPED | OUTPATIENT
Start: 2025-02-06 | End: 2025-02-13

## 2025-02-06 RX ORDER — OSELTAMIVIR PHOSPHATE 75 MG/1
75 CAPSULE ORAL 2 TIMES DAILY
Qty: 10 CAPSULE | Refills: 0 | Status: SHIPPED | OUTPATIENT
Start: 2025-02-06 | End: 2025-02-11

## 2025-02-06 RX ORDER — PREDNISONE 10 MG/1
TABLET ORAL
Qty: 10 TABLET | Refills: 0 | Status: SHIPPED | OUTPATIENT
Start: 2025-02-07

## 2025-02-06 RX ORDER — CEFDINIR 300 MG/1
300 CAPSULE ORAL 2 TIMES DAILY
Qty: 20 CAPSULE | Refills: 0 | Status: SHIPPED | OUTPATIENT
Start: 2025-02-07 | End: 2025-02-17

## 2025-02-06 RX ADMIN — DEXAMETHASONE SODIUM PHOSPHATE 4 MG: 4 INJECTION INTRA-ARTICULAR; INTRALESIONAL; INTRAMUSCULAR; INTRAVENOUS; SOFT TISSUE at 02:02

## 2025-02-06 RX ADMIN — CEFTRIAXONE SODIUM 0.5 G: 1 INJECTION, POWDER, FOR SOLUTION INTRAMUSCULAR; INTRAVENOUS at 02:02

## 2025-02-06 NOTE — PROGRESS NOTES
This dictation has been generated using Modal Fluency Dictation some phonetic errors may occur. Please contact author for clarification if needed.     1. Throat pain  -     POCT Strep A, Molecular    2. Subacute cough  -     POCT Influenza A/B Molecular  -     POCT COVID-19 Rapid Screening  -     CBC Auto Differential; Future; Expected date: 02/06/2025  -     X-Ray Chest PA And Lateral; Future; Expected date: 02/06/2025  -     promethazine-dextromethorphan (PROMETHAZINE-DM) 6.25-15 mg/5 mL Syrp; Take 7.5 mLs by mouth every 4 (four) hours as needed (night time cough).  Dispense: 120 mL; Refill: 0    3. Acute nonintractable headache, unspecified headache type    4. Body aches    5. Influenza A  -     promethazine-dextromethorphan (PROMETHAZINE-DM) 6.25-15 mg/5 mL Syrp; Take 7.5 mLs by mouth every 4 (four) hours as needed (night time cough).  Dispense: 120 mL; Refill: 0    Other orders  -     dexAMETHasone injection 4 mg  -     cefTRIAXone injection 0.5 g  -     oseltamivir (TAMIFLU) 75 MG capsule; Take 1 capsule (75 mg total) by mouth 2 (two) times daily. for 5 days  Dispense: 10 capsule; Refill: 0  -     cefdinir (OMNICEF) 300 MG capsule; Take 1 capsule (300 mg total) by mouth 2 (two) times daily. for 10 days  Dispense: 20 capsule; Refill: 0  -     predniSONE (DELTASONE) 10 MG tablet; 4 by mouth once today, then 3 by mouth tomorrow, then 2 by mouth on day 3, then one by mouth on the last day.  Dispense: 10 tablet; Refill: 0         Throat pain body aches asthma exacerbation.  Check labs as above.  COVID strep negative.  Flu A positive.  Given the ill-defined period of time treating with Tamiflu.  Patient's symptoms recently worsened unclear if that is when flu a developed.  Alternately could be biphasic presentation with bacterial etiology.  Given she has  asthma treat with an abundance of care antibiotic steroids as above.  We will follow closely with patient regarding symptom response.  Discussed appropriate  follow-up parameters.    I will review All results and chest x-ray images and address accordingly.   Chest x-ray without consolidation no evidence of pneumonia.    No follow-ups on file.    ________________________________________________________________  ________________________________________________________________      Chief Complaint   Patient presents with    Generalized Body Aches    Cough    Sore Throat     History of present illness    This 59 y.o. presents today for complaint of   Sore throat headache ear pain cough body aches.  Symptoms started 2 weeks ago but worsened Tuesday.  Patient seem to be improving before getting worse.  No known sick contacts.  Patient now has body aches.  Originally 2 weeks ago symptoms started with some sore throat symptoms and congestion.  She has some runny nose symptoms.  She did not have chest congestion at that time.  Her asthma has been exacerbated by recent worsening symptoms.  No GI symptoms.  No difficulty breathing.  Notes increased use of inhaler from a couple times per week to 3-4 times per day.      Past Medical History:   Diagnosis Date    Allergy     Anemia 3/30/2020    Asthma     Fever blister     Hypertension        Past Surgical History:   Procedure Laterality Date    BILATERAL SALPINGOOPHORECTOMY Bilateral 5/18/2007    Torsion of ovaries and tubes    BREAST BIOPSY Right     (-)    CARDIAC CATHETERIZATION      cardiac stents  01/2017    1 stent    HYSTERECTOMY  1/13/2006    uterus and cervix due to AUB    OOPHORECTOMY      TRIGGER FINGER RELEASE Right 9/8/2020    Procedure: RELEASE, TRIGGER FINGER R-thumb;  Surgeon: Sam Bangura Jr., MD;  Location: [facility];  Service: Orthopedics;  Laterality: Right;       Family History   Problem Relation Name Age of Onset    Heart disease Mother      Diabetes Mother      Heart disease Father         Social History     Socioeconomic History    Marital status:      Spouse name: Danny    Number of children: 2    Years  of education: 12th   Occupational History    Occupation: homemaker   Tobacco Use    Smoking status: Never    Smokeless tobacco: Never   Substance and Sexual Activity    Alcohol use: No    Drug use: No    Sexual activity: Yes     Partners: Male     Birth control/protection: Surgical   Other Topics Concern    Are you pregnant or think you may be? No    Breast-feeding No       Current Outpatient Medications   Medication Sig Dispense Refill    albuterol (PROVENTIL/VENTOLIN HFA) 90 mcg/actuation inhaler Inhale 2 puffs into the lungs every 6 (six) hours as needed for Wheezing. 18 g 2    alirocumab (PRALUENT PEN) 150 mg/mL PnIj Inject 2 mLs (300 mg total) into the skin every 30 days. 2 mL 11    ascorbic acid, vitamin C, (VITAMIN C) 1000 MG tablet Take 1,000 mg by mouth once daily.      aspirin 81 MG Chew Take 1 tablet (81 mg total) by mouth once daily. 1 tablet 0    atorvastatin (LIPITOR) 80 MG tablet TAKE 1 TABLET BY MOUTH EVERY  EVENING 90 tablet 3    calcium-vitamin D (OSCAL) 250 (625)-125 mg-unit per tablet Take 1 tablet by mouth once daily.      carvediloL (COREG) 25 MG tablet TAKE 1 TABLET BY MOUTH TWICE DAILY WITH MEALS 180 tablet 0    fluticasone propionate (FLONASE) 50 mcg/actuation nasal spray 1 spray (50 mcg total) by Each Nostril route 2 (two) times daily. 16 g 2    fluticasone-salmeterol diskus inhaler 100-50 mcg INHALE 1 DOSE BY MOUTH INTO LUNGS TWICE DAILY .  CONTROLLER 60 each 5    lisinopriL (PRINIVIL,ZESTRIL) 40 MG tablet TAKE 1 TABLET BY MOUTH ONCE DAILY IN THE EVENING 90 tablet 3    meloxicam (MOBIC) 15 MG tablet Take 1 tablet (15 mg total) by mouth once daily. 30 tablet 5    oxybutynin (DITROPAN XL) 15 MG TR24 Take 1 tablet (15 mg total) by mouth once daily. 30 tablet 5    pantoprazole (PROTONIX) 40 MG tablet Take 1 tablet (40 mg total) by mouth once daily. 90 tablet 1    solifenacin (VESICARE) 10 MG tablet Take 10 mg by mouth.      spironolactone (ALDACTONE) 25 MG tablet Take 1 tablet by mouth once  "daily 90 tablet 0    [START ON 2/7/2025] cefdinir (OMNICEF) 300 MG capsule Take 1 capsule (300 mg total) by mouth 2 (two) times daily. for 10 days 20 capsule 0    ezetimibe (ZETIA) 10 mg tablet Take 1 tablet (10 mg total) by mouth once daily. 90 tablet 3    LIDOcaine viscous HCl 2% (LIDOCAINE VISCOUS) 2 % Soln by Mucous Membrane route every 3 (three) hours. Gargle 5 ml in the throat every 3 hours as needed for sore throat. 100 mL 0    oseltamivir (TAMIFLU) 75 MG capsule Take 1 capsule (75 mg total) by mouth 2 (two) times daily. for 5 days 10 capsule 0    [START ON 2/7/2025] predniSONE (DELTASONE) 10 MG tablet 4 by mouth once today, then 3 by mouth tomorrow, then 2 by mouth on day 3, then one by mouth on the last day. 10 tablet 0    promethazine-dextromethorphan (PROMETHAZINE-DM) 6.25-15 mg/5 mL Syrp Take 7.5 mLs by mouth every 4 (four) hours as needed (night time cough). 120 mL 0    valACYclovir (VALTREX) 1000 MG tablet 2 tablets PO at onset of fever blister. Repeat 12 hours later x1 dose. Repeat for repeat occurrence. (Patient not taking: Reported on 2/6/2025) 30 tablet 5     No current facility-administered medications for this visit.       Review of patient's allergies indicates:  No Known Allergies    Physical examination  Vitals Reviewed  BP (!) 98/58   Temp 98.3 °F (36.8 °C) (Oral)   Ht 5' 2" (1.575 m)   Wt 99.5 kg (219 lb 6.4 oz)   LMP  (LMP Unknown)   SpO2 96%   BMI 40.13 kg/m²  Body mass index is 40.13 kg/m².     BP Readings from Last 3 Encounters:   02/06/25 (!) 98/58   11/12/24 138/80   07/30/24 132/80       Wt Readings from Last 3 Encounters:   02/06/25 99.5 kg (219 lb 6.4 oz)   11/20/24 98 kg (216 lb 0.8 oz)   11/12/24 98 kg (216 lb)       Gen. Well-dressed well-nourished   Skin warm dry and intact.  No rashes noted.  HEENT.  TM intact bilateral with normal light reflex.  No mastoid tenderness during percussion.  Nares patent bilateral.  Pharynx is unremarkable.  No maxillary or frontal sinus " tenderness when percussed.    Neck is supple without adenopathy  Chest.  Respirations are even unlabored.  Lungs are clear to auscultation.  Cardiac regular rate and rhythm.  No chest wall adenopathy noted.  Neuro. Awake alert oriented x4.  Normal judgment and cognition noted.  Extremities no clubbing cyanosis or edema noted.     Call or return to clinic prn if these symptoms worsen or fail to improve as anticipated.

## 2025-02-25 DIAGNOSIS — K21.9 GASTROESOPHAGEAL REFLUX DISEASE WITHOUT ESOPHAGITIS: ICD-10-CM

## 2025-02-25 RX ORDER — PANTOPRAZOLE SODIUM 40 MG/1
40 TABLET, DELAYED RELEASE ORAL
Qty: 90 TABLET | Refills: 0 | Status: SHIPPED | OUTPATIENT
Start: 2025-02-25

## 2025-04-03 PROBLEM — R00.1 BRADYCARDIA: Status: RESOLVED | Noted: 2018-05-01 | Resolved: 2025-04-03

## 2025-05-07 ENCOUNTER — LAB VISIT (OUTPATIENT)
Dept: LAB | Facility: HOSPITAL | Age: 60
End: 2025-05-07
Attending: NURSE PRACTITIONER
Payer: MEDICAID

## 2025-05-07 DIAGNOSIS — N39.41 URGE INCONTINENCE OF URINE: ICD-10-CM

## 2025-05-07 DIAGNOSIS — R71.8 MICROCYTIC RED BLOOD CELLS: ICD-10-CM

## 2025-05-07 DIAGNOSIS — E78.2 MIXED HYPERLIPIDEMIA: ICD-10-CM

## 2025-05-07 DIAGNOSIS — D56.3 BETA THALASSEMIA MINOR: ICD-10-CM

## 2025-05-07 DIAGNOSIS — J45.21 MILD INTERMITTENT ASTHMA WITH ACUTE EXACERBATION: ICD-10-CM

## 2025-05-07 LAB
ABSOLUTE EOSINOPHIL (SMH): 0.16 K/UL
ABSOLUTE MONOCYTE (SMH): 0.36 K/UL (ref 0.3–1)
ABSOLUTE NEUTROPHIL COUNT (SMH): 4.2 K/UL (ref 1.8–7.7)
ALBUMIN SERPL-MCNC: 4.2 G/DL (ref 3.5–5.2)
ALP SERPL-CCNC: 84 UNIT/L (ref 55–135)
ALT SERPL-CCNC: 18 UNIT/L (ref 10–44)
ANION GAP (SMH): 6 MMOL/L (ref 8–16)
AST SERPL-CCNC: 17 UNIT/L (ref 10–40)
BACTERIA #/AREA URNS AUTO: NORMAL /HPF
BASOPHILS # BLD AUTO: 0.03 K/UL
BASOPHILS NFR BLD AUTO: 0.4 %
BILIRUB SERPL-MCNC: 0.5 MG/DL (ref 0.1–1)
BILIRUB UR QL STRIP.AUTO: NEGATIVE
BUN SERPL-MCNC: 25 MG/DL (ref 6–20)
CALCIUM SERPL-MCNC: 9.8 MG/DL (ref 8.7–10.5)
CHLORIDE SERPL-SCNC: 107 MMOL/L (ref 95–110)
CHOLEST SERPL-MCNC: 91 MG/DL (ref 120–199)
CHOLEST/HDLC SERPL: 1.4 {RATIO} (ref 2–5)
CLARITY UR: CLEAR
CO2 SERPL-SCNC: 28 MMOL/L (ref 23–29)
COLOR UR AUTO: YELLOW
CREAT SERPL-MCNC: 0.9 MG/DL (ref 0.5–1.4)
ERYTHROCYTE [DISTWIDTH] IN BLOOD BY AUTOMATED COUNT: 17.3 % (ref 11.5–14.5)
GFR SERPLBLD CREATININE-BSD FMLA CKD-EPI: >60 ML/MIN/1.73/M2
GLUCOSE SERPL-MCNC: 110 MG/DL (ref 70–110)
GLUCOSE UR QL STRIP: NEGATIVE
HCT VFR BLD AUTO: 39.1 % (ref 37–48.5)
HDLC SERPL-MCNC: 64 MG/DL (ref 40–75)
HDLC SERPL: 70.3 % (ref 20–50)
HGB BLD-MCNC: 11.7 GM/DL (ref 12–16)
HGB UR QL STRIP: NEGATIVE
IMM GRANULOCYTES # BLD AUTO: 0.01 K/UL (ref 0–0.04)
IMM GRANULOCYTES NFR BLD AUTO: 0.1 % (ref 0–0.5)
KETONES UR QL STRIP: NEGATIVE
LDLC SERPL CALC-MCNC: 11.4 MG/DL (ref 63–159)
LEUKOCYTE ESTERASE UR QL STRIP: ABNORMAL
LYMPHOCYTES # BLD AUTO: 1.94 K/UL (ref 1–4.8)
MCH RBC QN AUTO: 19.6 PG (ref 27–31)
MCHC RBC AUTO-ENTMCNC: 29.9 G/DL (ref 32–36)
MCV RBC AUTO: 66 FL (ref 82–98)
MICROSCOPIC COMMENT: NORMAL
NITRITE UR QL STRIP: NEGATIVE
NONHDLC SERPL-MCNC: 27 MG/DL
NUCLEATED RBC (/100WBC) (SMH): 0 /100 WBC
PH UR STRIP: 6 [PH]
PLATELET # BLD AUTO: 274 K/UL (ref 150–450)
PMV BLD AUTO: 10.7 FL (ref 9.2–12.9)
POTASSIUM SERPL-SCNC: 5.1 MMOL/L (ref 3.5–5.1)
PROT SERPL-MCNC: 6.8 GM/DL (ref 6–8.4)
PROT UR QL STRIP: NEGATIVE
RBC # BLD AUTO: 5.97 M/UL (ref 4–5.4)
RBC #/AREA URNS AUTO: 1 /HPF
RELATIVE EOSINOPHIL (SMH): 2.4 % (ref 0–8)
RELATIVE LYMPHOCYTE (SMH): 29.1 % (ref 18–48)
RELATIVE MONOCYTE (SMH): 5.4 % (ref 4–15)
RELATIVE NEUTROPHIL (SMH): 62.6 % (ref 38–73)
SODIUM SERPL-SCNC: 141 MMOL/L (ref 136–145)
SP GR UR STRIP: 1.02
SQUAMOUS #/AREA URNS AUTO: 5 /HPF
TRIGL SERPL-MCNC: 78 MG/DL (ref 30–150)
UROBILINOGEN UR STRIP-ACNC: NEGATIVE EU/DL
WBC # BLD AUTO: 6.67 K/UL (ref 3.9–12.7)
WBC #/AREA URNS AUTO: 4 /HPF

## 2025-05-07 PROCEDURE — 81003 URINALYSIS AUTO W/O SCOPE: CPT

## 2025-05-07 PROCEDURE — 82040 ASSAY OF SERUM ALBUMIN: CPT

## 2025-05-07 PROCEDURE — 85025 COMPLETE CBC W/AUTO DIFF WBC: CPT

## 2025-05-07 PROCEDURE — 36415 COLL VENOUS BLD VENIPUNCTURE: CPT

## 2025-05-07 PROCEDURE — 80061 LIPID PANEL: CPT

## 2025-05-13 ENCOUNTER — OFFICE VISIT (OUTPATIENT)
Dept: FAMILY MEDICINE | Facility: CLINIC | Age: 60
End: 2025-05-13
Payer: MEDICAID

## 2025-05-13 ENCOUNTER — PATIENT MESSAGE (OUTPATIENT)
Dept: FAMILY MEDICINE | Facility: CLINIC | Age: 60
End: 2025-05-13
Payer: MEDICAID

## 2025-05-13 VITALS
DIASTOLIC BLOOD PRESSURE: 76 MMHG | OXYGEN SATURATION: 98 % | HEIGHT: 62 IN | WEIGHT: 219.81 LBS | BODY MASS INDEX: 40.45 KG/M2 | HEART RATE: 67 BPM | SYSTOLIC BLOOD PRESSURE: 130 MMHG | TEMPERATURE: 98 F

## 2025-05-13 DIAGNOSIS — R71.8 MICROCYTIC RED BLOOD CELLS: ICD-10-CM

## 2025-05-13 DIAGNOSIS — E78.2 MIXED HYPERLIPIDEMIA: ICD-10-CM

## 2025-05-13 DIAGNOSIS — D56.3 BETA THALASSEMIA MINOR: ICD-10-CM

## 2025-05-13 DIAGNOSIS — R73.01 ELEVATED FASTING GLUCOSE: Primary | ICD-10-CM

## 2025-05-13 DIAGNOSIS — Z11.4 ENCOUNTER FOR SCREENING FOR HIV: ICD-10-CM

## 2025-05-13 DIAGNOSIS — B00.1 FEVER BLISTER: ICD-10-CM

## 2025-05-13 DIAGNOSIS — J45.21 MILD INTERMITTENT ASTHMA WITH ACUTE EXACERBATION: ICD-10-CM

## 2025-05-13 DIAGNOSIS — N39.41 URGE INCONTINENCE OF URINE: ICD-10-CM

## 2025-05-13 PROCEDURE — 99999 PR PBB SHADOW E&M-EST. PATIENT-LVL IV: CPT | Mod: PBBFAC,,, | Performed by: NURSE PRACTITIONER

## 2025-05-13 PROCEDURE — 99214 OFFICE O/P EST MOD 30 MIN: CPT | Mod: PBBFAC,PN | Performed by: NURSE PRACTITIONER

## 2025-05-13 RX ORDER — VALACYCLOVIR HYDROCHLORIDE 1 G/1
TABLET, FILM COATED ORAL
Qty: 30 TABLET | Refills: 5 | Status: SHIPPED | OUTPATIENT
Start: 2025-05-13

## 2025-05-13 NOTE — PROGRESS NOTES
Subjective:       Patient ID: Zoila Moreland is a 59 y.o. female.    Chief Complaint: Hyperlipidemia, Hypertension, and Follow-up    History of Present Illness    CHIEF COMPLAINT:  Patient presents today to discuss results of recent labs and to inquire about a potential inherited condition mentioned by her daughter.    HPI:  Patient's daughter has recently been diagnosed with beta thalassemia, prompting her to inquire about the possibility of having inherited this condition herself. She reports ongoing arm pain and intermittent pain on one side of her body, extending from her arm down to her foot. She has frequent urination with urine production and occasional slight pain when urinating, which is not a new symptom. She has chronic back pain but denies any new or worsening back pain. She feels generally achy, particularly in the mornings. Her fasting glucose has increased from 94 to 110, which is in the pre-diabetic range. All of her family members are diabetics, with most of them developing the condition before the age of 60.    She denies UTI symptoms such as frequent urination without producing urine, fever, or new flank pain. She denies having any critical health issues based on her recent labs results.    MEDICATIONS:  Patient is on Atorvastatin 80 mg daily and Praluent (injection) monthly for cholesterol management. She is also taking Valtrex. Ezetimibe (Zetia) 10 mg, previously prescribed for cholesterol management, has been discontinued as the patient was not filling or taking it.    MEDICAL HISTORY:  Patient has documented beta thalassemia minor since 2021 diagnosed by hematology.    FAMILY HISTORY:  Family history is significant for the patient's daughter with suspected beta thalassemia, diagnosed by a doctor. Other family members have a history of diabetes.    TEST RESULTS:  Recent labs revealed multiple abnormal results. Patient's fasting glucose increased to 110 mg/dL from a previous 94 mg/dL.  Her BUN was elevated at 25 mg/dL. Liver enzymes and kidney function were normal. The cholesterol panel showed HDL at 64 mg/dL (goal >50 mg/dL) and LDL at 11 mg/dL (goal <100 mg/dL), which was noted as extremely low. A recent urinalysis indicated 3+ leukocytes, 1 RBC, 4 white blood cells, and rare bacteria.      ROS:  General: -fever, -chills, +fatigue, -weight gain, -weight loss  Eyes: -vision changes, -redness, -discharge  ENT: -ear pain, -nasal congestion, -sore throat  Cardiovascular: -chest pain, -palpitations, -lower extremity edema  Respiratory: -cough, -shortness of breath  Gastrointestinal: -abdominal pain, -nausea, -vomiting, -diarrhea, -constipation, -blood in stool  Genitourinary: +dysuria, -hematuria, -frequency, +excessive urination, +painful urination  Musculoskeletal: -joint pain, -muscle pain, +back pain, +limb pain, +body aches  Skin: -rash, -lesion  Neurological: -headache, -dizziness, -numbness, -tingling  Psychiatric: -anxiety, -depression, -sleep difficulty         Past Medical History:   Diagnosis Date    Allergy     Anemia 3/30/2020    Asthma     Fever blister     Hypertension         Past Surgical History:   Procedure Laterality Date    BILATERAL SALPINGOOPHORECTOMY Bilateral 5/18/2007    Torsion of ovaries and tubes    BREAST BIOPSY Right     (-)    CARDIAC CATHETERIZATION      cardiac stents  01/2017    1 stent    HYSTERECTOMY  1/13/2006    uterus and cervix due to AUB    OOPHORECTOMY      TRIGGER FINGER RELEASE Right 9/8/2020    Procedure: RELEASE, TRIGGER FINGER R-thumb;  Surgeon: Sam Bangura Jr., MD;  Location: [facility];  Service: Orthopedics;  Laterality: Right;       Family History   Problem Relation Name Age of Onset    Heart disease Mother      Diabetes Mother      Heart disease Father         Social History     Socioeconomic History    Marital status:      Spouse name: Danny    Number of children: 2    Years of education: 12th   Occupational History    Occupation: homemaker  "  Tobacco Use    Smoking status: Never    Smokeless tobacco: Never   Substance and Sexual Activity    Alcohol use: No    Drug use: No    Sexual activity: Yes     Partners: Male     Birth control/protection: Surgical   Other Topics Concern    Are you pregnant or think you may be? No    Breast-feeding No     Social Drivers of Health     Financial Resource Strain: Low Risk  (3/27/2025)    Overall Financial Resource Strain (CARDIA)     Difficulty of Paying Living Expenses: Not very hard   Food Insecurity: No Food Insecurity (3/27/2025)    Hunger Vital Sign     Worried About Running Out of Food in the Last Year: Never true     Ran Out of Food in the Last Year: Never true   Transportation Needs: No Transportation Needs (3/27/2025)    PRAPARE - Transportation     Lack of Transportation (Medical): No     Lack of Transportation (Non-Medical): No   Physical Activity: Insufficiently Active (3/27/2025)    Exercise Vital Sign     Days of Exercise per Week: 2 days     Minutes of Exercise per Session: 10 min   Stress: No Stress Concern Present (3/27/2025)    [employer] [place] [employer] - Occupational Stress Questionnaire     Feeling of Stress : Not at all   Housing Stability: Low Risk  (3/27/2025)    Housing Stability Vital Sign     Unable to Pay for Housing in the Last Year: No     Number of Times Moved in the Last Year: 0     Homeless in the Last Year: No       Current Medications[1]    Review of patient's allergies indicates:  No Known Allergies  Objective:      Blood pressure 130/76, pulse 67, temperature 98.2 °F (36.8 °C), height 5' 2" (1.575 m), weight 99.7 kg (219 lb 12.8 oz), SpO2 98%. Body mass index is 40.2 kg/m².   Physical Exam    General: No acute distress. Well-developed. Well-nourished.  Eyes: EOMI. Sclerae anicteric.  HENT: Normocephalic. Atraumatic. Nares patent. Moist oral mucosa.  Cardiovascular: Regular rate. Regular rhythm. No murmurs. No rubs. No gallops. Normal S1, S2.  Respiratory: Normal " respiratory effort. Clear to auscultation bilaterally. No rales. No rhonchi. No wheezing.  Abdomen: Soft. Non-distended.   Musculoskeletal: No  obvious deformity.  Extremities: No lower extremity edema.  Neurological: Alert & oriented x3. No slurred speech. Normal gait.  Psychiatric: Normal mood. Normal affect. Good insight. Good judgment.  Skin: Warm. Dry. No rash.         Assessment:       Assessment & Plan    - Reviewed lab results with patient, noting no critical abnormalities.  - Beta thalassemia trait possible based on consistently abnormal blood counts and family history.  - LDL cholesterol extremely low at 11.  - Urine results showing 3+ leukocytes, 1 RBC, 4 WBCs, and rare bacteria, not indicative of UTI given lack of symptoms.    ## PREDIABETES:  - Noted the patient's fasting glucose has increased to 110 from 94, indicating pre-diabetes.  - Discussed this condition with the patient and emphasized the importance of staying hydrated.  - Ordered Hemoglobin A1c test as part of fasting lab work in 6 months at [facility] to monitor blood sugar over time.    ## HYPERLIPIDEMIA:  - Monitored the patient's lipid panel showing extremely low LDL cholesterol at 11 and cardio-protective HDL at 64.  - Discussed the importance of cholesterol for brain function, cautioning against excessively low levels.  - Decreased atorvastatin from 80 mg to 40 mg daily and discontinued ezetimibe 10 mg due to excessively low LDL cholesterol.  - Continued Praluent injections monthly.  - Ordered lipid panel as part of fasting lab work in 6 months at [facility] lab.    ## ANEMIA (BETA THALASSEMIA TRAIT):  - Observed the patient's RBC count is high with cells that are small and pale, not strictly iron deficiency.  - Explained beta thalassemia trait as a genetic condition affecting erythrocytes, potentially causing fatigue and pain if erythrocyte count gets too high.    ## ABNORMAL URINE FINDINGS:  - Noted urinalysis showed 3+ leukocytes, 1  erythrocyte, 4 leukocytes, and rare bacteria.  - Determined findings are not indicative of a significant infection.  - Discussed UTI symptoms with the patient and the lack of infection signs.    ## ABNORMAL BLOOD CHEMISTRY (ELEVATED BUN):  - Noted the patient's BUN level has increased to 25.  - Discussed the importance of staying hydrated to manage BUN levels and advised the patient to increase fluid intake.  - Ordered CMP as part of fasting lab work in 6 months at [facility].    ## FAMILY HISTORY:  - Discussed family history of diabetes and its implications with the patient.  - This family history was considered when ordering HbA1c testing due to the elevated fasting glucose.    ## ADDITIONAL HEALTH SCREENINGS AND EDUCATION:  - Educated on shingles virus, its recurrence potential, and the high efficacy (97.7%) of the Shingrix vaccine in preventing shingles.  - Added HIV screening to next lab order at [facility] in 6 months.    ## FOLLOW-UP:  - Follow up in 6 months (November).  - Complete fasting lab work before next appointment, including lipid panel, HbA1c, CMP, and HIV screening at [facility].       Plan:       Zoila was seen today for hyperlipidemia, hypertension and follow-up.    Diagnoses and all orders for this visit:    Elevated fasting glucose  -     Hemoglobin A1c; Future  -     Hemoglobin A1c    Fever blister  -     valACYclovir (VALTREX) 1000 MG tablet; 2 tablets PO at onset of fever blister. Repeat 12 hours later x1 dose. Repeat for repeat occurrence.    Mild intermittent asthma with acute exacerbation  -     Urinalysis; Future  -     Comprehensive Metabolic Panel; Future  -     Urinalysis  -     Comprehensive Metabolic Panel    Urge incontinence of urine  -     Urinalysis; Future  -     Urinalysis    Mixed hyperlipidemia  -     Lipid Panel; Future  -     Lipid Panel    Microcytic red blood cells  -     CBC Auto Differential; Future  -     CBC Auto Differential    Beta thalassemia minor  -      CBC Auto Differential; Future  -     CBC Auto Differential    Encounter for screening for HIV  -     HIV 1/2 Ag/Ab (4th Gen); Future  -     HIV 1/2 Ag/Ab (4th Gen)           This note was generated with the assistance of ambient listening technology. Verbal consent was obtained by the patient and accompanying visitor(s) for the recording of patient appointment to facilitate this note. I attest to having reviewed and edited the generated note for accuracy, though some syntax or spelling errors may persist. Please contact the author of this note for any clarification.    Visit today included increased complexity associated with the care of the episodic problem beta thalassemia minor and fatigue addressed and managing the longitudinal care of the patient due to the serious and/or complex managed problem(s) hypertension, hyperlipidemia,fever blisters, asthma.          [1]   Current Outpatient Medications   Medication Sig Dispense Refill    albuterol (PROVENTIL/VENTOLIN HFA) 90 mcg/actuation inhaler Inhale 2 puffs into the lungs every 6 (six) hours as needed for Wheezing. 18 g 2    alirocumab (PRALUENT PEN) 150 mg/mL PnIj Inject 2 mLs (300 mg total) into the skin every 30 days. 2 mL 11    ascorbic acid, vitamin C, (VITAMIN C) 1000 MG tablet Take 1,000 mg by mouth once daily.      aspirin 81 MG Chew Take 1 tablet (81 mg total) by mouth once daily. 1 tablet 0    atorvastatin (LIPITOR) 80 MG tablet Take 1 tablet (80 mg total) by mouth every evening. 90 tablet 3    calcium-vitamin D (OSCAL) 250 (625)-125 mg-unit per tablet Take 1 tablet by mouth once daily.      carvediloL (COREG) 25 MG tablet Take 1 tablet (25 mg total) by mouth 2 (two) times daily with meals. 180 tablet 3    fluticasone propionate (FLONASE) 50 mcg/actuation nasal spray 1 spray (50 mcg total) by Each Nostril route 2 (two) times daily. 16 g 2    fluticasone-salmeterol diskus inhaler 100-50 mcg INHALE 1 DOSE BY MOUTH INTO LUNGS TWICE DAILY .  CONTROLLER 60  each 5    lisinopriL (PRINIVIL,ZESTRIL) 40 MG tablet Take 1 tablet (40 mg total) by mouth every evening. 90 tablet 3    meloxicam (MOBIC) 15 MG tablet Take 1 tablet (15 mg total) by mouth once daily. 30 tablet 5    pantoprazole (PROTONIX) 40 MG tablet Take 1 tablet by mouth once daily 90 tablet 0    solifenacin (VESICARE) 10 MG tablet Take 10 mg by mouth.      spironolactone (ALDACTONE) 25 MG tablet Take 1 tablet (25 mg total) by mouth once daily. 90 tablet 3    LIDOcaine viscous HCl 2% (LIDOCAINE VISCOUS) 2 % Soln by Mucous Membrane route every 3 (three) hours. Gargle 5 ml in the throat every 3 hours as needed for sore throat. (Patient not taking: Reported on 5/13/2025) 100 mL 0    valACYclovir (VALTREX) 1000 MG tablet 2 tablets PO at onset of fever blister. Repeat 12 hours later x1 dose. Repeat for repeat occurrence. 30 tablet 5     No current facility-administered medications for this visit.

## 2025-05-19 DIAGNOSIS — K21.9 GASTROESOPHAGEAL REFLUX DISEASE WITHOUT ESOPHAGITIS: ICD-10-CM

## 2025-05-19 RX ORDER — PANTOPRAZOLE SODIUM 40 MG/1
40 TABLET, DELAYED RELEASE ORAL
Qty: 90 TABLET | Refills: 3 | Status: SHIPPED | OUTPATIENT
Start: 2025-05-19

## 2025-06-10 ENCOUNTER — TELEPHONE (OUTPATIENT)
Dept: FAMILY MEDICINE | Facility: CLINIC | Age: 60
End: 2025-06-10
Payer: MEDICAID

## 2025-06-10 DIAGNOSIS — M23.51 RECURRENT RIGHT KNEE INSTABILITY: Primary | ICD-10-CM

## 2025-06-10 DIAGNOSIS — G89.29 CHRONIC PAIN OF RIGHT KNEE: ICD-10-CM

## 2025-06-10 DIAGNOSIS — M25.561 CHRONIC PAIN OF RIGHT KNEE: ICD-10-CM

## 2025-06-10 NOTE — TELEPHONE ENCOUNTER
----- Message from Jerri sent at 6/10/2025  9:04 AM CDT -----  The patient needs another referral to see Dr. Ham. She is having pain in her RT knee. Please call when the referral is sent. Pt's # [phone] GH

## 2025-06-12 ENCOUNTER — HOSPITAL ENCOUNTER (OUTPATIENT)
Dept: RADIOLOGY | Facility: HOSPITAL | Age: 60
Discharge: HOME | End: 2025-06-12
Attending: ORTHOPAEDIC SURGERY
Payer: MEDICAID

## 2025-06-12 ENCOUNTER — OFFICE VISIT (OUTPATIENT)
Dept: ORTHOPEDICS | Facility: CLINIC | Age: 60
End: 2025-06-12
Payer: MEDICAID

## 2025-06-12 VITALS — HEIGHT: 62 IN | WEIGHT: 219.81 LBS | BODY MASS INDEX: 40.45 KG/M2

## 2025-06-12 DIAGNOSIS — Z12.31 OTHER SCREENING MAMMOGRAM: ICD-10-CM

## 2025-06-12 DIAGNOSIS — M23.203 DEGENERATIVE TEAR OF MEDIAL MENISCUS, RIGHT: ICD-10-CM

## 2025-06-12 DIAGNOSIS — M22.41 CHONDROMALACIA PATELLAE, RIGHT: Primary | ICD-10-CM

## 2025-06-12 PROCEDURE — 99999PBSHW PR PBB SHADOW TECHNICAL ONLY FILED TO HB: Mod: PBBFAC,,,

## 2025-06-12 PROCEDURE — 73562 X-RAY EXAM OF KNEE 3: CPT | Mod: TC,PN,RT

## 2025-06-12 PROCEDURE — 73562 X-RAY EXAM OF KNEE 3: CPT | Mod: 26,RT,, | Performed by: RADIOLOGY

## 2025-06-12 PROCEDURE — 20610 DRAIN/INJ JOINT/BURSA W/O US: CPT | Mod: PBBFAC,PN,RT | Performed by: ORTHOPAEDIC SURGERY

## 2025-06-12 PROCEDURE — 99999 PR PBB SHADOW E&M-EST. PATIENT-LVL IV: CPT | Mod: PBBFAC,,, | Performed by: ORTHOPAEDIC SURGERY

## 2025-06-12 PROCEDURE — 99214 OFFICE O/P EST MOD 30 MIN: CPT | Mod: PBBFAC,25,PN | Performed by: ORTHOPAEDIC SURGERY

## 2025-06-12 RX ORDER — TRIAMCINOLONE ACETONIDE 40 MG/ML
40 INJECTION, SUSPENSION INTRA-ARTICULAR; INTRAMUSCULAR
Status: DISCONTINUED | OUTPATIENT
Start: 2025-06-12 | End: 2025-06-12 | Stop reason: HOSPADM

## 2025-06-12 RX ADMIN — TRIAMCINOLONE ACETONIDE 40 MG: 40 INJECTION, SUSPENSION INTRA-ARTICULAR; INTRAMUSCULAR at 09:06

## 2025-06-12 NOTE — PROCEDURES
Large Joint Aspiration/Injection: R knee    Date/Time: 6/12/2025 9:15 AM    Performed by: Albert Ham MD  Authorized by: Albert Ham MD    Consent Done?:  Yes (Verbal)  Indications:  Pain  Site marked: the procedure site was marked    Timeout: prior to procedure the correct patient, procedure, and site was verified    Prep: patient was prepped and draped in usual sterile fashion      Local anesthesia used?: Yes    Local anesthetic:  Lidocaine 1% without epinephrine    Details:  Needle Size:  25 G  Ultrasonic Guidance for needle placement?: No    Location:  Knee  Site:  R knee  Medications:  40 mg triamcinolone acetonide 40 mg/mL  Patient tolerance:  Patient tolerated the procedure well with no immediate complications

## 2025-06-12 NOTE — PROGRESS NOTES
[facility]    Subjective:     Chief Complaint:   Chief Complaint   Patient presents with    Right Knee - Pain     Right knee pain, states pain has gotten worse since last visit. Has shooting pain as well as constant sharp stabbing pain. Painful to bend back        Past Medical History:   Diagnosis Date    Allergy     Anemia 3/30/2020    Asthma     Fever blister     Hypertension        Past Surgical History:   Procedure Laterality Date    BILATERAL SALPINGOOPHORECTOMY Bilateral 5/18/2007    Torsion of ovaries and tubes    BREAST BIOPSY Right     (-)    CARDIAC CATHETERIZATION      cardiac stents  01/2017    1 stent    HYSTERECTOMY  1/13/2006    uterus and cervix due to AUB    OOPHORECTOMY      TRIGGER FINGER RELEASE Right 9/8/2020    Procedure: RELEASE, TRIGGER FINGER R-thumb;  Surgeon: Sam Bangura Jr., MD;  Location: [facility]ending sale to [facility];  Service: Orthopedics;  Laterality: Right;       Current Outpatient Medications   Medication Sig    albuterol (PROVENTIL/VENTOLIN HFA) 90 mcg/actuation inhaler Inhale 2 puffs into the lungs every 6 (six) hours as needed for Wheezing.    alirocumab (PRALUENT PEN) 150 mg/mL PnIj Inject 2 mLs (300 mg total) into the skin every 30 days.    ascorbic acid, vitamin C, (VITAMIN C) 1000 MG tablet Take 1,000 mg by mouth once daily.    aspirin 81 MG Chew Take 1 tablet (81 mg total) by mouth once daily.    atorvastatin (LIPITOR) 80 MG tablet Take 1 tablet (80 mg total) by mouth every evening.    calcium-vitamin D (OSCAL) 250 (625)-125 mg-unit per tablet Take 1 tablet by mouth once daily.    carvediloL (COREG) 25 MG tablet Take 1 tablet (25 mg total) by mouth 2 (two) times daily with meals.    fluticasone propionate (FLONASE) 50 mcg/actuation nasal spray 1 spray (50 mcg total) by Each Nostril route 2 (two) times daily.    fluticasone-salmeterol diskus inhaler 100-50 mcg INHALE 1 DOSE BY MOUTH INTO LUNGS TWICE DAILY .  CONTROLLER    lisinopriL (PRINIVIL,ZESTRIL) 40 MG tablet Take 1 tablet (40  mg total) by mouth every evening.    meloxicam (MOBIC) 15 MG tablet Take 1 tablet (15 mg total) by mouth once daily.    pantoprazole (PROTONIX) 40 MG tablet Take 1 tablet by mouth once daily    solifenacin (VESICARE) 10 MG tablet Take 10 mg by mouth.    spironolactone (ALDACTONE) 25 MG tablet Take 1 tablet (25 mg total) by mouth once daily.    valACYclovir (VALTREX) 1000 MG tablet 2 tablets PO at onset of fever blister. Repeat 12 hours later x1 dose. Repeat for repeat occurrence.    LIDOcaine viscous HCl 2% (LIDOCAINE VISCOUS) 2 % Soln by Mucous Membrane route every 3 (three) hours. Gargle 5 ml in the throat every 3 hours as needed for sore throat. (Patient not taking: Reported on 5/13/2025)     No current facility-administered medications for this visit.       Review of patient's allergies indicates:  No Known Allergies    Family History   Problem Relation Name Age of Onset    Heart disease Mother      Diabetes Mother      Heart disease Father         Social History[1]    History of present illness:  Patient comes in today for the right knee.  Unfortunately her knee pain has returned.  She is miserable.  She is actually having difficulty ambulating.  She does feel unstable at times.  She is actually having difficulty sleeping.      Review of Systems:    Constitution: Negative for chills, fever, and sweats.  Negative for unexplained weight loss.    HENT:  Negative for headaches and blurry vision.    Cardiovascular:Negative for chest pain or irregular heart beat. Negative for hypertension.    Respiratory:  Negative for cough and shortness of breath.    Gastrointestinal: Negative for abdominal pain, heartburn, melena, nausea, and vomitting.    Genitourinary:  Negative bladder incontinence and dysuria.    Musculoskeletal:  See HPI for details.     Neurological: Negative for numbness.    Psychiatric/Behavioral: Negative for depression.  The patient is not nervous/anxious.      Endocrine: Negative for  polyuria    Hematologic/Lymphatic: Negative for bleeding problem.  Does not bruise/bleed easily.    Skin: Negative for poor would healing and rash    Objective:      Physical Examination:    Vital Signs:  There were no vitals filed for this visit.    Body mass index is 40.2 kg/m².    This a well-developed, well nourished patient in no acute distress.  They are alert and oriented and cooperative to examination.        Patient appears to be stated age.  She range of motion 0-120 degrees.  Her knee is stable to varus valgus stresses she does have 1+ effusion lot of medial joint line tenderness positive Graham's positive anterior crepitus  Pertinent New Results:    XRAY Report / Interpretation:   All reviewed    Assessment/Plan:      Medial meniscal tear degenerative.  Chondromalacia patella.  I injected her again today with steroid and local numbing going to get an MRI to better look at the medial meniscus.  I will see her back with that information      This note was created using Dragon voice recognition software that occasionally misinterpreted phrases or words.               [1]   Social History  Socioeconomic History    Marital status:      Spouse name: Danny    Number of children: 2    Years of education: 12th   Occupational History    Occupation: homemaker   Tobacco Use    Smoking status: Never    Smokeless tobacco: Never   Substance and Sexual Activity    Alcohol use: No    Drug use: No    Sexual activity: Yes     Partners: Male     Birth control/protection: Surgical   Other Topics Concern    Are you pregnant or think you may be? No    Breast-feeding No     Social Drivers of Health     Financial Resource Strain: Low Risk  (3/27/2025)    Overall Financial Resource Strain (CARDIA)     Difficulty of Paying Living Expenses: Not very hard   Food Insecurity: No Food Insecurity (3/27/2025)    Hunger Vital Sign     Worried About Running Out of Food in the Last Year: Never true     Ran Out of Food in the Last  Year: Never true   Transportation Needs: No Transportation Needs (3/27/2025)    PRAPARE - Transportation     Lack of Transportation (Medical): No     Lack of Transportation (Non-Medical): No   Physical Activity: Insufficiently Active (3/27/2025)    Exercise Vital Sign     Days of Exercise per Week: 2 days     Minutes of Exercise per Session: 10 min   Stress: No Stress Concern Present (3/27/2025)    [employer] [place] - Occupational Stress Questionnaire     Feeling of Stress : Not at all   Housing Stability: Low Risk  (3/27/2025)    Housing Stability Vital Sign     Unable to Pay for Housing in the Last Year: No     Number of Times Moved in the Last Year: 0     Homeless in the Last Year: No

## 2025-06-20 ENCOUNTER — HOSPITAL ENCOUNTER (OUTPATIENT)
Dept: RADIOLOGY | Facility: HOSPITAL | Age: 60
Discharge: HOME | End: 2025-06-20
Attending: ORTHOPAEDIC SURGERY
Payer: MEDICAID

## 2025-06-20 ENCOUNTER — TELEPHONE (OUTPATIENT)
Dept: ORTHOPEDICS | Facility: CLINIC | Age: 60
End: 2025-06-20
Payer: MEDICAID

## 2025-06-20 DIAGNOSIS — M23.203 DEGENERATIVE TEAR OF MEDIAL MENISCUS, RIGHT: ICD-10-CM

## 2025-06-20 DIAGNOSIS — M23.203 DEGENERATIVE TEAR OF MEDIAL MENISCUS, RIGHT: Primary | ICD-10-CM

## 2025-06-20 RX ORDER — DIAZEPAM 5 MG/1
TABLET ORAL
Qty: 3 TABLET | Refills: 0 | Status: SHIPPED | OUTPATIENT
Start: 2025-06-20

## 2025-06-20 NOTE — TELEPHONE ENCOUNTER
----- Message from Med Assistant Santos sent at 6/20/2025  8:53 AM CDT -----  Regarding: RX and MRI Auth Update.  Unable to complete MRI due to anxiety can she get medication sent to [facility],   And an order for Open MRI at  [facility] for the larger barrel MRI.     I think I can update the MRI.

## 2025-06-23 ENCOUNTER — TELEPHONE (OUTPATIENT)
Dept: ORTHOPEDICS | Facility: CLINIC | Age: 60
End: 2025-06-23
Payer: MEDICAID

## 2025-06-23 DIAGNOSIS — M23.203 DEGENERATIVE TEAR OF MEDIAL MENISCUS, RIGHT: ICD-10-CM

## 2025-06-23 RX ORDER — DIAZEPAM 5 MG/1
TABLET ORAL
Qty: 3 TABLET | Refills: 0 | Status: SHIPPED | OUTPATIENT
Start: 2025-06-23

## 2025-06-23 NOTE — TELEPHONE ENCOUNTER
----- Message from Med Assistant Danielle sent at 6/23/2025 10:00 AM CDT -----  Regarding: MRI Anxiety  Patient called and states she was unable to do MRI due to anxiety, Would like something called in to try and repeat it, [facility] [address], [place]

## 2025-06-24 ENCOUNTER — HOSPITAL ENCOUNTER (OUTPATIENT)
Dept: RADIOLOGY | Facility: HOSPITAL | Age: 60
Discharge: HOME | End: 2025-06-24
Attending: ORTHOPAEDIC SURGERY
Payer: MEDICAID

## 2025-06-24 PROCEDURE — 73721 MRI JNT OF LWR EXTRE W/O DYE: CPT | Mod: 26,RT,, | Performed by: RADIOLOGY

## 2025-06-24 PROCEDURE — 73721 MRI JNT OF LWR EXTRE W/O DYE: CPT | Mod: TC,PO,RT

## 2025-06-26 ENCOUNTER — OFFICE VISIT (OUTPATIENT)
Dept: ORTHOPEDICS | Facility: CLINIC | Age: 60
End: 2025-06-26
Payer: MEDICAID

## 2025-06-26 VITALS — BODY MASS INDEX: 40.48 KG/M2 | WEIGHT: 220 LBS | HEIGHT: 62 IN

## 2025-06-26 DIAGNOSIS — M23.41 CHONDRAL LOOSE BODY OF RIGHT KNEE JOINT: ICD-10-CM

## 2025-06-26 DIAGNOSIS — M23.203 DEGENERATIVE TEAR OF MEDIAL MENISCUS, RIGHT: Primary | ICD-10-CM

## 2025-06-26 DIAGNOSIS — Z01.818 PRE-OP TESTING: ICD-10-CM

## 2025-06-26 DIAGNOSIS — M22.41 CHONDROMALACIA PATELLAE, RIGHT: ICD-10-CM

## 2025-06-26 PROCEDURE — 99999 PR PBB SHADOW E&M-EST. PATIENT-LVL IV: CPT | Mod: PBBFAC,,, | Performed by: ORTHOPAEDIC SURGERY

## 2025-06-26 PROCEDURE — 99214 OFFICE O/P EST MOD 30 MIN: CPT | Mod: PBBFAC,PN | Performed by: ORTHOPAEDIC SURGERY

## 2025-06-26 RX ORDER — CEFAZOLIN SODIUM 2 G/50ML
2 SOLUTION INTRAVENOUS
OUTPATIENT
Start: 2025-06-26

## 2025-06-26 NOTE — PROGRESS NOTES
[facility]    Subjective:     Chief Complaint:   Chief Complaint   Patient presents with    Right Knee - Pain     MRI Right knee results, the pain is same, the last injection did not help at all, pain is pretty constant, c/o some swelling, gives way at times       Past Medical History:   Diagnosis Date    Allergy     Anemia 3/30/2020    Asthma     Fever blister     Hypertension        Past Surgical History:   Procedure Laterality Date    BILATERAL SALPINGOOPHORECTOMY Bilateral 5/18/2007    Torsion of ovaries and tubes    BREAST BIOPSY Right     (-)    CARDIAC CATHETERIZATION      cardiac stents  01/2017    1 stent    HYSTERECTOMY  1/13/2006    uterus and cervix due to AUB    OOPHORECTOMY      TRIGGER FINGER RELEASE Right 9/8/2020    Procedure: RELEASE, TRIGGER FINGER R-thumb;  Surgeon: Sam Banguar Jr., MD;  Location: [facility];  Service: Orthopedics;  Laterality: Right;       Current Outpatient Medications   Medication Sig    albuterol (PROVENTIL/VENTOLIN HFA) 90 mcg/actuation inhaler Inhale 2 puffs into the lungs every 6 (six) hours as needed for Wheezing.    alirocumab (PRALUENT PEN) 150 mg/mL PnIj Inject 2 mLs (300 mg total) into the skin every 30 days.    ascorbic acid, vitamin C, (VITAMIN C) 1000 MG tablet Take 1,000 mg by mouth once daily.    aspirin 81 MG Chew Take 1 tablet (81 mg total) by mouth once daily.    atorvastatin (LIPITOR) 80 MG tablet Take 1 tablet (80 mg total) by mouth every evening.    calcium-vitamin D (OSCAL) 250 (625)-125 mg-unit per tablet Take 1 tablet by mouth once daily.    carvediloL (COREG) 25 MG tablet Take 1 tablet (25 mg total) by mouth 2 (two) times daily with meals.    diazePAM (VALIUM) 5 MG tablet Take 1 tablet 1 hour before scheduled test. Take 1 tablet when you arrive at the imaging center. If you are still anxious before the start of the test, take 1 tablet.    fluticasone propionate (FLONASE) 50 mcg/actuation nasal spray 1 spray (50 mcg total) by Each Nostril  route 2 (two) times daily.    fluticasone-salmeterol diskus inhaler 100-50 mcg INHALE 1 DOSE BY MOUTH INTO LUNGS TWICE DAILY .  CONTROLLER    lisinopriL (PRINIVIL,ZESTRIL) 40 MG tablet Take 1 tablet (40 mg total) by mouth every evening.    meloxicam (MOBIC) 15 MG tablet Take 1 tablet (15 mg total) by mouth once daily.    pantoprazole (PROTONIX) 40 MG tablet Take 1 tablet by mouth once daily    solifenacin (VESICARE) 10 MG tablet Take 10 mg by mouth.    spironolactone (ALDACTONE) 25 MG tablet Take 1 tablet (25 mg total) by mouth once daily.    valACYclovir (VALTREX) 1000 MG tablet 2 tablets PO at onset of fever blister. Repeat 12 hours later x1 dose. Repeat for repeat occurrence.    LIDOcaine viscous HCl 2% (LIDOCAINE VISCOUS) 2 % Soln by Mucous Membrane route every 3 (three) hours. Gargle 5 ml in the throat every 3 hours as needed for sore throat. (Patient not taking: Reported on 5/13/2025)     No current facility-administered medications for this visit.       Review of patient's allergies indicates:  No Known Allergies    Family History   Problem Relation Name Age of Onset    Heart disease Mother      Diabetes Mother      Heart disease Father         Social History[1]    History of present illness:  59-year-old female, presents to clinic today to follow up on MRI of the right knee.  She still has chronic persistent right knee pain and occasional instability or wanting to give way.      Review of Systems:    Constitution: Negative for chills, fever, and sweats.  Negative for unexplained weight loss.    HENT:  Negative for headaches and blurry vision.    Cardiovascular:Negative for chest pain or irregular heart beat. Negative for hypertension.    Respiratory:  Negative for cough and shortness of breath.    Gastrointestinal: Negative for abdominal pain, heartburn, melena, nausea, and vomitting.    Genitourinary:  Negative bladder incontinence and dysuria.    Musculoskeletal:  See HPI for details.     Neurological:  Negative for numbness.    Psychiatric/Behavioral: Negative for depression.  The patient is not nervous/anxious.      Endocrine: Negative for polyuria    Hematologic/Lymphatic: Negative for bleeding problem.  Does not bruise/bleed easily.    Skin: Negative for poor would healing and rash    Objective:      Physical Examination:    Vital Signs:  There were no vitals filed for this visit.    Body mass index is 40.24 kg/m².    This a well-developed, well nourished patient in no acute distress.  They are alert and oriented and cooperative to examination.          Examination of the right knee, skin is dry and intact, no erythema or ecchymosis, no signs symptoms of infection.  No effusion.  Range motion 0-120 degrees.  Stable to anterior-posterior varus and valgus stress.  Homans signs negative raise is negative.  Distally neurovascularly intact.  Mild discomfort over the medial joint line, positive Graham's.  Pertinent New Results:  Narrative & Impression  EXAMINATION:  MRI KNEE WITHOUT CONTRAST RIGHT     CLINICAL HISTORY:  Derangement of unspecified medial meniscus due to old tear or injury, right kneemedial meniscal tear;     COMPARISON:  Radiographs, June 2025     FINDINGS:  Multiplanar noncontrast imaging was performed.     The anterior and posterior cruciate ligaments are intact.  The medial collateral ligament and lateral collateral complex are normal.  The quadriceps and patellar tendons are unremarkable.     The lateral meniscus is within normal limits.  There is globular and linear degenerative signal change in the posterior horn and body of the medial meniscus.  Coronal images demonstrate a small tear of the meniscal body at the free edge.     Full-thickness chondral loss is noted at the medial compartment, with mild chondral thinning at the lateral compartment.  Subchondral degenerative signal changes involve the medial femoral condyle.  3 mm loose body is noted along the undersurface of the medial  collateral ligament just superior to the body of the medial meniscus (series 4, image 17).     Full-thickness chondral loss is noted at the medial patellar facet near the patellar apex, with small chondral fissure just lateral to the patellar apex.  The patellar retinaculum is normal.  There is a small suprapatellar joint effusion.     Impression:     1. Medial meniscal tear as above.  2. 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  3. 3 mm loose body along the undersurface of the MCL just superior to the body of the medial meniscus.  4. Small suprapatellar joint effusion.        Electronically signed by:Ever Fernandez  Date:                                            06/24/2025  Time:                                           08:51        Exam Ended: 06/24/25 07:50 CDT Last Resulted: 06/24/25 08:51 CDT     XRAY Report / Interpretation:       Assessment/Plan:        Chronic right knee pain, medial meniscal tear, she does have some patellofemoral arthrosis, there is a 3 mm loose body noted on MRI around the medial meniscus.  I have offered her arthroscopy for meniscal debridement, chondroplasty removal of the loose body.  Risks and benefits were all discussed with the patient in great detail, she wishes to proceed.  She has failed conservative treatment with injections.      Patient was consented for surgery. Risks and benefits of the procedure were discussed in great detail to include the expected preoperative, intraoperative and postoperative courses. Complications may include but are not limited to bleeding, infection, damage to nerves, arteries, blood vessels, bones, tendons, ligaments, stiffness, instability, deep vein thrombus (DVT), pulmonary embolus (PE), altered sensation, continued pain, RSD, loss of motion or a need for additional surgery. As well as general anesthetic complications including seizure, stroke, heart attack and even death.      Al Campos,  "Physician Assistant, served in the capacity as a "scribe" for this patient encounter.  A "face-to-face" encounter occurred with Dr. Albert Ham on this date.  The treatment plan and medical decision-making is outlined above. Patient was seen and examined with a chaperone.       This note was created using Dragon voice recognition software that occasionally misinterpreted phrases or words.             [1]   Social History  Socioeconomic History    Marital status:      Spouse name: Danny    Number of children: 2    Years of education: 12th   Occupational History    Occupation: homemaker   Tobacco Use    Smoking status: Never    Smokeless tobacco: Never   Substance and Sexual Activity    Alcohol use: No    Drug use: No    Sexual activity: Yes     Partners: Male     Birth control/protection: Surgical   Other Topics Concern    Are you pregnant or think you may be? No    Breast-feeding No     Social Drivers of Health     Financial Resource Strain: Low Risk  (3/27/2025)    Overall Financial Resource Strain (CARDIA)     Difficulty of Paying Living Expenses: Not very hard   Food Insecurity: No Food Insecurity (3/27/2025)    Hunger Vital Sign     Worried About Running Out of Food in the Last Year: Never true     Ran Out of Food in the Last Year: Never true   Transportation Needs: No Transportation Needs (3/27/2025)    PRAPARE - Transportation     Lack of Transportation (Medical): No     Lack of Transportation (Non-Medical): No   Physical Activity: Insufficiently Active (3/27/2025)    Exercise Vital Sign     Days of Exercise per Week: 2 days     Minutes of Exercise per Session: 10 min   Stress: No Stress Concern Present (3/27/2025)    [employer] - Occupational Stress Questionnaire     Feeling of Stress : Not at all   Housing Stability: Low Risk  (3/27/2025)    Housing Stability Vital Sign     Unable to Pay for Housing in the Last Year: No     Number of Times Moved in the Last Year: 0     " Homeless in the Last Year: No

## 2025-06-26 NOTE — H&P (VIEW-ONLY)
[facility]    Subjective:     Chief Complaint:   Chief Complaint   Patient presents with    Right Knee - Pain     MRI Right knee results, the pain is same, the last injection did not help at all, pain is pretty constant, c/o some swelling, gives way at times       Past Medical History:   Diagnosis Date    Allergy     Anemia 3/30/2020    Asthma     Fever blister     Hypertension        Past Surgical History:   Procedure Laterality Date    BILATERAL SALPINGOOPHORECTOMY Bilateral 5/18/2007    Torsion of ovaries and tubes    BREAST BIOPSY Right     (-)    CARDIAC CATHETERIZATION      cardiac stents  01/2017    1 stent    HYSTERECTOMY  1/13/2006    uterus and cervix due to AUB    OOPHORECTOMY      TRIGGER FINGER RELEASE Right 9/8/2020    Procedure: RELEASE, TRIGGER FINGER R-thumb;  Surgeon: Sam Bangura Jr., MD;  Location: [facility];  Service: Orthopedics;  Laterality: Right;       Current Outpatient Medications   Medication Sig    albuterol (PROVENTIL/VENTOLIN HFA) 90 mcg/actuation inhaler Inhale 2 puffs into the lungs every 6 (six) hours as needed for Wheezing.    alirocumab (PRALUENT PEN) 150 mg/mL PnIj Inject 2 mLs (300 mg total) into the skin every 30 days.    ascorbic acid, vitamin C, (VITAMIN C) 1000 MG tablet Take 1,000 mg by mouth once daily.    aspirin 81 MG Chew Take 1 tablet (81 mg total) by mouth once daily.    atorvastatin (LIPITOR) 80 MG tablet Take 1 tablet (80 mg total) by mouth every evening.    calcium-vitamin D (OSCAL) 250 (625)-125 mg-unit per tablet Take 1 tablet by mouth once daily.    carvediloL (COREG) 25 MG tablet Take 1 tablet (25 mg total) by mouth 2 (two) times daily with meals.    diazePAM (VALIUM) 5 MG tablet Take 1 tablet 1 hour before scheduled test. Take 1 tablet when you arrive at the imaging center. If you are still anxious before the start of the test, take 1 tablet.    fluticasone propionate (FLONASE) 50 mcg/actuation nasal spray 1 spray (50 mcg total) by Each Nostril  route 2 (two) times daily.    fluticasone-salmeterol diskus inhaler 100-50 mcg INHALE 1 DOSE BY MOUTH INTO LUNGS TWICE DAILY .  CONTROLLER    lisinopriL (PRINIVIL,ZESTRIL) 40 MG tablet Take 1 tablet (40 mg total) by mouth every evening.    meloxicam (MOBIC) 15 MG tablet Take 1 tablet (15 mg total) by mouth once daily.    pantoprazole (PROTONIX) 40 MG tablet Take 1 tablet by mouth once daily    solifenacin (VESICARE) 10 MG tablet Take 10 mg by mouth.    spironolactone (ALDACTONE) 25 MG tablet Take 1 tablet (25 mg total) by mouth once daily.    valACYclovir (VALTREX) 1000 MG tablet 2 tablets PO at onset of fever blister. Repeat 12 hours later x1 dose. Repeat for repeat occurrence.    LIDOcaine viscous HCl 2% (LIDOCAINE VISCOUS) 2 % Soln by Mucous Membrane route every 3 (three) hours. Gargle 5 ml in the throat every 3 hours as needed for sore throat. (Patient not taking: Reported on 5/13/2025)     No current facility-administered medications for this visit.       Review of patient's allergies indicates:  No Known Allergies    Family History   Problem Relation Name Age of Onset    Heart disease Mother      Diabetes Mother      Heart disease Father         Social History[1]    History of present illness:  59-year-old female, presents to clinic today to follow up on MRI of the right knee.  She still has chronic persistent right knee pain and occasional instability or wanting to give way.      Review of Systems:    Constitution: Negative for chills, fever, and sweats.  Negative for unexplained weight loss.    HENT:  Negative for headaches and blurry vision.    Cardiovascular:Negative for chest pain or irregular heart beat. Negative for hypertension.    Respiratory:  Negative for cough and shortness of breath.    Gastrointestinal: Negative for abdominal pain, heartburn, melena, nausea, and vomitting.    Genitourinary:  Negative bladder incontinence and dysuria.    Musculoskeletal:  See HPI for details.     Neurological:  Negative for numbness.    Psychiatric/Behavioral: Negative for depression.  The patient is not nervous/anxious.      Endocrine: Negative for polyuria    Hematologic/Lymphatic: Negative for bleeding problem.  Does not bruise/bleed easily.    Skin: Negative for poor would healing and rash    Objective:      Physical Examination:    Vital Signs:  There were no vitals filed for this visit.    Body mass index is 40.24 kg/m².    This a well-developed, well nourished patient in no acute distress.  They are alert and oriented and cooperative to examination.          Examination of the right knee, skin is dry and intact, no erythema or ecchymosis, no signs symptoms of infection.  No effusion.  Range motion 0-120 degrees.  Stable to anterior-posterior varus and valgus stress.  Homans signs negative raise is negative.  Distally neurovascularly intact.  Mild discomfort over the medial joint line, positive Graham's.  Pertinent New Results:  Narrative & Impression  EXAMINATION:  MRI KNEE WITHOUT CONTRAST RIGHT     CLINICAL HISTORY:  Derangement of unspecified medial meniscus due to old tear or injury, right kneemedial meniscal tear;     COMPARISON:  Radiographs, June 2025     FINDINGS:  Multiplanar noncontrast imaging was performed.     The anterior and posterior cruciate ligaments are intact.  The medial collateral ligament and lateral collateral complex are normal.  The quadriceps and patellar tendons are unremarkable.     The lateral meniscus is within normal limits.  There is globular and linear degenerative signal change in the posterior horn and body of the medial meniscus.  Coronal images demonstrate a small tear of the meniscal body at the free edge.     Full-thickness chondral loss is noted at the medial compartment, with mild chondral thinning at the lateral compartment.  Subchondral degenerative signal changes involve the medial femoral condyle.  3 mm loose body is noted along the undersurface of the medial  collateral ligament just superior to the body of the medial meniscus (series 4, image 17).     Full-thickness chondral loss is noted at the medial patellar facet near the patellar apex, with small chondral fissure just lateral to the patellar apex.  The patellar retinaculum is normal.  There is a small suprapatellar joint effusion.     Impression:     1. Medial meniscal tear as above.  2. 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  3. 3 mm loose body along the undersurface of the MCL just superior to the body of the medial meniscus.  4. Small suprapatellar joint effusion.        Electronically signed by:Ever Fernandez  Date:                                            06/24/2025  Time:                                           08:51        Exam Ended: 06/24/25 07:50 CDT Last Resulted: 06/24/25 08:51 CDT     XRAY Report / Interpretation:       Assessment/Plan:        Chronic right knee pain, medial meniscal tear, she does have some patellofemoral arthrosis, there is a 3 mm loose body noted on MRI around the medial meniscus.  I have offered her arthroscopy for meniscal debridement, chondroplasty removal of the loose body.  Risks and benefits were all discussed with the patient in great detail, she wishes to proceed.  She has failed conservative treatment with injections.      Patient was consented for surgery. Risks and benefits of the procedure were discussed in great detail to include the expected preoperative, intraoperative and postoperative courses. Complications may include but are not limited to bleeding, infection, damage to nerves, arteries, blood vessels, bones, tendons, ligaments, stiffness, instability, deep vein thrombus (DVT), pulmonary embolus (PE), altered sensation, continued pain, RSD, loss of motion or a need for additional surgery. As well as general anesthetic complications including seizure, stroke, heart attack and even death.      Al Campos,  "Physician Assistant, served in the capacity as a "scribe" for this patient encounter.  A "face-to-face" encounter occurred with Dr. Albert Ham on this date.  The treatment plan and medical decision-making is outlined above. Patient was seen and examined with a chaperone.       This note was created using Dragon voice recognition software that occasionally misinterpreted phrases or words.             [1]   Social History  Socioeconomic History    Marital status:      Spouse name: Danny    Number of children: 2    Years of education: 12th   Occupational History    Occupation: homemaker   Tobacco Use    Smoking status: Never    Smokeless tobacco: Never   Substance and Sexual Activity    Alcohol use: No    Drug use: No    Sexual activity: Yes     Partners: Male     Birth control/protection: Surgical   Other Topics Concern    Are you pregnant or think you may be? No    Breast-feeding No     Social Drivers of Health     Financial Resource Strain: Low Risk  (3/27/2025)    Overall Financial Resource Strain (CARDIA)     Difficulty of Paying Living Expenses: Not very hard   Food Insecurity: No Food Insecurity (3/27/2025)    Hunger Vital Sign     Worried About Running Out of Food in the Last Year: Never true     Ran Out of Food in the Last Year: Never true   Transportation Needs: No Transportation Needs (3/27/2025)    PRAPARE - Transportation     Lack of Transportation (Medical): No     Lack of Transportation (Non-Medical): No   Physical Activity: Insufficiently Active (3/27/2025)    Exercise Vital Sign     Days of Exercise per Week: 2 days     Minutes of Exercise per Session: 10 min   Stress: No Stress Concern Present (3/27/2025)    [employer] - Occupational Stress Questionnaire     Feeling of Stress : Not at all   Housing Stability: Low Risk  (3/27/2025)    Housing Stability Vital Sign     Unable to Pay for Housing in the Last Year: No     Number of Times Moved in the Last Year: 0     " Homeless in the Last Year: No

## 2025-07-07 RX ORDER — SOLIFENACIN SUCCINATE 10 MG/1
10 TABLET, FILM COATED ORAL
Qty: 90 TABLET | Refills: 1 | Status: SHIPPED | OUTPATIENT
Start: 2025-07-07

## 2025-07-10 ENCOUNTER — HOSPITAL ENCOUNTER (OUTPATIENT)
Dept: PREADMISSION TESTING | Facility: HOSPITAL | Age: 60
Discharge: HOME | End: 2025-07-10
Attending: ORTHOPAEDIC SURGERY
Payer: MEDICAID

## 2025-07-10 VITALS — BODY MASS INDEX: 40.48 KG/M2 | HEIGHT: 62 IN | WEIGHT: 220 LBS

## 2025-07-10 DIAGNOSIS — Z01.818 PRE-OP TESTING: ICD-10-CM

## 2025-07-10 DIAGNOSIS — M22.41 CHONDROMALACIA PATELLAE, RIGHT: ICD-10-CM

## 2025-07-10 DIAGNOSIS — M23.41 CHONDRAL LOOSE BODY OF RIGHT KNEE JOINT: ICD-10-CM

## 2025-07-10 DIAGNOSIS — M23.203 DEGENERATIVE TEAR OF MEDIAL MENISCUS, RIGHT: ICD-10-CM

## 2025-07-10 LAB
ABSOLUTE EOSINOPHIL (SMH): 0.19 K/UL
ABSOLUTE MONOCYTE (SMH): 0.45 K/UL (ref 0.3–1)
ABSOLUTE NEUTROPHIL COUNT (SMH): 5 K/UL (ref 1.8–7.7)
ANION GAP (SMH): 5 MMOL/L (ref 8–16)
BASOPHILS # BLD AUTO: 0.04 K/UL
BASOPHILS NFR BLD AUTO: 0.5 %
BUN SERPL-MCNC: 22 MG/DL (ref 6–20)
CALCIUM SERPL-MCNC: 9.2 MG/DL (ref 8.7–10.5)
CHLORIDE SERPL-SCNC: 108 MMOL/L (ref 95–110)
CO2 SERPL-SCNC: 28 MMOL/L (ref 23–29)
CREAT SERPL-MCNC: 0.8 MG/DL (ref 0.5–1.4)
ERYTHROCYTE [DISTWIDTH] IN BLOOD BY AUTOMATED COUNT: 17.9 % (ref 11.5–14.5)
GFR SERPLBLD CREATININE-BSD FMLA CKD-EPI: >60 ML/MIN/1.73/M2
GLUCOSE SERPL-MCNC: 96 MG/DL (ref 70–110)
HCT VFR BLD AUTO: 36.3 % (ref 37–48.5)
HGB BLD-MCNC: 11.1 GM/DL (ref 12–16)
IMM GRANULOCYTES # BLD AUTO: 0.03 K/UL (ref 0–0.04)
IMM GRANULOCYTES NFR BLD AUTO: 0.4 % (ref 0–0.5)
LYMPHOCYTES # BLD AUTO: 2.06 K/UL (ref 1–4.8)
MCH RBC QN AUTO: 20 PG (ref 27–31)
MCHC RBC AUTO-ENTMCNC: 30.6 G/DL (ref 32–36)
MCV RBC AUTO: 66 FL (ref 82–98)
NUCLEATED RBC (/100WBC) (SMH): 0 /100 WBC
OHS QRS DURATION: 88 MS
OHS QTC CALCULATION: 408 MS
PLATELET # BLD AUTO: 285 K/UL (ref 150–450)
PMV BLD AUTO: 10.2 FL (ref 9.2–12.9)
POTASSIUM SERPL-SCNC: 4.9 MMOL/L (ref 3.5–5.1)
RBC # BLD AUTO: 5.54 M/UL (ref 4–5.4)
RELATIVE EOSINOPHIL (SMH): 2.4 % (ref 0–8)
RELATIVE LYMPHOCYTE (SMH): 26.4 % (ref 18–48)
RELATIVE MONOCYTE (SMH): 5.8 % (ref 4–15)
RELATIVE NEUTROPHIL (SMH): 64.5 % (ref 38–73)
SODIUM SERPL-SCNC: 141 MMOL/L (ref 136–145)
WBC # BLD AUTO: 7.81 K/UL (ref 3.9–12.7)

## 2025-07-10 PROCEDURE — 82310 ASSAY OF CALCIUM: CPT | Performed by: ORTHOPAEDIC SURGERY

## 2025-07-10 PROCEDURE — 85025 COMPLETE CBC W/AUTO DIFF WBC: CPT | Performed by: ORTHOPAEDIC SURGERY

## 2025-07-10 PROCEDURE — 36415 COLL VENOUS BLD VENIPUNCTURE: CPT | Performed by: ORTHOPAEDIC SURGERY

## 2025-07-10 NOTE — DISCHARGE INSTRUCTIONS
To confirm, Your doctor has instructed you that surgery is scheduled for: 7/23/25    Pre-Op will call the afternoon prior to surgery between 4:00 and 6:00 PM with the final arrival time.      Please report to Outpatient [place] via [facility] entrance. Check in at registration desk.    DO NOT EAT AFTER MIDNIGHT BEFORE SURGERY. OK TO HAVE CLEAR LIQUIDS UP TO 2 HOURS PRIOR TO ARRIVAL.    CLEAR LIQUIDS INCLUDE: WATER, GATORADE, CLEAR JUICE (NO PULP), BLACK COFFEE AND TEA.    TAKE ONLY THESE MEDICATIONS WITH A SMALL SIP OF WATER THE MORNING OF YOUR PROCEDURE:      ONLY if you are diabetic, check your sugar in the morning before your procedure.       Do not take any diabetic medicines or insulin the morning of surgery .     PLEASE NOTE:  The surgery schedule has many variables which may affect the time of your surgery case.  Family members should be available if your surgery time changes.  Plan to be here the day of your procedure between 4-6 hours.      DO NOT TAKE THESE MEDICATIONS 5-7 DAYS PRIOR to your procedure or per your surgeon's request: ASPIRIN, ALEVE, ADVIL, IBUPROFEN,  DORIS SELTZER, BC , FISH OIL , VITAMIN E, HERBALS  (May take Tylenol)      ONLY if you are prescribed any types of blood thinners such as:  Aspirin, Coumadin, Plavix, Pradaxa, Xarelto, Aggrenox, Effient, Eliquis, Savasya, Brilinta, or any other, ask your surgeon whether you should stop taking them and how long before surgery you should stop.  You may also need to verify with the prescribing physician if it is ok to stop your medication.                                                        IMPORTANT INSTRUCTIONS      Do not smoke, vape or drink alcoholic beverages 24 hours prior to your procedure.  Shower the night before AND the morning of your procedure with a Chlorhexidine wash such as Hibiclens or Dial antibacterial soap from the neck down. Do not apply any deodorants, lotions or powders after each shower.  Do not get it on your  face or in your eyes.  You may use your own shampoo and face wash. This helps your skin to be as bacteria free as possible.  DO NOT remove hair from the surgery site.  Do not shave the incision site unless you are given specific instructions to do so.    Sleep in a bed with clean sheets.  Do not sleep with a pet in the bed.   If you wear contact lenses, dentures, hearing aids or glasses, bring a container to put them in during surgery and give to a family member for safe keeping.    Please leave all jewelry, piercing's and valuables at home.   ONLY if you have been diagnosed with sleep apnea please bring your C-PAP machine.  ONLY if you wear home oxygen please bring your portable oxygen tank the day of your procedure.   ONLY for patients requiring bowel prep, written instructions will be given by your doctor's office.  ONLY if you have a neuro stimulator, please bring the controller with you the morning of surgery.    If your doctor has scheduled you for an overnight stay, bring a small overnight bag with any personal items you need.    Make arrangements in advance for transportation home by a responsible adult.      You must make arrangements for transportation, TAXI'S, UBER'S OR LYFTS ARE NOT ALLOWED.        If you have any questions about these instructions, call Pre-Op Admit  Nursing at [phone] or the Pre-Op Day Surgery Unit at [phone].

## 2025-07-18 DIAGNOSIS — M23.41 CHONDRAL LOOSE BODY OF RIGHT KNEE JOINT: ICD-10-CM

## 2025-07-18 DIAGNOSIS — M22.41 CHONDROMALACIA PATELLAE, RIGHT: ICD-10-CM

## 2025-07-18 DIAGNOSIS — M23.203 DEGENERATIVE TEAR OF MEDIAL MENISCUS, RIGHT: Primary | ICD-10-CM

## 2025-07-18 RX ORDER — OXYCODONE AND ACETAMINOPHEN 7.5; 325 MG/1; MG/1
1 TABLET ORAL EVERY 6 HOURS PRN
Qty: 28 TABLET | Refills: 0 | Status: SHIPPED | OUTPATIENT
Start: 2025-07-18

## 2025-07-23 ENCOUNTER — ANESTHESIA EVENT (OUTPATIENT)
Dept: SURGERY | Facility: HOSPITAL | Age: 60
End: 2025-07-23
Payer: MEDICAID

## 2025-07-23 ENCOUNTER — HOSPITAL ENCOUNTER (OUTPATIENT)
Facility: HOSPITAL | Age: 60
Discharge: HOME | End: 2025-07-23
Attending: ORTHOPAEDIC SURGERY | Admitting: ORTHOPAEDIC SURGERY
Payer: MEDICAID

## 2025-07-23 ENCOUNTER — ANESTHESIA (OUTPATIENT)
Dept: SURGERY | Facility: HOSPITAL | Age: 60
End: 2025-07-23
Payer: MEDICAID

## 2025-07-23 VITALS
HEIGHT: 62 IN | HEART RATE: 65 BPM | SYSTOLIC BLOOD PRESSURE: 145 MMHG | RESPIRATION RATE: 15 BRPM | BODY MASS INDEX: 40.48 KG/M2 | DIASTOLIC BLOOD PRESSURE: 74 MMHG | TEMPERATURE: 98 F | WEIGHT: 220 LBS | OXYGEN SATURATION: 100 %

## 2025-07-23 DIAGNOSIS — Z01.818 PRE-OP TESTING: ICD-10-CM

## 2025-07-23 DIAGNOSIS — M79.605 PAIN IN BOTH LOWER EXTREMITIES: Primary | ICD-10-CM

## 2025-07-23 DIAGNOSIS — M22.41 CHONDROMALACIA PATELLAE, RIGHT: ICD-10-CM

## 2025-07-23 DIAGNOSIS — M79.604 PAIN IN BOTH LOWER EXTREMITIES: Primary | ICD-10-CM

## 2025-07-23 DIAGNOSIS — M23.203 DEGENERATIVE TEAR OF MEDIAL MENISCUS, RIGHT: ICD-10-CM

## 2025-07-23 DIAGNOSIS — M23.41 CHONDRAL LOOSE BODY OF RIGHT KNEE JOINT: ICD-10-CM

## 2025-07-23 PROCEDURE — 25000003 PHARM REV CODE 250

## 2025-07-23 PROCEDURE — 36000711: Performed by: ORTHOPAEDIC SURGERY

## 2025-07-23 PROCEDURE — 25000003 PHARM REV CODE 250: Performed by: ORTHOPAEDIC SURGERY

## 2025-07-23 PROCEDURE — 71000015 HC POSTOP RECOV 1ST HR: Performed by: ORTHOPAEDIC SURGERY

## 2025-07-23 PROCEDURE — 63600175 PHARM REV CODE 636 W HCPCS: Mod: JZ,TB | Performed by: ORTHOPAEDIC SURGERY

## 2025-07-23 PROCEDURE — 27201423 OPTIME MED/SURG SUP & DEVICES STERILE SUPPLY: Performed by: ORTHOPAEDIC SURGERY

## 2025-07-23 PROCEDURE — 71000016 HC POSTOP RECOV ADDL HR: Performed by: ORTHOPAEDIC SURGERY

## 2025-07-23 PROCEDURE — 71000039 HC RECOVERY, EACH ADD'L HOUR: Performed by: ORTHOPAEDIC SURGERY

## 2025-07-23 PROCEDURE — 25000003 PHARM REV CODE 250: Performed by: ANESTHESIOLOGY

## 2025-07-23 PROCEDURE — 63600175 PHARM REV CODE 636 W HCPCS

## 2025-07-23 PROCEDURE — 29881 ARTHRS KNE SRG MNISECTMY M/L: CPT | Mod: RT,,, | Performed by: ORTHOPAEDIC SURGERY

## 2025-07-23 PROCEDURE — 63600175 PHARM REV CODE 636 W HCPCS: Mod: JZ | Performed by: ANESTHESIOLOGY

## 2025-07-23 PROCEDURE — 71000033 HC RECOVERY, INTIAL HOUR: Performed by: ORTHOPAEDIC SURGERY

## 2025-07-23 PROCEDURE — 37000008 HC ANESTHESIA 1ST 15 MINUTES: Performed by: ORTHOPAEDIC SURGERY

## 2025-07-23 PROCEDURE — 37000009 HC ANESTHESIA EA ADD 15 MINS: Performed by: ORTHOPAEDIC SURGERY

## 2025-07-23 PROCEDURE — 36000710: Performed by: ORTHOPAEDIC SURGERY

## 2025-07-23 RX ORDER — PHENYLEPHRINE HYDROCHLORIDE 10 MG/ML
INJECTION INTRAVENOUS
Status: DISCONTINUED | OUTPATIENT
Start: 2025-07-23 | End: 2025-07-23

## 2025-07-23 RX ORDER — SUCCINYLCHOLINE CHLORIDE 20 MG/ML
INJECTION INTRAMUSCULAR; INTRAVENOUS
Status: DISCONTINUED | OUTPATIENT
Start: 2025-07-23 | End: 2025-07-23

## 2025-07-23 RX ORDER — ONDANSETRON HYDROCHLORIDE 2 MG/ML
4 INJECTION, SOLUTION INTRAVENOUS DAILY PRN
Status: DISCONTINUED | OUTPATIENT
Start: 2025-07-23 | End: 2025-07-23 | Stop reason: HOSPADM

## 2025-07-23 RX ORDER — ACETAMINOPHEN 10 MG/ML
INJECTION, SOLUTION INTRAVENOUS
Status: DISCONTINUED | OUTPATIENT
Start: 2025-07-23 | End: 2025-07-23

## 2025-07-23 RX ORDER — OXYCODONE HYDROCHLORIDE 5 MG/1
5 TABLET ORAL
Status: DISCONTINUED | OUTPATIENT
Start: 2025-07-23 | End: 2025-07-23 | Stop reason: HOSPADM

## 2025-07-23 RX ORDER — DEXAMETHASONE SODIUM PHOSPHATE 4 MG/ML
INJECTION, SOLUTION INTRA-ARTICULAR; INTRALESIONAL; INTRAMUSCULAR; INTRAVENOUS; SOFT TISSUE
Status: DISCONTINUED | OUTPATIENT
Start: 2025-07-23 | End: 2025-07-23

## 2025-07-23 RX ORDER — FENTANYL CITRATE 50 UG/ML
INJECTION, SOLUTION INTRAMUSCULAR; INTRAVENOUS
Status: DISCONTINUED | OUTPATIENT
Start: 2025-07-23 | End: 2025-07-23

## 2025-07-23 RX ORDER — HYDROMORPHONE HYDROCHLORIDE 1 MG/ML
0.2 INJECTION, SOLUTION INTRAMUSCULAR; INTRAVENOUS; SUBCUTANEOUS EVERY 5 MIN PRN
Status: DISCONTINUED | OUTPATIENT
Start: 2025-07-23 | End: 2025-07-23 | Stop reason: HOSPADM

## 2025-07-23 RX ORDER — MIDAZOLAM HYDROCHLORIDE 1 MG/ML
INJECTION INTRAMUSCULAR; INTRAVENOUS
Status: DISCONTINUED | OUTPATIENT
Start: 2025-07-23 | End: 2025-07-23

## 2025-07-23 RX ORDER — DIPHENHYDRAMINE HYDROCHLORIDE 50 MG/ML
12.5 INJECTION, SOLUTION INTRAMUSCULAR; INTRAVENOUS
Status: DISCONTINUED | OUTPATIENT
Start: 2025-07-23 | End: 2025-07-23 | Stop reason: HOSPADM

## 2025-07-23 RX ORDER — METHYLPREDNISOLONE ACETATE 80 MG/ML
INJECTION, SUSPENSION INTRA-ARTICULAR; INTRALESIONAL; INTRAMUSCULAR; SOFT TISSUE
Status: DISCONTINUED | OUTPATIENT
Start: 2025-07-23 | End: 2025-07-23 | Stop reason: HOSPADM

## 2025-07-23 RX ORDER — ROCURONIUM BROMIDE 10 MG/ML
INJECTION, SOLUTION INTRAVENOUS
Status: DISCONTINUED | OUTPATIENT
Start: 2025-07-23 | End: 2025-07-23

## 2025-07-23 RX ORDER — BUPIVACAINE HYDROCHLORIDE AND EPINEPHRINE 5; 5 MG/ML; UG/ML
INJECTION, SOLUTION EPIDURAL; INTRACAUDAL; PERINEURAL
Status: DISCONTINUED | OUTPATIENT
Start: 2025-07-23 | End: 2025-07-23 | Stop reason: HOSPADM

## 2025-07-23 RX ORDER — SODIUM CHLORIDE, SODIUM LACTATE, POTASSIUM CHLORIDE, CALCIUM CHLORIDE 600; 310; 30; 20 MG/100ML; MG/100ML; MG/100ML; MG/100ML
INJECTION, SOLUTION INTRAVENOUS CONTINUOUS PRN
Status: DISCONTINUED | OUTPATIENT
Start: 2025-07-23 | End: 2025-07-23

## 2025-07-23 RX ORDER — LIDOCAINE HYDROCHLORIDE 20 MG/ML
INJECTION, SOLUTION EPIDURAL; INFILTRATION; INTRACAUDAL; PERINEURAL
Status: DISCONTINUED | OUTPATIENT
Start: 2025-07-23 | End: 2025-07-23

## 2025-07-23 RX ORDER — PROPOFOL 10 MG/ML
VIAL (ML) INTRAVENOUS
Status: DISCONTINUED | OUTPATIENT
Start: 2025-07-23 | End: 2025-07-23

## 2025-07-23 RX ORDER — GLUCAGON 1 MG
1 KIT INJECTION
Status: DISCONTINUED | OUTPATIENT
Start: 2025-07-23 | End: 2025-07-23 | Stop reason: HOSPADM

## 2025-07-23 RX ORDER — FAMOTIDINE 10 MG/ML
INJECTION, SOLUTION INTRAVENOUS
Status: DISCONTINUED | OUTPATIENT
Start: 2025-07-23 | End: 2025-07-23

## 2025-07-23 RX ORDER — MUPIROCIN 20 MG/G
OINTMENT TOPICAL 2 TIMES DAILY
Status: CANCELLED | OUTPATIENT
Start: 2025-07-23 | End: 2025-07-25

## 2025-07-23 RX ORDER — ONDANSETRON HYDROCHLORIDE 2 MG/ML
INJECTION, SOLUTION INTRAMUSCULAR; INTRAVENOUS
Status: DISCONTINUED | OUTPATIENT
Start: 2025-07-23 | End: 2025-07-23

## 2025-07-23 RX ORDER — HYDROCODONE BITARTRATE AND ACETAMINOPHEN 5; 325 MG/1; MG/1
1 TABLET ORAL EVERY 4 HOURS PRN
Refills: 0 | Status: CANCELLED | OUTPATIENT
Start: 2025-07-23

## 2025-07-23 RX ADMIN — HYDROMORPHONE HYDROCHLORIDE 0.2 MG: 1 INJECTION, SOLUTION INTRAMUSCULAR; INTRAVENOUS; SUBCUTANEOUS at 12:07

## 2025-07-23 RX ADMIN — FENTANYL CITRATE 50 MCG: 50 INJECTION, SOLUTION INTRAMUSCULAR; INTRAVENOUS at 11:07

## 2025-07-23 RX ADMIN — PHENYLEPHRINE HYDROCHLORIDE 100 MCG: 10 INJECTION INTRAVENOUS at 11:07

## 2025-07-23 RX ADMIN — Medication 120 MG: at 10:07

## 2025-07-23 RX ADMIN — OXYCODONE HYDROCHLORIDE 5 MG: 5 TABLET ORAL at 12:07

## 2025-07-23 RX ADMIN — ACETAMINOPHEN 1000 MG: 10 INJECTION, SOLUTION INTRAVENOUS at 11:07

## 2025-07-23 RX ADMIN — FENTANYL CITRATE 50 MCG: 50 INJECTION, SOLUTION INTRAMUSCULAR; INTRAVENOUS at 10:07

## 2025-07-23 RX ADMIN — ONDANSETRON 4 MG: 2 INJECTION INTRAMUSCULAR; INTRAVENOUS at 11:07

## 2025-07-23 RX ADMIN — PROPOFOL 200 MG: 10 INJECTION, EMULSION INTRAVENOUS at 10:07

## 2025-07-23 RX ADMIN — DEXAMETHASONE SODIUM PHOSPHATE 8 MG: 4 INJECTION, SOLUTION INTRAMUSCULAR; INTRAVENOUS at 11:07

## 2025-07-23 RX ADMIN — SODIUM CHLORIDE, SODIUM LACTATE, POTASSIUM CHLORIDE, AND CALCIUM CHLORIDE: .6; .31; .03; .02 INJECTION, SOLUTION INTRAVENOUS at 10:07

## 2025-07-23 RX ADMIN — MIDAZOLAM HYDROCHLORIDE 2 MG: 1 INJECTION, SOLUTION INTRAMUSCULAR; INTRAVENOUS at 10:07

## 2025-07-23 RX ADMIN — SUGAMMADEX 200 MG: 100 INJECTION, SOLUTION INTRAVENOUS at 11:07

## 2025-07-23 RX ADMIN — ROCURONIUM BROMIDE 20 MG: 10 INJECTION, SOLUTION INTRAVENOUS at 11:07

## 2025-07-23 RX ADMIN — DEXTROSE 2 G: 50 INJECTION, SOLUTION INTRAVENOUS at 11:07

## 2025-07-23 RX ADMIN — FAMOTIDINE 20 MG: 10 INJECTION, SOLUTION INTRAVENOUS at 11:07

## 2025-07-23 RX ADMIN — LIDOCAINE HYDROCHLORIDE 80 MG: 20 INJECTION, SOLUTION INTRAVENOUS at 10:07

## 2025-07-23 NOTE — ANESTHESIA PROCEDURE NOTES
Intubation    Date/Time: 7/23/2025 10:58 AM    Performed by: Miguelina Farris CRNA  Authorized by: Carlos Olguin Jr., MD    Intubation:     Induction:  Intravenous    Intubated:  Postinduction    Mask Ventilation:  Easy mask    Attempts:  1    Attempted By:  CRNA    Method of Intubation:  Video laryngoscopy    Blade:  Alfaro 3    Laryngeal View Grade: Grade I - full view of cords      Difficult Airway Encountered?: No      Complications:  None    Airway Device:  Oral endotracheal tube    Airway Device Size:  7.5    Style/Cuff Inflation:  Cuffed    Tube secured:  22    Secured at:  The lips    Placement Verified By:  Capnometry and Revisualization with laryngoscopy    Complicating Factors:  Small mouth    Findings Post-Intubation:  BS equal bilateral and atraumatic/condition of teeth unchanged  Notes:      Unsuccessful attempt at placing LMA 4. LMA 3 placed by Dr. Olguin, but unable to ventilate. Intubated with 7.5 ETT with no difficulty.

## 2025-07-23 NOTE — PLAN OF CARE
1332- pt is alert and oriented breathing even unlabored with 5/10 pain to right knee. Dressing is clean and dry with no redness or swelling noted. Good right pedal pulse felt. Pt is sitting up drinking fluids. 1435- pt tolerated po intake with no n/v. Pt voided with no difficulty. Surgical dressing is clean and dry with a good right pedal pulse. Detailed discharge instructions given to the pt and her .

## 2025-07-23 NOTE — ANESTHESIA PREPROCEDURE EVALUATION
07/23/2025  Zoila Moreland is a 59 y.o., female.    Problem List[1]    Past Surgical History:   Procedure Laterality Date    BILATERAL SALPINGOOPHORECTOMY Bilateral 5/18/2007    Torsion of ovaries and tubes    BREAST BIOPSY Right     (-)    CARDIAC CATHETERIZATION      cardiac stents  01/2017    1 stent    HYSTERECTOMY  1/13/2006    uterus and cervix due to AUB    OOPHORECTOMY      TRIGGER FINGER RELEASE Right 9/8/2020    Procedure: RELEASE, TRIGGER FINGER R-thumb;  Surgeon: Sam Bangura Jr., MD;  Location: [facility];  Service: Orthopedics;  Laterality: Right;        Tobacco Use:  The patient  reports that she has never smoked. She has never used smokeless tobacco.     Results for orders placed or performed during the hospital encounter of 07/10/25   EKG 12-lead    Collection Time: 07/10/25 11:27 AM   Result Value Ref Range    QRS Duration 88 ms    OHS QTC Calculation 408 ms    Narrative    Test Reason : Z01.818,    Vent. Rate :  58 BPM     Atrial Rate :  58 BPM     P-R Int : 152 ms          QRS Dur :  88 ms      QT Int : 416 ms       P-R-T Axes :  24   4  11 degrees    QTcB Int : 408 ms    Sinus bradycardia  Possible Anterior infarct ,age undetermined  Abnormal ECG  No previous ECGs available  Confirmed by Mark Centeno ([id number]) on 7/13/2025 2:33:22 PM    Referred By:            Confirmed By: Mark Centeno             Lab Results   Component Value Date    WBC 7.81 07/10/2025    HGB 11.1 (L) 07/10/2025    HCT 36.3 (L) 07/10/2025    MCV 66 (L) 07/10/2025     07/10/2025     BMP  Lab Results   Component Value Date     07/10/2025    K 4.9 07/10/2025     07/10/2025    CO2 28 07/10/2025    BUN 22 (H) 07/10/2025    CREATININE 0.8 07/10/2025    CALCIUM 9.2 07/10/2025    ANIONGAP 5 (L) 07/10/2025    GLU 96 07/10/2025     05/07/2025    GLU 94 11/04/2024       Results for orders placed  during the hospital encounter of 06/18/21    Echo Color Flow Doppler? Yes; Bubble Contrast? No    Interpretation Summary  · The left ventricle is normal in size with normal systolic function.  · The estimated ejection fraction is 55-60%.  · No left ventricular wall motion abnormailites are observed.  · Normal left ventricular diastolic function.  · Normal right ventricular size with normal right ventricular systolic function.  · The estimated PA systolic pressure is 27 mmHg.  · There is no pulmonary hypertension.  · Normal central venous pressure (3 mmHg).  · No significant valvular abnormalities are observed.            Pre-op Assessment    I have reviewed the Patient Summary Reports.     I have reviewed the Nursing Notes. I have reviewed the NPO Status.   I have reviewed the Medications.     Review of Systems  Anesthesia Hx:             Denies Family Hx of Anesthesia complications.    Denies Personal Hx of Anesthesia complications.                    Social:  Non-Smoker, No Alcohol Use       Hematology/Oncology:    Oncology Normal    -- Anemia:               Hematology Comments: Beta thalassemia minor                    EENT/Dental:  EENT/Dental Normal           Cardiovascular:     Hypertension, well controlled   CAD   CABG/stent (coronary stents 2016)    Denies Angina. (hx of angina, pt denies any recent CP or anginal equivalents)         Followed by cardiologist no problems with recent office visit                           Pulmonary:    Asthma                    Renal/:  Renal/ Normal                 Musculoskeletal:  Musculoskeletal Normal                Neurological:  Neurology Normal                                      Endocrine:  Endocrine Normal          Morbid Obesity / BMI > 40  Psych:  Psychiatric Normal                    Physical Exam  General: Well nourished, Cooperative, Alert and Oriented    Airway:  Mallampati: III   Mouth Opening: Normal  TM Distance: > 6 cm  Tongue: Normal  Neck ROM: Normal  ROM    Dental:  Intact    Chest/Lungs:  Clear to auscultation, Normal Respiratory Rate    Heart:  Rate: Normal  Rhythm: Regular Rhythm  Sounds: Normal        Anesthesia Plan  Type of Anesthesia, risks & benefits discussed:    Anesthesia Type: Gen Supraglottic Airway  Intra-op Monitoring Plan: Standard ASA Monitors  Post Op Pain Control Plan: multimodal analgesia  Induction:  IV  Airway Plan: , Post-Induction  Informed Consent: Informed consent signed with the Patient and all parties understand the risks and agree with anesthesia plan.  All questions answered.   ASA Score: 3  Anesthesia Plan Notes:   General LMA  IV tylenol, low dose intra-op ketamine ok  Zofran Decadron Pepcid       Ready For Surgery From Anesthesia Perspective.     .           [1]   Patient Active Problem List  Diagnosis    Stable angina    Other hyperlipidemia    Family history of cardiovascular disease    Dyspnea on exertion    S/P coronary artery stent placement    Fever blister    Vaccine counseling    Pneumococcal vaccine refused    Influenza vaccine refused    Essential hypertension    Mild persistent asthma without complication    Fatigue    Coronary artery disease of native artery of native heart with stable angina pectoris    Anemia    Trigger finger of right thumb    Pain in both lower extremities    H/O hyperglycemia    Beta thalassemia minor    Microcytic red blood cells    Class 3 severe obesity due to excess calories without serious comorbidity with body mass index (BMI) of 40.0 to 44.9 in adult    Asthma in adult without complication    Multiple risk factors for coronary artery disease    History of echocardiogram    Decreased range of motion of lumbar spine    Abdominal weakness    Impaired mobility    Decreased range of motion (ROM) of right knee    Decreased strength of lower extremity

## 2025-07-23 NOTE — TRANSFER OF CARE
"Anesthesia Transfer of Care Note    Patient: Zoila Moreland    Procedure(s) Performed: Procedure(s) (LRB):  ARTHROSCOPY, KNEE, W/ MENISCECTOMY, RIGHT WITH REMOVAL LOOSE BODY (Right)    Patient location: PACU    Anesthesia Type: general    Transport from OR: Transported from OR on room air with adequate spontaneous ventilation    Post pain: adequate analgesia    Post assessment: no apparent anesthetic complications and tolerated procedure well    Post vital signs: stable    Level of consciousness: awake and alert    Nausea/Vomiting: no nausea/vomiting    Complications: none    Transfer of care protocol was followed      Last vitals: Visit Vitals  /72   Pulse 62   Temp 36.9 °C (98.5 °F) (Oral)   Resp 16   Ht 5' 2" (1.575 m)   Wt 99.8 kg (220 lb)   LMP  (LMP Unknown)   SpO2 95%   Breastfeeding No   BMI 40.24 kg/m²     "

## 2025-07-23 NOTE — ANESTHESIA POSTPROCEDURE EVALUATION
Anesthesia Post Evaluation    Patient: Zoila Moreland    Procedure(s) Performed: Procedure(s) (LRB):  ARTHROSCOPY, KNEE, W/ MENISCECTOMY (Right)  ARTHROSCOPY, KNEE, W/ REMOVAL, LOOSE BODY    Final Anesthesia Type: general      Patient location during evaluation: PACU  Patient participation: Yes- Able to Participate  Level of consciousness: awake and alert and oriented  Post-procedure vital signs: reviewed and stable  Pain management: adequate  Airway patency: patent    PONV status at discharge: No PONV  Anesthetic complications: no      Cardiovascular status: blood pressure returned to baseline, hemodynamically stable and stable  Respiratory status: unassisted, spontaneous ventilation and room air  Hydration status: euvolemic  Follow-up not needed.              Vitals Value Taken Time   /72 07/23/25 13:15   Temp 36.4 °C (97.6 °F) 07/23/25 12:45   Pulse 58 07/23/25 13:26   Resp 18 07/23/25 13:26   SpO2 96 % 07/23/25 13:26   Vitals shown include unfiled device data.      Event Time   Out of Recovery 13:28:49         Pain/Oscar Score: Pain Rating Prior to Med Admin: 8 (7/23/2025 12:35 PM)  Oscar Score: 10 (7/23/2025  1:15 PM)

## 2025-07-23 NOTE — OP NOTE
[facility]  Surgery Department  Operative Note    SUMMARY     Date of Procedure: 7/23/2025     Procedure:  Partial medial meniscectomy right knee                       Loose body removal right knee    Surgeons and Role:     * Albert Ham MD - Primary    Assisting Surgeon:  Rowena    Pre-Operative Diagnosis: Degenerative tear of medial meniscus, right [M23.203]  Chondromalacia patellae, right [M22.41]  Chondral loose body of right knee joint [M23.41]  Pre-op testing [Z01.818]    Post-Operative Diagnosis: Post-Op Diagnosis Codes:     * Degenerative tear of medial meniscus, right [M23.203]     * Chondromalacia patellae, right [M22.41]     * Chondral loose body of right knee joint [M23.41]     * Pre-op testing [Z01.818]    Anesthesia: General    Intraoperative Findings:  The patellofemoral joint was noted to have grade 3 chondromalacia throughout.  The ACL and PCL were intact.  The lateral compartment was intact in terms of its cartilage and meniscus.  The medial compartment demonstrated a 2 cm x 2 cm loose body which was adhered to the fat pad in the anterior aspect of the medial compartment.  There was grade 4 chondromalacia throughout the medial femoral condyle and medial tibial plateau.  There was an unstable posterior horn medial meniscal tear consisting of 60% of the posterior horn of the medial meniscus    Description of the Findings of the Procedure:  Patient was placed on the operative table in supine position.  Inferomedial and inferolateral portals were established and diagnostic arthroscopy was undertaken.  The findings of those stated above.  At this point I turned my attention to the loose body in the anterior aspect of the knee.  I used a biter and morselized the loose body and then removed with a shaver.  I now used a shaver and a biter and resected 60% of the posterior horn of the medial meniscus and balanced into the body.  At this point I used a shaver and gently debrided loose  fragmented cartilage on the medial femoral condyle and on the patella.  I removed the arthroscopic equipment and the portals were closed with nylon stitches.  This patient may benefit from a total knee arthroplasty in the future    Complications: No    Estimated Blood Loss (EBL): * No values recorded between 7/23/2025 10:38 AM and 7/23/2025 11:43 AM *           Implants: * No implants in log *    Specimens:   Specimen (24h ago, onward)      None                    Condition: Good    Disposition: PACU - hemodynamically stable.              Discharge Note    SUMMARY     Admit Date: 7/23/2025    Discharge Date and Time:  07/23/2025 11:43 AM    Hospital Course (synopsis of major diagnoses, care, treatment, and services provided during the course of the hospital stay): Uneventful      Final Diagnosis: Post-Op Diagnosis Codes:     * Degenerative tear of medial meniscus, right [M23.203]     * Chondromalacia patellae, right [M22.41]     * Chondral loose body of right knee joint [M23.41]     * Pre-op testing [Z01.818]    Disposition: Home or Self Care    Follow Up/Patient Instructions:     Medications:  Reconciled Home Medications:   Current Discharge Medication List        CONTINUE these medications which have NOT CHANGED    Details   albuterol (PROVENTIL/VENTOLIN HFA) 90 mcg/actuation inhaler Inhale 2 puffs into the lungs every 6 (six) hours as needed for Wheezing.  Qty: 18 g, Refills: 2    Associated Diagnoses: Mild intermittent asthma with acute exacerbation      ascorbic acid, vitamin C, (VITAMIN C) 1000 MG tablet Take 1,000 mg by mouth once daily.      atorvastatin (LIPITOR) 80 MG tablet Take 1 tablet (80 mg total) by mouth every evening.  Qty: 90 tablet, Refills: 3    Associated Diagnoses: Coronary artery disease of native artery of native heart with stable angina pectoris      calcium-vitamin D (OSCAL) 250 (625)-125 mg-unit per tablet Take 1 tablet by mouth once daily.      carvediloL (COREG) 25 MG tablet Take 1  tablet (25 mg total) by mouth 2 (two) times daily with meals.  Qty: 180 tablet, Refills: 3    Comments: .      fluticasone propionate (FLONASE) 50 mcg/actuation nasal spray 1 spray (50 mcg total) by Each Nostril route 2 (two) times daily.  Qty: 16 g, Refills: 2    Associated Diagnoses: Post-nasal drainage      fluticasone-salmeterol diskus inhaler 100-50 mcg INHALE 1 DOSE BY MOUTH INTO LUNGS TWICE DAILY .  CONTROLLER  Qty: 60 each, Refills: 5    Associated Diagnoses: Mild intermittent asthma with acute exacerbation      lisinopriL (PRINIVIL,ZESTRIL) 40 MG tablet Take 1 tablet (40 mg total) by mouth every evening.  Qty: 90 tablet, Refills: 3    Comments: .  Associated Diagnoses: Essential hypertension      meloxicam (MOBIC) 15 MG tablet Take 1 tablet (15 mg total) by mouth once daily.  Qty: 30 tablet, Refills: 5    Associated Diagnoses: Arthritis      pantoprazole (PROTONIX) 40 MG tablet Take 1 tablet by mouth once daily  Qty: 90 tablet, Refills: 3    Associated Diagnoses: Gastroesophageal reflux disease without esophagitis      solifenacin (VESICARE) 10 MG tablet TAKE 1 TABLET BY MOUTH ONCE DAILY . APPOINTMENT REQUIRED FOR FUTURE REFILLS  Qty: 90 tablet, Refills: 1      spironolactone (ALDACTONE) 25 MG tablet Take 1 tablet (25 mg total) by mouth once daily.  Qty: 90 tablet, Refills: 3    Comments: .      valACYclovir (VALTREX) 1000 MG tablet 2 tablets PO at onset of fever blister. Repeat 12 hours later x1 dose. Repeat for repeat occurrence.  Qty: 30 tablet, Refills: 5    Associated Diagnoses: Fever blister      alirocumab (PRALUENT PEN) 150 mg/mL PnIj Inject 2 mLs (300 mg total) into the skin every 30 days.  Qty: 2 mL, Refills: 11    Associated Diagnoses: Other hyperlipidemia      aspirin 81 MG Chew Take 1 tablet (81 mg total) by mouth once daily.  Qty: 1 tablet, Refills: 0      LIDOcaine viscous HCl 2% (LIDOCAINE VISCOUS) 2 % Soln by Mucous Membrane route every 3 (three) hours. Gargle 5 ml in the throat every 3  hours as needed for sore throat.  Qty: 100 mL, Refills: 0    Associated Diagnoses: Sore throat      oxyCODONE-acetaminophen (PERCOCET) 7.5-325 mg per tablet Take 1 tablet by mouth every 6 (six) hours as needed for Pain.  Qty: 28 tablet, Refills: 0    Comments: This prescription is for postoperative use for the patient's scheduled surgery on Wednesday July 23, 2025.  This is for the meds to bed program.  Associated Diagnoses: Degenerative tear of medial meniscus, right; Chondromalacia patellae, right; Chondral loose body of right knee joint           Discharge Procedure Orders   Diet general     Activity as tolerated     Sponge bath only until clinic visit     Ice to affected area     Weight bearing restrictions (specify)     Remove dressing in 24 hours     Call MD for:  temperature >100.4     Call MD for:  persistent nausea and vomiting     Call MD for:  severe uncontrolled pain     Call MD for:  difficulty breathing, headache or visual disturbances     Call MD for:  redness, tenderness, or signs of infection (pain, swelling, redness, odor or green/yellow discharge around incision site)     Call MD for:  hives     Call MD for:  persistent dizziness or light-headedness     Call MD for:  extreme fatigue     Shower on day dressing removed (No bath)

## 2025-07-24 ENCOUNTER — OFFICE VISIT (OUTPATIENT)
Dept: ORTHOPEDICS | Facility: CLINIC | Age: 60
End: 2025-07-24
Payer: MEDICAID

## 2025-07-24 VITALS — BODY MASS INDEX: 40.48 KG/M2 | WEIGHT: 220 LBS | HEIGHT: 62 IN

## 2025-07-24 DIAGNOSIS — Z98.890 STATUS POST ARTHROSCOPY OF RIGHT KNEE: Primary | ICD-10-CM

## 2025-07-24 PROCEDURE — 99213 OFFICE O/P EST LOW 20 MIN: CPT | Mod: PBBFAC,PN | Performed by: ORTHOPAEDIC SURGERY

## 2025-07-24 PROCEDURE — 99999 PR PBB SHADOW E&M-EST. PATIENT-LVL III: CPT | Mod: PBBFAC,,, | Performed by: ORTHOPAEDIC SURGERY

## 2025-07-24 NOTE — PROGRESS NOTES
[facility] ORTHOPEDICS POST-OP NOTE    Subjective:           Chief Complaint:   Chief Complaint   Patient presents with    Right Knee - Post-op Evaluation     POD1 R KNEE SCOPE 7/23/25. Slept well last night, good relief with pain medication.        Past Medical History:   Diagnosis Date    Allergy     Anemia 3/30/2020    Asthma     Fever blister     Hypertension        Past Surgical History:   Procedure Laterality Date    BILATERAL SALPINGOOPHORECTOMY Bilateral 05/18/2007    Torsion of ovaries and tubes    BREAST BIOPSY Right     (-)    CARDIAC CATHETERIZATION      cardiac stents  01/2017    1 stent    HYSTERECTOMY  01/13/2006    uterus and cervix due to AUB    KNEE ARTHROSCOPY Right 07/23/2025    OOPHORECTOMY      TRIGGER FINGER RELEASE Right 09/08/2020    Procedure: RELEASE, TRIGGER FINGER R-thumb;  Surgeon: Sam Bangura Jr., MD;  Location: [facility];  Service: Orthopedics;  Laterality: Right;       Current Outpatient Medications   Medication Sig    oxyCODONE-acetaminophen (PERCOCET) 7.5-325 mg per tablet Take 1 tablet by mouth every 6 (six) hours as needed for Pain.    albuterol (PROVENTIL/VENTOLIN HFA) 90 mcg/actuation inhaler Inhale 2 puffs into the lungs every 6 (six) hours as needed for Wheezing.    alirocumab (PRALUENT PEN) 150 mg/mL PnIj Inject 2 mLs (300 mg total) into the skin every 30 days.    ascorbic acid, vitamin C, (VITAMIN C) 1000 MG tablet Take 1,000 mg by mouth once daily.    aspirin 81 MG Chew Take 1 tablet (81 mg total) by mouth once daily. (Patient not taking: Reported on 7/24/2025)    atorvastatin (LIPITOR) 80 MG tablet Take 1 tablet (80 mg total) by mouth every evening.    calcium-vitamin D (OSCAL) 250 (625)-125 mg-unit per tablet Take 1 tablet by mouth once daily.    carvediloL (COREG) 25 MG tablet Take 1 tablet (25 mg total) by mouth 2 (two) times daily with meals.    fluticasone propionate (FLONASE) 50 mcg/actuation nasal spray 1 spray (50 mcg total) by Each Nostril route 2 (two) times  daily.    fluticasone-salmeterol diskus inhaler 100-50 mcg INHALE 1 DOSE BY MOUTH INTO LUNGS TWICE DAILY .  CONTROLLER    LIDOcaine viscous HCl 2% (LIDOCAINE VISCOUS) 2 % Soln by Mucous Membrane route every 3 (three) hours. Gargle 5 ml in the throat every 3 hours as needed for sore throat. (Patient not taking: Reported on 5/13/2025)    lisinopriL (PRINIVIL,ZESTRIL) 40 MG tablet Take 1 tablet (40 mg total) by mouth every evening.    meloxicam (MOBIC) 15 MG tablet Take 1 tablet (15 mg total) by mouth once daily.    pantoprazole (PROTONIX) 40 MG tablet Take 1 tablet by mouth once daily    solifenacin (VESICARE) 10 MG tablet TAKE 1 TABLET BY MOUTH ONCE DAILY . APPOINTMENT REQUIRED FOR FUTURE REFILLS    spironolactone (ALDACTONE) 25 MG tablet Take 1 tablet (25 mg total) by mouth once daily.    valACYclovir (VALTREX) 1000 MG tablet 2 tablets PO at onset of fever blister. Repeat 12 hours later x1 dose. Repeat for repeat occurrence.     No current facility-administered medications for this visit.       Review of patient's allergies indicates:  No Known Allergies    Family History   Problem Relation Name Age of Onset    Heart disease Mother      Diabetes Mother      Heart disease Father         Social History[1]    History of present illness: Zoila comes in today postop day 1 right knee arthroscopy. Comes in today for wound check and dressing change and pain control assessment.  Her pain is controlled.  She is doing well.  She was found to have grade 4 changes throughout the medial compartment.    Review of Systems:    Musculoskeletal:  See HPI      Objective:        Physical Examination:    Vital Signs:  There were no vitals filed for this visit.    Body mass index is 40.24 kg/m².    This a well-developed, well nourished patient in no acute distress.  They are alert and oriented and cooperative to examination.          Right knee exam: Skin to right knee clean dry and intact.  No erythema or ecchymosis.  No signs or  "symptoms of infection.  Neurovascularly intact throughout the right lower extremity.  Negative Homans on the right.  She can weightbear as tolerated right lower extremity but has the expected slow des with antalgic gait.Two arthroscopic portals healing well without wound dehiscence or drainage.    Pertinent New Results:    XRAY Report / Interpretation:     No new radiographs taken on today's clinic visit.    Assessment/Plan:        1. Status post right knee arthroscopy.      Dressing change today.  She tolerated this well.  Advised to clean the operative site once a day with warm soapy water.  Do not apply any topical creams or ointments.  Do not submerge underwater in a pool or bathtub type environment will after suture removal.  We will see her back in about 10 days for wound check and to remove her sutures.      Mark No, Physician Assistant, served in the capacity as a "scribe" for this patient encounter.  A "face-to-face" encounter occurred with Dr. Albert Ham on this date.  The treatment plan and medical decision-making is outlined above. Patient was seen and examined with a chaperone.     This note was created using Dragon voice recognition software that occasionally misinterpreted phrases or words.             [1]   Social History  Socioeconomic History    Marital status:      Spouse name: Danny    Number of children: 2    Years of education: 12th   Occupational History    Occupation: homemaker   Tobacco Use    Smoking status: Never    Smokeless tobacco: Never   Substance and Sexual Activity    Alcohol use: No    Drug use: No    Sexual activity: Yes     Partners: Male     Birth control/protection: Surgical   Other Topics Concern    Are you pregnant or think you may be? No    Breast-feeding No     Social Drivers of Health     Financial Resource Strain: Low Risk  (3/27/2025)    Overall Financial Resource Strain (CARDIA)     Difficulty of Paying Living Expenses: Not very hard   Food " Insecurity: No Food Insecurity (3/27/2025)    Hunger Vital Sign     Worried About Running Out of Food in the Last Year: Never true     Ran Out of Food in the Last Year: Never true   Transportation Needs: No Transportation Needs (3/27/2025)    PRAPARE - Transportation     Lack of Transportation (Medical): No     Lack of Transportation (Non-Medical): No   Physical Activity: Insufficiently Active (3/27/2025)    Exercise Vital Sign     Days of Exercise per Week: 2 days     Minutes of Exercise per Session: 10 min   Stress: No Stress Concern Present (3/27/2025)    [employer] - Occupational Stress Questionnaire     Feeling of Stress : Not at all   Housing Stability: Low Risk  (3/27/2025)    Housing Stability Vital Sign     Unable to Pay for Housing in the Last Year: No     Number of Times Moved in the Last Year: 0     Homeless in the Last Year: No

## 2025-08-01 ENCOUNTER — CLINICAL SUPPORT (OUTPATIENT)
Dept: ORTHOPEDICS | Facility: CLINIC | Age: 60
End: 2025-08-01
Payer: MEDICAID

## 2025-08-01 VITALS — WEIGHT: 220 LBS | HEIGHT: 62 IN | BODY MASS INDEX: 40.48 KG/M2

## 2025-08-01 DIAGNOSIS — Z98.890 STATUS POST ARTHROSCOPY OF RIGHT KNEE: Primary | ICD-10-CM

## 2025-08-01 PROCEDURE — 99999 PR PBB SHADOW E&M-EST. PATIENT-LVL III: CPT | Mod: PBBFAC,,,

## 2025-08-01 PROCEDURE — 99213 OFFICE O/P EST LOW 20 MIN: CPT | Mod: PBBFAC,PN

## 2025-08-01 NOTE — PROGRESS NOTES
[facility] ORTHOPEDICS POST-OP NOTE    Subjective:           Chief Complaint: No chief complaint on file.      Past Medical History:   Diagnosis Date    Allergy     Anemia 3/30/2020    Asthma     Fever blister     Hypertension        Past Surgical History:   Procedure Laterality Date    ARTHROSCOPY, KNEE, WITH LOOSE BODY REMOVAL  7/23/2025    Procedure: ARTHROSCOPY, KNEE, W/ REMOVAL, LOOSE BODY;  Surgeon: Albert Ham MD;  Location: [facility];  Service: Orthopedics;;    BILATERAL SALPINGOOPHORECTOMY Bilateral 05/18/2007    Torsion of ovaries and tubes    BREAST BIOPSY Right     (-)    CARDIAC CATHETERIZATION      cardiac stents  01/2017    1 stent    HYSTERECTOMY  01/13/2006    uterus and cervix due to AUB    KNEE ARTHROSCOPY Right 07/23/2025    KNEE ARTHROSCOPY W/ MENISCECTOMY Right 7/23/2025    Procedure: ARTHROSCOPY, KNEE, W/ MENISCECTOMY;  Surgeon: Albert Ham MD;  Location: [facility] OR;  Service: Orthopedics;  Laterality: Right;    OOPHORECTOMY      TRIGGER FINGER RELEASE Right 09/08/2020    Procedure: RELEASE, TRIGGER FINGER R-thumb;  Surgeon: Sam Bangura Jr., MD;  Location: [facility];  Service: Orthopedics;  Laterality: Right;       Current Outpatient Medications   Medication Sig    albuterol (PROVENTIL/VENTOLIN HFA) 90 mcg/actuation inhaler Inhale 2 puffs into the lungs every 6 (six) hours as needed for Wheezing.    alirocumab (PRALUENT PEN) 150 mg/mL PnIj Inject 2 mLs (300 mg total) into the skin every 30 days.    ascorbic acid, vitamin C, (VITAMIN C) 1000 MG tablet Take 1,000 mg by mouth once daily.    aspirin 81 MG Chew Take 1 tablet (81 mg total) by mouth once daily. (Patient not taking: Reported on 7/24/2025)    atorvastatin (LIPITOR) 80 MG tablet Take 1 tablet (80 mg total) by mouth every evening.    calcium-vitamin D (OSCAL) 250 (625)-125 mg-unit per tablet Take 1 tablet by mouth once daily.    carvediloL (COREG) 25 MG tablet Take 1 tablet (25 mg total) by mouth 2 (two) times daily with meals.     fluticasone propionate (FLONASE) 50 mcg/actuation nasal spray 1 spray (50 mcg total) by Each Nostril route 2 (two) times daily.    fluticasone-salmeterol diskus inhaler 100-50 mcg INHALE 1 DOSE BY MOUTH INTO LUNGS TWICE DAILY .  CONTROLLER    LIDOcaine viscous HCl 2% (LIDOCAINE VISCOUS) 2 % Soln by Mucous Membrane route every 3 (three) hours. Gargle 5 ml in the throat every 3 hours as needed for sore throat. (Patient not taking: Reported on 5/13/2025)    lisinopriL (PRINIVIL,ZESTRIL) 40 MG tablet Take 1 tablet (40 mg total) by mouth every evening.    meloxicam (MOBIC) 15 MG tablet Take 1 tablet (15 mg total) by mouth once daily.    oxyCODONE-acetaminophen (PERCOCET) 7.5-325 mg per tablet Take 1 tablet by mouth every 6 (six) hours as needed for Pain.    pantoprazole (PROTONIX) 40 MG tablet Take 1 tablet by mouth once daily    solifenacin (VESICARE) 10 MG tablet TAKE 1 TABLET BY MOUTH ONCE DAILY . APPOINTMENT REQUIRED FOR FUTURE REFILLS    spironolactone (ALDACTONE) 25 MG tablet Take 1 tablet (25 mg total) by mouth once daily.    valACYclovir (VALTREX) 1000 MG tablet 2 tablets PO at onset of fever blister. Repeat 12 hours later x1 dose. Repeat for repeat occurrence.     No current facility-administered medications for this visit.       Review of patient's allergies indicates:  No Known Allergies    Family History   Problem Relation Name Age of Onset    Heart disease Mother      Diabetes Mother      Heart disease Father         Social History[1]    History of present illness:  Patient comes in today for follow-up for her right knee arthroscopy.  She had a partial medial meniscectomy and loose body removal.  She is found to have grade 3 changes in the patellofemoral joint and grade 4 changes on the medial femoral condyle.    Review of Systems:    Musculoskeletal:  See HPI      Objective:        Physical Examination:    Vital Signs:  There were no vitals filed for this visit.    There is no height or weight on file to  calculate BMI.    This a well-developed, well nourished patient in no acute distress.  They are alert and oriented and cooperative to examination.          Right knee exam: Skin to right knee clean dry and intact.  No erythema or ecchymosis.  No signs or symptoms of infection.  She is neurovascularly intact throughout the right lower extremity.  Negative Homans on the right.  Two arthroscopic portals healing well without wound dehiscence or drainage.  She can weightbear as tolerated right lower extremity.  Mildly antalgic gait.  She ambulates without an aid.   She can fully extend the knee to 0° and easily flex to about 110 or so.    Pertinent New Results:    XRAY Report / Interpretation:   No new radiographs taken on today's clinic visit.    Assessment/Plan:        1. Status post right knee arthroscopy.      Sutures removed today.  She tolerated this well.  Advised to clean the operative site once a day with warm soapy water.  Do not apply any topical creams or ointments.  Do not submerge underwater in a pool or bathtub type environment for least 48 hours.  She can resume/ continue her regular activities of daily living as pain tolerates.  We will check her back in 1 month to assess her postoperative progress.    CLINTON Rasheed, PA-C    This note was created using Dragon voice recognition software that occasionally misinterpreted phrases or words.             [1]   Social History  Socioeconomic History    Marital status:      Spouse name: Danny    Number of children: 2    Years of education: 12th   Occupational History    Occupation: homemaker   Tobacco Use    Smoking status: Never    Smokeless tobacco: Never   Substance and Sexual Activity    Alcohol use: No    Drug use: No    Sexual activity: Yes     Partners: Male     Birth control/protection: Surgical   Other Topics Concern    Are you pregnant or think you may be? No    Breast-feeding No     Social Drivers of Health     Financial Resource  Strain: Low Risk  (3/27/2025)    Overall Financial Resource Strain (CARDIA)     Difficulty of Paying Living Expenses: Not very hard   Food Insecurity: No Food Insecurity (3/27/2025)    Hunger Vital Sign     Worried About Running Out of Food in the Last Year: Never true     Ran Out of Food in the Last Year: Never true   Transportation Needs: No Transportation Needs (3/27/2025)    PRAPARE - Transportation     Lack of Transportation (Medical): No     Lack of Transportation (Non-Medical): No   Physical Activity: Insufficiently Active (3/27/2025)    Exercise Vital Sign     Days of Exercise per Week: 2 days     Minutes of Exercise per Session: 10 min   Stress: No Stress Concern Present (3/27/2025)    [employer] [place] [employer] - Occupational Stress Questionnaire     Feeling of Stress : Not at all   Housing Stability: Low Risk  (3/27/2025)    Housing Stability Vital Sign     Unable to Pay for Housing in the Last Year: No     Number of Times Moved in the Last Year: 0     Homeless in the Last Year: No

## 2025-08-18 DIAGNOSIS — M19.90 ARTHRITIS: ICD-10-CM

## 2025-08-20 RX ORDER — MELOXICAM 15 MG/1
15 TABLET ORAL
Qty: 30 TABLET | Refills: 0 | Status: SHIPPED | OUTPATIENT
Start: 2025-08-20

## 2025-09-02 ENCOUNTER — OFFICE VISIT (OUTPATIENT)
Dept: ORTHOPEDICS | Facility: CLINIC | Age: 60
End: 2025-09-02
Payer: MEDICAID

## 2025-09-02 VITALS — WEIGHT: 220 LBS | BODY MASS INDEX: 40.48 KG/M2 | HEIGHT: 62 IN

## 2025-09-02 DIAGNOSIS — Z98.890 STATUS POST ARTHROSCOPY OF RIGHT KNEE: Primary | ICD-10-CM

## 2025-09-02 PROCEDURE — 1159F MED LIST DOCD IN RCRD: CPT | Mod: CPTII,,, | Performed by: ORTHOPAEDIC SURGERY

## 2025-09-02 PROCEDURE — 4010F ACE/ARB THERAPY RXD/TAKEN: CPT | Mod: CPTII,,, | Performed by: ORTHOPAEDIC SURGERY

## 2025-09-02 PROCEDURE — 99999 PR PBB SHADOW E&M-EST. PATIENT-LVL III: CPT | Mod: PBBFAC,,, | Performed by: ORTHOPAEDIC SURGERY

## 2025-09-02 PROCEDURE — 99024 POSTOP FOLLOW-UP VISIT: CPT | Mod: ,,, | Performed by: ORTHOPAEDIC SURGERY

## 2025-09-02 PROCEDURE — 99213 OFFICE O/P EST LOW 20 MIN: CPT | Mod: PBBFAC,PN | Performed by: ORTHOPAEDIC SURGERY

## 2025-09-02 PROCEDURE — 1160F RVW MEDS BY RX/DR IN RCRD: CPT | Mod: CPTII,,, | Performed by: ORTHOPAEDIC SURGERY

## (undated) DEVICE — SPONGE SUPER KERLIX 6X6.75IN

## (undated) DEVICE — CONTAINER SPECIMEN STRL 4OZ

## (undated) DEVICE — SCRUB HIBICLENS 4% CHG 4OZ

## (undated) DEVICE — CUTTER MENISCUS AGGRESSIVE 4.0

## (undated) DEVICE — BLADE SURG #15 CARBON STEEL

## (undated) DEVICE — SUT ETHILON 4-0 PS2 18 BLK

## (undated) DEVICE — SEE MEDLINE ITEM 157131

## (undated) DEVICE — COVER CAMERA OPERATING ROOM

## (undated) DEVICE — GLOVE SURG ULTRA TOUCH 8

## (undated) DEVICE — NDL SAFETY 25G X 1.5 ECLIPSE

## (undated) DEVICE — BRUSH SCRUB HIBICLENS 4%

## (undated) DEVICE — PAD CAST SPECIALIST STRL 4

## (undated) DEVICE — SYR 10CC LUER LOCK

## (undated) DEVICE — SPONGE COTTON TRAY 4X4IN

## (undated) DEVICE — SEE MEDLINE ITEM 146308

## (undated) DEVICE — BANDAGE ESMARK LATEX FREE 4INX

## (undated) DEVICE — PACK ARTHROSCOPY SMH

## (undated) DEVICE — GLOVE SENSICARE PI SURG 8.5

## (undated) DEVICE — TRAY DRY SPONGE SCRUB W FOAM

## (undated) DEVICE — TOURNIQUET SB QC DP 34X4IN

## (undated) DEVICE — SEE MEDLINE ITEM 152487

## (undated) DEVICE — SOL NACL IRR 3000ML

## (undated) DEVICE — ELECTRODE REM PLYHSV RETURN 9

## (undated) DEVICE — GLOVE SENSICARE PI GRN 8.5

## (undated) DEVICE — ALCOHOL 70% ISOP RUBBING 4OZ

## (undated) DEVICE — COVER SURG LIGHT HANDLE

## (undated) DEVICE — COVER LIGHT HANDLE 80/CA

## (undated) DEVICE — GLOVE SENSICARE PI GRN 9

## (undated) DEVICE — PROBE RF ARTHSCP 3.5MM

## (undated) DEVICE — PENCIL ROCKER SWITCH 10FT CORD

## (undated) DEVICE — NDL BOX COUNTER

## (undated) DEVICE — SEE MEDLINE ITEM 157117

## (undated) DEVICE — PADDING CAST 4IN SPECIALIST

## (undated) DEVICE — STRAP OR TABLE 5IN X 72IN

## (undated) DEVICE — SEE MEDLINE ITEM 152622

## (undated) DEVICE — SOL NACL IRR 1000ML BTL

## (undated) DEVICE — SOL 9P NACL IRR PIC IL

## (undated) DEVICE — SEE MEDLINE ITEM 157171

## (undated) DEVICE — PACK BASIC

## (undated) DEVICE — SEE MEDLINE ITEM 157116

## (undated) DEVICE — SPONGE GAUZE 16PLY 4X4

## (undated) DEVICE — SKINMARKER W/RULER DEVON

## (undated) DEVICE — SET IRR CYSTO TUR Y-TYPE 90IN

## (undated) DEVICE — SYS LABEL CORRECT MED

## (undated) DEVICE — DRESSING N ADH OIL EMUL 3X3